# Patient Record
Sex: MALE | Race: BLACK OR AFRICAN AMERICAN | NOT HISPANIC OR LATINO
[De-identification: names, ages, dates, MRNs, and addresses within clinical notes are randomized per-mention and may not be internally consistent; named-entity substitution may affect disease eponyms.]

---

## 2017-01-12 ENCOUNTER — RX RENEWAL (OUTPATIENT)
Age: 82
End: 2017-01-12

## 2017-01-19 ENCOUNTER — APPOINTMENT (OUTPATIENT)
Dept: CARDIOLOGY | Facility: CLINIC | Age: 82
End: 2017-01-19

## 2017-01-19 ENCOUNTER — NON-APPOINTMENT (OUTPATIENT)
Age: 82
End: 2017-01-19

## 2017-01-19 VITALS
HEART RATE: 105 BPM | SYSTOLIC BLOOD PRESSURE: 114 MMHG | RESPIRATION RATE: 18 BRPM | BODY MASS INDEX: 24.11 KG/M2 | OXYGEN SATURATION: 96 % | WEIGHT: 150 LBS | TEMPERATURE: 98.3 F | DIASTOLIC BLOOD PRESSURE: 67 MMHG | HEIGHT: 66 IN

## 2017-01-26 ENCOUNTER — NON-APPOINTMENT (OUTPATIENT)
Age: 82
End: 2017-01-26

## 2017-01-26 ENCOUNTER — APPOINTMENT (OUTPATIENT)
Dept: CARDIOLOGY | Facility: CLINIC | Age: 82
End: 2017-01-26
Payer: MEDICARE

## 2017-01-26 VITALS
HEART RATE: 78 BPM | HEIGHT: 66 IN | RESPIRATION RATE: 18 BRPM | SYSTOLIC BLOOD PRESSURE: 125 MMHG | BODY MASS INDEX: 24.11 KG/M2 | WEIGHT: 150 LBS | DIASTOLIC BLOOD PRESSURE: 72 MMHG | TEMPERATURE: 98.1 F | OXYGEN SATURATION: 96 %

## 2017-01-26 PROCEDURE — 99215 OFFICE O/P EST HI 40 MIN: CPT

## 2017-01-26 PROCEDURE — 93000 ELECTROCARDIOGRAM COMPLETE: CPT

## 2017-01-27 LAB — DIGOXIN SERPL-MCNC: 0.4 NG/ML

## 2017-03-02 ENCOUNTER — NON-APPOINTMENT (OUTPATIENT)
Age: 82
End: 2017-03-02

## 2017-03-02 ENCOUNTER — APPOINTMENT (OUTPATIENT)
Dept: CARDIOLOGY | Facility: CLINIC | Age: 82
End: 2017-03-02

## 2017-03-02 VITALS
SYSTOLIC BLOOD PRESSURE: 130 MMHG | RESPIRATION RATE: 18 BRPM | DIASTOLIC BLOOD PRESSURE: 69 MMHG | BODY MASS INDEX: 24.11 KG/M2 | OXYGEN SATURATION: 95 % | WEIGHT: 150 LBS | TEMPERATURE: 97.9 F | HEIGHT: 66 IN | HEART RATE: 59 BPM

## 2017-03-08 ENCOUNTER — FORM ENCOUNTER (OUTPATIENT)
Age: 82
End: 2017-03-08

## 2017-03-09 ENCOUNTER — OUTPATIENT (OUTPATIENT)
Dept: OUTPATIENT SERVICES | Facility: HOSPITAL | Age: 82
LOS: 1 days | End: 2017-03-09
Payer: COMMERCIAL

## 2017-03-09 ENCOUNTER — APPOINTMENT (OUTPATIENT)
Dept: CT IMAGING | Facility: IMAGING CENTER | Age: 82
End: 2017-03-09

## 2017-03-09 ENCOUNTER — APPOINTMENT (OUTPATIENT)
Dept: ULTRASOUND IMAGING | Facility: IMAGING CENTER | Age: 82
End: 2017-03-09

## 2017-03-09 ENCOUNTER — APPOINTMENT (OUTPATIENT)
Dept: CARDIOLOGY | Facility: CLINIC | Age: 82
End: 2017-03-09

## 2017-03-09 VITALS
WEIGHT: 146 LBS | OXYGEN SATURATION: 96 % | TEMPERATURE: 98.1 F | HEART RATE: 60 BPM | RESPIRATION RATE: 18 BRPM | DIASTOLIC BLOOD PRESSURE: 70 MMHG | BODY MASS INDEX: 23.46 KG/M2 | HEIGHT: 66 IN | SYSTOLIC BLOOD PRESSURE: 111 MMHG

## 2017-03-09 DIAGNOSIS — R60.0 LOCALIZED EDEMA: ICD-10-CM

## 2017-03-09 DIAGNOSIS — R93.8 ABNORMAL FINDINGS ON DIAGNOSTIC IMAGING OF OTHER SPECIFIED BODY STRUCTURES: ICD-10-CM

## 2017-03-09 DIAGNOSIS — Z98.89 OTHER SPECIFIED POSTPROCEDURAL STATES: Chronic | ICD-10-CM

## 2017-03-09 DIAGNOSIS — Z95.1 PRESENCE OF AORTOCORONARY BYPASS GRAFT: Chronic | ICD-10-CM

## 2017-03-09 PROCEDURE — 93970 EXTREMITY STUDY: CPT

## 2017-03-09 PROCEDURE — 71250 CT THORAX DX C-: CPT

## 2017-03-10 LAB
ANION GAP SERPL CALC-SCNC: 15 MMOL/L
BUN SERPL-MCNC: 16 MG/DL
CALCIUM SERPL-MCNC: 9.9 MG/DL
CHLORIDE SERPL-SCNC: 99 MMOL/L
CO2 SERPL-SCNC: 27 MMOL/L
CREAT SERPL-MCNC: 1.13 MG/DL
GLUCOSE SERPL-MCNC: 112 MG/DL
POTASSIUM SERPL-SCNC: 5.3 MMOL/L
SODIUM SERPL-SCNC: 141 MMOL/L

## 2017-03-15 DIAGNOSIS — J98.11 ATELECTASIS: ICD-10-CM

## 2017-03-15 DIAGNOSIS — M79.89 OTHER SPECIFIED SOFT TISSUE DISORDERS: ICD-10-CM

## 2017-03-16 ENCOUNTER — APPOINTMENT (OUTPATIENT)
Dept: ELECTROPHYSIOLOGY | Facility: CLINIC | Age: 82
End: 2017-03-16

## 2017-03-16 ENCOUNTER — APPOINTMENT (OUTPATIENT)
Dept: CARDIOLOGY | Facility: CLINIC | Age: 82
End: 2017-03-16

## 2017-03-16 VITALS
BODY MASS INDEX: 23.78 KG/M2 | TEMPERATURE: 98.3 F | SYSTOLIC BLOOD PRESSURE: 145 MMHG | WEIGHT: 148 LBS | HEIGHT: 66 IN | HEART RATE: 63 BPM | OXYGEN SATURATION: 96 % | RESPIRATION RATE: 18 BRPM | DIASTOLIC BLOOD PRESSURE: 77 MMHG

## 2017-03-16 VITALS — DIASTOLIC BLOOD PRESSURE: 70 MMHG | SYSTOLIC BLOOD PRESSURE: 138 MMHG

## 2017-03-16 LAB
BASOPHILS # BLD AUTO: 0.02 K/UL
BASOPHILS NFR BLD AUTO: 0.3 %
EOSINOPHIL # BLD AUTO: 0.29 K/UL
EOSINOPHIL NFR BLD AUTO: 4.6 %
HCT VFR BLD CALC: 42.5 %
HGB BLD-MCNC: 13.8 G/DL
IMM GRANULOCYTES NFR BLD AUTO: 0 %
LYMPHOCYTES # BLD AUTO: 1.08 K/UL
LYMPHOCYTES NFR BLD AUTO: 17.3 %
MAN DIFF?: NORMAL
MCHC RBC-ENTMCNC: 31.3 PG
MCHC RBC-ENTMCNC: 32.5 GM/DL
MCV RBC AUTO: 96.4 FL
MONOCYTES # BLD AUTO: 0.83 K/UL
MONOCYTES NFR BLD AUTO: 13.3 %
NEUTROPHILS # BLD AUTO: 4.02 K/UL
NEUTROPHILS NFR BLD AUTO: 64.5 %
PLATELET # BLD AUTO: 144 K/UL
RBC # BLD: 4.41 M/UL
RBC # FLD: 13.2 %
WBC # FLD AUTO: 6.24 K/UL

## 2017-03-20 ENCOUNTER — RX RENEWAL (OUTPATIENT)
Age: 82
End: 2017-03-20

## 2017-03-22 ENCOUNTER — APPOINTMENT (OUTPATIENT)
Dept: CV DIAGNOSITCS | Facility: HOSPITAL | Age: 82
End: 2017-03-22

## 2017-03-22 ENCOUNTER — OUTPATIENT (OUTPATIENT)
Dept: OUTPATIENT SERVICES | Facility: HOSPITAL | Age: 82
LOS: 1 days | End: 2017-03-22

## 2017-03-22 DIAGNOSIS — I71.4 ABDOMINAL AORTIC ANEURYSM, WITHOUT RUPTURE: ICD-10-CM

## 2017-03-22 DIAGNOSIS — Z95.1 PRESENCE OF AORTOCORONARY BYPASS GRAFT: Chronic | ICD-10-CM

## 2017-03-22 DIAGNOSIS — Z98.89 OTHER SPECIFIED POSTPROCEDURAL STATES: Chronic | ICD-10-CM

## 2017-04-27 ENCOUNTER — NON-APPOINTMENT (OUTPATIENT)
Age: 82
End: 2017-04-27

## 2017-04-27 ENCOUNTER — APPOINTMENT (OUTPATIENT)
Dept: CARDIOLOGY | Facility: CLINIC | Age: 82
End: 2017-04-27

## 2017-04-27 VITALS
HEIGHT: 66 IN | WEIGHT: 150 LBS | DIASTOLIC BLOOD PRESSURE: 57 MMHG | TEMPERATURE: 97.9 F | HEART RATE: 75 BPM | SYSTOLIC BLOOD PRESSURE: 109 MMHG | BODY MASS INDEX: 24.11 KG/M2 | OXYGEN SATURATION: 95 % | RESPIRATION RATE: 18 BRPM

## 2017-04-27 RX ORDER — WARFARIN 1 MG/1
1 TABLET ORAL
Qty: 30 | Refills: 0 | Status: DISCONTINUED | COMMUNITY
Start: 2016-12-01

## 2017-04-27 RX ORDER — DIGOXIN 125 UG/1
125 TABLET ORAL
Qty: 15 | Refills: 2 | Status: DISCONTINUED | COMMUNITY
Start: 2017-01-19 | End: 2017-04-27

## 2017-04-27 RX ORDER — WARFARIN 3 MG/1
3 TABLET ORAL
Qty: 30 | Refills: 0 | Status: DISCONTINUED | COMMUNITY
Start: 2016-12-01

## 2017-04-27 RX ORDER — ACETAMINOPHEN AND CODEINE 300; 30 MG/1; MG/1
300-30 TABLET ORAL
Qty: 20 | Refills: 0 | Status: DISCONTINUED | COMMUNITY
Start: 2016-10-19

## 2017-04-27 RX ORDER — AMOXICILLIN 875 MG/1
875 TABLET, FILM COATED ORAL
Qty: 14 | Refills: 0 | Status: DISCONTINUED | COMMUNITY
Start: 2016-10-01

## 2017-06-12 ENCOUNTER — RX RENEWAL (OUTPATIENT)
Age: 82
End: 2017-06-12

## 2017-06-21 ENCOUNTER — APPOINTMENT (OUTPATIENT)
Dept: ELECTROPHYSIOLOGY | Facility: CLINIC | Age: 82
End: 2017-06-21

## 2017-06-21 VITALS — DIASTOLIC BLOOD PRESSURE: 64 MMHG | SYSTOLIC BLOOD PRESSURE: 128 MMHG

## 2017-07-21 ENCOUNTER — APPOINTMENT (OUTPATIENT)
Dept: ELECTROPHYSIOLOGY | Facility: CLINIC | Age: 82
End: 2017-07-21

## 2017-07-21 VITALS
BODY MASS INDEX: 22.98 KG/M2 | SYSTOLIC BLOOD PRESSURE: 176 MMHG | HEART RATE: 59 BPM | HEIGHT: 66 IN | DIASTOLIC BLOOD PRESSURE: 95 MMHG | OXYGEN SATURATION: 99 % | WEIGHT: 143 LBS

## 2017-07-24 ENCOUNTER — RX RENEWAL (OUTPATIENT)
Age: 82
End: 2017-07-24

## 2017-07-27 ENCOUNTER — NON-APPOINTMENT (OUTPATIENT)
Age: 82
End: 2017-07-27

## 2017-07-27 ENCOUNTER — APPOINTMENT (OUTPATIENT)
Dept: CARDIOLOGY | Facility: CLINIC | Age: 82
End: 2017-07-27
Payer: MEDICARE

## 2017-07-27 VITALS
BODY MASS INDEX: 23.95 KG/M2 | RESPIRATION RATE: 18 BRPM | HEART RATE: 62 BPM | OXYGEN SATURATION: 97 % | WEIGHT: 149 LBS | DIASTOLIC BLOOD PRESSURE: 76 MMHG | HEIGHT: 66 IN | SYSTOLIC BLOOD PRESSURE: 126 MMHG | TEMPERATURE: 98.3 F

## 2017-07-27 PROCEDURE — 99215 OFFICE O/P EST HI 40 MIN: CPT

## 2017-07-27 PROCEDURE — 93000 ELECTROCARDIOGRAM COMPLETE: CPT

## 2017-08-31 ENCOUNTER — OUTPATIENT (OUTPATIENT)
Dept: OUTPATIENT SERVICES | Facility: HOSPITAL | Age: 82
LOS: 1 days | End: 2017-08-31
Payer: MEDICARE

## 2017-08-31 ENCOUNTER — APPOINTMENT (OUTPATIENT)
Dept: CV DIAGNOSITCS | Facility: HOSPITAL | Age: 82
End: 2017-08-31

## 2017-08-31 DIAGNOSIS — R60.0 LOCALIZED EDEMA: ICD-10-CM

## 2017-08-31 DIAGNOSIS — Z95.1 PRESENCE OF AORTOCORONARY BYPASS GRAFT: Chronic | ICD-10-CM

## 2017-08-31 DIAGNOSIS — Z98.89 OTHER SPECIFIED POSTPROCEDURAL STATES: Chronic | ICD-10-CM

## 2017-08-31 PROCEDURE — 93970 EXTREMITY STUDY: CPT | Mod: 26

## 2017-09-01 ENCOUNTER — APPOINTMENT (OUTPATIENT)
Dept: CARDIOLOGY | Facility: CLINIC | Age: 82
End: 2017-09-01

## 2017-09-28 ENCOUNTER — APPOINTMENT (OUTPATIENT)
Dept: ELECTROPHYSIOLOGY | Facility: CLINIC | Age: 82
End: 2017-09-28
Payer: MEDICARE

## 2017-09-28 PROCEDURE — 93296 REM INTERROG EVL PM/IDS: CPT

## 2017-09-28 PROCEDURE — 93294 REM INTERROG EVL PM/LDLS PM: CPT

## 2017-10-26 ENCOUNTER — APPOINTMENT (OUTPATIENT)
Dept: CARDIOLOGY | Facility: CLINIC | Age: 82
End: 2017-10-26
Payer: MEDICARE

## 2017-10-26 ENCOUNTER — NON-APPOINTMENT (OUTPATIENT)
Age: 82
End: 2017-10-26

## 2017-10-26 VITALS
DIASTOLIC BLOOD PRESSURE: 75 MMHG | HEART RATE: 97 BPM | OXYGEN SATURATION: 97 % | SYSTOLIC BLOOD PRESSURE: 125 MMHG | WEIGHT: 147 LBS | TEMPERATURE: 97.9 F | HEIGHT: 66 IN | RESPIRATION RATE: 18 BRPM | BODY MASS INDEX: 23.63 KG/M2

## 2017-10-26 PROCEDURE — 99215 OFFICE O/P EST HI 40 MIN: CPT

## 2017-10-26 PROCEDURE — 93000 ELECTROCARDIOGRAM COMPLETE: CPT

## 2018-01-03 ENCOUNTER — APPOINTMENT (OUTPATIENT)
Dept: ELECTROPHYSIOLOGY | Facility: CLINIC | Age: 83
End: 2018-01-03
Payer: MEDICARE

## 2018-01-03 VITALS — HEART RATE: 90 BPM | SYSTOLIC BLOOD PRESSURE: 149 MMHG | DIASTOLIC BLOOD PRESSURE: 90 MMHG

## 2018-01-03 PROCEDURE — 93280 PM DEVICE PROGR EVAL DUAL: CPT

## 2018-01-23 ENCOUNTER — MEDICATION RENEWAL (OUTPATIENT)
Age: 83
End: 2018-01-23

## 2018-01-25 ENCOUNTER — APPOINTMENT (OUTPATIENT)
Dept: UROLOGY | Facility: CLINIC | Age: 83
End: 2018-01-25
Payer: MEDICARE

## 2018-01-25 ENCOUNTER — APPOINTMENT (OUTPATIENT)
Dept: CARDIOLOGY | Facility: CLINIC | Age: 83
End: 2018-01-25
Payer: MEDICARE

## 2018-01-25 ENCOUNTER — NON-APPOINTMENT (OUTPATIENT)
Age: 83
End: 2018-01-25

## 2018-01-25 VITALS
OXYGEN SATURATION: 96 % | HEIGHT: 66 IN | WEIGHT: 143 LBS | SYSTOLIC BLOOD PRESSURE: 107 MMHG | RESPIRATION RATE: 17 BRPM | BODY MASS INDEX: 22.98 KG/M2 | HEART RATE: 77 BPM | TEMPERATURE: 98.1 F | DIASTOLIC BLOOD PRESSURE: 66 MMHG

## 2018-01-25 DIAGNOSIS — N40.0 BENIGN PROSTATIC HYPERPLASIA WITHOUT LOWER URINARY TRACT SYMPMS: ICD-10-CM

## 2018-01-25 PROCEDURE — 99215 OFFICE O/P EST HI 40 MIN: CPT

## 2018-01-25 PROCEDURE — 93000 ELECTROCARDIOGRAM COMPLETE: CPT

## 2018-01-25 PROCEDURE — 99203 OFFICE O/P NEW LOW 30 MIN: CPT

## 2018-02-08 ENCOUNTER — APPOINTMENT (OUTPATIENT)
Dept: CV DIAGNOSITCS | Facility: HOSPITAL | Age: 83
End: 2018-02-08
Payer: MEDICARE

## 2018-02-08 ENCOUNTER — OUTPATIENT (OUTPATIENT)
Dept: OUTPATIENT SERVICES | Facility: HOSPITAL | Age: 83
LOS: 1 days | End: 2018-02-08

## 2018-02-08 DIAGNOSIS — Z95.1 PRESENCE OF AORTOCORONARY BYPASS GRAFT: Chronic | ICD-10-CM

## 2018-02-08 DIAGNOSIS — I25.10 ATHEROSCLEROTIC HEART DISEASE OF NATIVE CORONARY ARTERY WITHOUT ANGINA PECTORIS: ICD-10-CM

## 2018-02-08 DIAGNOSIS — I10 ESSENTIAL (PRIMARY) HYPERTENSION: ICD-10-CM

## 2018-02-08 DIAGNOSIS — Z98.89 OTHER SPECIFIED POSTPROCEDURAL STATES: Chronic | ICD-10-CM

## 2018-02-08 PROCEDURE — 93306 TTE W/DOPPLER COMPLETE: CPT | Mod: 26

## 2018-02-23 ENCOUNTER — OUTPATIENT (OUTPATIENT)
Dept: OUTPATIENT SERVICES | Facility: HOSPITAL | Age: 83
LOS: 1 days | End: 2018-02-23
Payer: MEDICARE

## 2018-02-23 VITALS
HEIGHT: 65 IN | WEIGHT: 149.91 LBS | OXYGEN SATURATION: 95 % | DIASTOLIC BLOOD PRESSURE: 70 MMHG | TEMPERATURE: 98 F | SYSTOLIC BLOOD PRESSURE: 138 MMHG | RESPIRATION RATE: 16 BRPM | HEART RATE: 79 BPM

## 2018-02-23 DIAGNOSIS — Z95.1 PRESENCE OF AORTOCORONARY BYPASS GRAFT: Chronic | ICD-10-CM

## 2018-02-23 DIAGNOSIS — N47.1 PHIMOSIS: ICD-10-CM

## 2018-02-23 DIAGNOSIS — Z98.89 OTHER SPECIFIED POSTPROCEDURAL STATES: Chronic | ICD-10-CM

## 2018-02-23 DIAGNOSIS — Z95.0 PRESENCE OF CARDIAC PACEMAKER: Chronic | ICD-10-CM

## 2018-02-23 DIAGNOSIS — I10 ESSENTIAL (PRIMARY) HYPERTENSION: ICD-10-CM

## 2018-02-23 DIAGNOSIS — I48.91 UNSPECIFIED ATRIAL FIBRILLATION: ICD-10-CM

## 2018-02-23 LAB
APTT BLD: 50.4 SEC — HIGH (ref 27.5–37.4)
BUN SERPL-MCNC: 22 MG/DL — SIGNIFICANT CHANGE UP (ref 7–23)
CALCIUM SERPL-MCNC: 9 MG/DL — SIGNIFICANT CHANGE UP (ref 8.4–10.5)
CHLORIDE SERPL-SCNC: 102 MMOL/L — SIGNIFICANT CHANGE UP (ref 98–107)
CO2 SERPL-SCNC: 29 MMOL/L — SIGNIFICANT CHANGE UP (ref 22–31)
CREAT SERPL-MCNC: 1.1 MG/DL — SIGNIFICANT CHANGE UP (ref 0.5–1.3)
GLUCOSE SERPL-MCNC: 109 MG/DL — HIGH (ref 70–99)
HBA1C BLD-MCNC: 6.3 % — HIGH (ref 4–5.6)
HCT VFR BLD CALC: 38.2 % — LOW (ref 39–50)
HGB BLD-MCNC: 12.3 G/DL — LOW (ref 13–17)
INR BLD: 2.41 — HIGH (ref 0.88–1.17)
MCHC RBC-ENTMCNC: 30.8 PG — SIGNIFICANT CHANGE UP (ref 27–34)
MCHC RBC-ENTMCNC: 32.2 % — SIGNIFICANT CHANGE UP (ref 32–36)
MCV RBC AUTO: 95.7 FL — SIGNIFICANT CHANGE UP (ref 80–100)
NRBC # FLD: 0 — SIGNIFICANT CHANGE UP
PLATELET # BLD AUTO: 117 K/UL — LOW (ref 150–400)
PMV BLD: 12.1 FL — SIGNIFICANT CHANGE UP (ref 7–13)
POTASSIUM SERPL-MCNC: 4.4 MMOL/L — SIGNIFICANT CHANGE UP (ref 3.5–5.3)
POTASSIUM SERPL-SCNC: 4.4 MMOL/L — SIGNIFICANT CHANGE UP (ref 3.5–5.3)
PROTHROM AB SERPL-ACNC: 27.2 SEC — HIGH (ref 9.8–13.1)
RBC # BLD: 3.99 M/UL — LOW (ref 4.2–5.8)
RBC # FLD: 13.5 % — SIGNIFICANT CHANGE UP (ref 10.3–14.5)
SODIUM SERPL-SCNC: 140 MMOL/L — SIGNIFICANT CHANGE UP (ref 135–145)
WBC # BLD: 6.57 K/UL — SIGNIFICANT CHANGE UP (ref 3.8–10.5)
WBC # FLD AUTO: 6.57 K/UL — SIGNIFICANT CHANGE UP (ref 3.8–10.5)

## 2018-02-23 PROCEDURE — 93010 ELECTROCARDIOGRAM REPORT: CPT

## 2018-02-23 NOTE — H&P PST ADULT - PROBLEM SELECTOR PLAN 1
Pt is scheduled for circumcision on 3/6/18.     Pre op instructions given and pt able to verbalize understanding.

## 2018-02-23 NOTE — H&P PST ADULT - PROBLEM SELECTOR PLAN 3
Pt instructed to take all morning antihypertensive/antiarrhythmic medications as prescribed(pt does not know which medications he takes in the morning, usually is given by daughter).  Pt met STOP BANG score for SELWYN precaution. OR booking notified via fax.

## 2018-02-23 NOTE — H&P PST ADULT - NEGATIVE MUSCULOSKELETAL SYMPTOMS
no muscle cramps/no muscle weakness/no neck pain/no leg pain R/no back pain/no leg pain L/no arthralgia/no stiffness/no joint swelling/no myalgia/no arm pain L/no arm pain R

## 2018-02-23 NOTE — H&P PST ADULT - NEGATIVE NEUROLOGICAL SYMPTOMS
no loss of sensation/no hemiparesis/no syncope/no vertigo/no loss of consciousness/no headache/no confusion/no transient paralysis/no weakness/no generalized seizures/no difficulty walking

## 2018-02-23 NOTE — H&P PST ADULT - PROBLEM SELECTOR PLAN 2
Pt instructed to continue coumadin unless told otherwise.  Urology note in chart, staying pt may stay on coumadin.

## 2018-02-23 NOTE — H&P PST ADULT - PSH
Artificial pacemaker  [employer] Model #[id number] Serial #[id number]  Breast cyst  bilateral around 50 years ago  S/P CABG (coronary artery bypass graft)  2013  S/P heart valve repair  mitral valve repair with annuloplasty ring 2013  S/P small bowel resection  due to perforated intestine  during colonoscopy around 2000  Stented coronary artery  1996 x1 Artificial pacemaker  [employer] Model #[id number] Serial #[id number]  Breast cyst  bilateral around 50 years ago  S/P CABG (coronary artery bypass graft)  x2 2013  S/P heart valve repair  mitral valve repair with annuloplasty ring 2013  S/P small bowel resection  due to perforated intestine  during colonoscopy around 2000  Stented coronary artery  1996 x1

## 2018-02-23 NOTE — H&P PST ADULT - LYMPHATIC
posterior cervical L/posterior cervical R/supraclavicular R/anterior cervical R/supraclavicular L/anterior cervical L

## 2018-02-23 NOTE — H&P PST ADULT - RS GEN PE MLT RESP DETAILS PC
no rales/breath sounds equal/no wheezes/clear to auscultation bilaterally/airway patent/no intercostal retractions/no chest wall tenderness/good air movement/respirations non-labored/no rhonchi

## 2018-02-23 NOTE — H&P PST ADULT - NSANTHOSAYNRD_GEN_A_CORE
No. SELWYN screening performed.  STOP BANG Legend: 0-2 = LOW Risk; 3-4 = INTERMEDIATE Risk; 5-8 = HIGH Risk

## 2018-02-23 NOTE — H&P PST ADULT - HISTORY OF PRESENT ILLNESS
89 yo male with PM atrial fibrillation on coumadin, CAD x1 stent 1996, hypertension, hyperlipidemia, and diet controlled DM2 presents to pre surgical testing.  Pt report he started to experiencing phimosis for a few years but is progressively worsening.  Pt is scheduled for circumcision on 3/6/18.

## 2018-02-23 NOTE — H&P PST ADULT - PMH
Atrial fibrillation    BPH (benign prostatic hyperplasia)    BPH (Benign Prostatic Hyperplasia)    CAD (coronary artery disease)  x1 stent 1996  S/P CABGx2 2005  CAD (coronary artery disease)    Cataract    GERD (gastroesophageal reflux disease)    Glaucoma    HLD (hyperlipidemia)    HTN (hypertension)    HTN (hypertension)    Seizure  age 9, had 1 seizure, s/p fall  Valvular heart disease  S/P valve repair Atrial fibrillation    BPH (benign prostatic hyperplasia)    BPH (Benign Prostatic Hyperplasia)    CAD (coronary artery disease)  x1 stent 1996  S/P CABGx2 2005  CAD (coronary artery disease)    Cataract    GERD (gastroesophageal reflux disease)    Glaucoma    HLD (hyperlipidemia)    HTN (hypertension)    HTN (hypertension)    Phimosis    Seizure  age 9, had 1 seizure, s/p fall  Valvular heart disease  S/P valve repair

## 2018-02-23 NOTE — H&P PST ADULT - NEGATIVE OPHTHALMOLOGIC SYMPTOMS
no blurred vision L/no pain R/no diplopia/no photophobia/no lacrimation L/no lacrimation R/no pain L/no blurred vision R/no loss of vision L/no loss of vision R

## 2018-02-23 NOTE — H&P PST ADULT - NEGATIVE GASTROINTESTINAL SYMPTOMS
no change in bowel habits/no hematochezia/no constipation/no melena/no abdominal pain/no vomiting/no diarrhea/no nausea

## 2018-02-23 NOTE — H&P PST ADULT - NEGATIVE ENMT SYMPTOMS
no nasal congestion/no dry mouth/no dysphagia/no tinnitus/no sinus symptoms/no nasal discharge/no throat pain/no ear pain/no vertigo

## 2018-02-23 NOTE — H&P PST ADULT - NEGATIVE CARDIOVASCULAR SYMPTOMS
no peripheral edema/no orthopnea/no chest pain/no paroxysmal nocturnal dyspnea/no palpitations/no claudication/no dyspnea on exertion

## 2018-02-23 NOTE — H&P PST ADULT - CARDIOVASCULAR DETAILS
irregular rate and rhythm/positive S2/positive S1 murmur/irregular rate and rhythm/positive S2/positive S1

## 2018-02-23 NOTE — H&P PST ADULT - MUSCULOSKELETAL
detailed exam no calf tenderness/no joint swelling/no joint warmth/ROM intact/no joint erythema/normal strength details…

## 2018-03-06 ENCOUNTER — APPOINTMENT (OUTPATIENT)
Dept: UROLOGY | Facility: AMBULATORY SURGERY CENTER | Age: 83
End: 2018-03-06

## 2018-03-06 ENCOUNTER — RESULT REVIEW (OUTPATIENT)
Age: 83
End: 2018-03-06

## 2018-03-06 ENCOUNTER — TRANSCRIPTION ENCOUNTER (OUTPATIENT)
Age: 83
End: 2018-03-06

## 2018-03-06 ENCOUNTER — OUTPATIENT (OUTPATIENT)
Dept: OUTPATIENT SERVICES | Facility: HOSPITAL | Age: 83
LOS: 1 days | Discharge: HOME | End: 2018-03-06
Payer: MEDICARE

## 2018-03-06 VITALS
SYSTOLIC BLOOD PRESSURE: 166 MMHG | OXYGEN SATURATION: 100 % | HEIGHT: 65 IN | DIASTOLIC BLOOD PRESSURE: 77 MMHG | TEMPERATURE: 98 F | WEIGHT: 149.91 LBS | HEART RATE: 60 BPM | RESPIRATION RATE: 18 BRPM

## 2018-03-06 VITALS — HEART RATE: 59 BPM | OXYGEN SATURATION: 98 % | SYSTOLIC BLOOD PRESSURE: 151 MMHG | DIASTOLIC BLOOD PRESSURE: 90 MMHG

## 2018-03-06 DIAGNOSIS — Z95.1 PRESENCE OF AORTOCORONARY BYPASS GRAFT: Chronic | ICD-10-CM

## 2018-03-06 DIAGNOSIS — Z95.0 PRESENCE OF CARDIAC PACEMAKER: Chronic | ICD-10-CM

## 2018-03-06 DIAGNOSIS — N47.1 PHIMOSIS: ICD-10-CM

## 2018-03-06 DIAGNOSIS — Z98.89 OTHER SPECIFIED POSTPROCEDURAL STATES: Chronic | ICD-10-CM

## 2018-03-06 LAB
APTT BLD: 37.6 SEC — HIGH (ref 27.5–37.4)
GLUCOSE BLDC GLUCOMTR-MCNC: 106 MG/DL — HIGH (ref 70–99)
INR BLD: 1.15 — SIGNIFICANT CHANGE UP (ref 0.88–1.17)
PROTHROM AB SERPL-ACNC: 12.8 SEC — SIGNIFICANT CHANGE UP (ref 9.8–13.1)

## 2018-03-06 PROCEDURE — 88304 TISSUE EXAM BY PATHOLOGIST: CPT | Mod: 26

## 2018-03-06 PROCEDURE — 54161 CIRCUM 28 DAYS OR OLDER: CPT

## 2018-03-06 RX ORDER — OXYCODONE AND ACETAMINOPHEN 5; 325 MG/1; MG/1
5-325 TABLET ORAL
Qty: 8 | Refills: 0 | Status: COMPLETED | COMMUNITY
Start: 2018-03-06 | End: 2018-03-06

## 2018-03-06 NOTE — ASU DISCHARGE PLAN (ADULT/PEDIATRIC). - NOTIFY
Pain not relieved by Medications/Numbness, color, or temperature change to extremity/Unable to Urinate/Bleeding that does not stop/Swelling that continues/Inability to Tolerate Liquids or Foods/Persistent Nausea and Vomiting/Fever greater than 101

## 2018-03-06 NOTE — ASU DISCHARGE PLAN (ADULT/PEDIATRIC). - SPECIAL INSTRUCTIONS
Leave dressing in place     Will remove in office     Don't take Tylenol with percocet as there is Tylenol in percocet     Take stool softener as needed for possible constipation from pain meds

## 2018-03-08 ENCOUNTER — APPOINTMENT (OUTPATIENT)
Dept: UROLOGY | Facility: CLINIC | Age: 83
End: 2018-03-08
Payer: MEDICARE

## 2018-03-08 VITALS
DIASTOLIC BLOOD PRESSURE: 60 MMHG | BODY MASS INDEX: 23.82 KG/M2 | TEMPERATURE: 97.3 F | SYSTOLIC BLOOD PRESSURE: 140 MMHG | HEIGHT: 65 IN | WEIGHT: 143 LBS | RESPIRATION RATE: 18 BRPM

## 2018-03-08 PROCEDURE — 99024 POSTOP FOLLOW-UP VISIT: CPT

## 2018-03-08 RX ORDER — AMOXICILLIN 875 MG/1
875 TABLET, FILM COATED ORAL
Qty: 6 | Refills: 0 | Status: DISCONTINUED | COMMUNITY
Start: 2018-03-06 | End: 2018-03-08

## 2018-03-14 LAB — SURGICAL PATHOLOGY STUDY: SIGNIFICANT CHANGE UP

## 2018-03-15 ENCOUNTER — APPOINTMENT (OUTPATIENT)
Dept: UROLOGY | Facility: CLINIC | Age: 83
End: 2018-03-15
Payer: MEDICARE

## 2018-03-15 PROCEDURE — 99024 POSTOP FOLLOW-UP VISIT: CPT

## 2018-03-21 ENCOUNTER — APPOINTMENT (OUTPATIENT)
Dept: UROLOGY | Facility: CLINIC | Age: 83
End: 2018-03-21

## 2018-03-22 ENCOUNTER — APPOINTMENT (OUTPATIENT)
Dept: UROLOGY | Facility: CLINIC | Age: 83
End: 2018-03-22
Payer: MEDICARE

## 2018-03-22 VITALS
DIASTOLIC BLOOD PRESSURE: 70 MMHG | HEIGHT: 65 IN | HEART RATE: 88 BPM | TEMPERATURE: 97.3 F | SYSTOLIC BLOOD PRESSURE: 130 MMHG | BODY MASS INDEX: 23.82 KG/M2 | RESPIRATION RATE: 16 BRPM | WEIGHT: 143 LBS | OXYGEN SATURATION: 97 %

## 2018-03-22 PROCEDURE — 99212 OFFICE O/P EST SF 10 MIN: CPT

## 2018-03-22 RX ORDER — CEPHALEXIN 500 MG/1
500 TABLET ORAL
Qty: 10 | Refills: 0 | Status: DISCONTINUED | COMMUNITY
Start: 2018-03-15 | End: 2018-03-22

## 2018-04-12 ENCOUNTER — APPOINTMENT (OUTPATIENT)
Dept: ELECTROPHYSIOLOGY | Facility: CLINIC | Age: 83
End: 2018-04-12
Payer: MEDICARE

## 2018-04-12 PROCEDURE — 93294 REM INTERROG EVL PM/LDLS PM: CPT

## 2018-04-12 PROCEDURE — 93296 REM INTERROG EVL PM/IDS: CPT

## 2018-04-26 ENCOUNTER — APPOINTMENT (OUTPATIENT)
Dept: CARDIOLOGY | Facility: CLINIC | Age: 83
End: 2018-04-26
Payer: MEDICARE

## 2018-04-26 ENCOUNTER — NON-APPOINTMENT (OUTPATIENT)
Age: 83
End: 2018-04-26

## 2018-04-26 VITALS
HEART RATE: 67 BPM | DIASTOLIC BLOOD PRESSURE: 73 MMHG | SYSTOLIC BLOOD PRESSURE: 118 MMHG | TEMPERATURE: 98.1 F | HEIGHT: 65 IN | RESPIRATION RATE: 17 BRPM | OXYGEN SATURATION: 96 % | WEIGHT: 150 LBS | BODY MASS INDEX: 24.99 KG/M2

## 2018-04-26 PROCEDURE — 93000 ELECTROCARDIOGRAM COMPLETE: CPT

## 2018-04-26 PROCEDURE — 99215 OFFICE O/P EST HI 40 MIN: CPT

## 2018-05-10 ENCOUNTER — RX RENEWAL (OUTPATIENT)
Age: 83
End: 2018-05-10

## 2018-06-04 ENCOUNTER — MEDICATION RENEWAL (OUTPATIENT)
Age: 83
End: 2018-06-04

## 2018-07-11 ENCOUNTER — MEDICATION RENEWAL (OUTPATIENT)
Age: 83
End: 2018-07-11

## 2018-07-18 ENCOUNTER — APPOINTMENT (OUTPATIENT)
Dept: ELECTROPHYSIOLOGY | Facility: CLINIC | Age: 83
End: 2018-07-18
Payer: MEDICARE

## 2018-07-18 PROBLEM — R56.9 UNSPECIFIED CONVULSIONS: Chronic | Status: ACTIVE | Noted: 2018-02-23

## 2018-07-18 PROBLEM — N47.1 PHIMOSIS: Chronic | Status: ACTIVE | Noted: 2018-02-23

## 2018-07-18 PROCEDURE — 93280 PM DEVICE PROGR EVAL DUAL: CPT

## 2018-07-26 ENCOUNTER — NON-APPOINTMENT (OUTPATIENT)
Age: 83
End: 2018-07-26

## 2018-07-26 ENCOUNTER — APPOINTMENT (OUTPATIENT)
Dept: CARDIOLOGY | Facility: CLINIC | Age: 83
End: 2018-07-26
Payer: MEDICARE

## 2018-07-26 VITALS
WEIGHT: 147 LBS | RESPIRATION RATE: 17 BRPM | OXYGEN SATURATION: 96 % | BODY MASS INDEX: 24.49 KG/M2 | TEMPERATURE: 98.3 F | SYSTOLIC BLOOD PRESSURE: 128 MMHG | HEART RATE: 65 BPM | HEIGHT: 65 IN | DIASTOLIC BLOOD PRESSURE: 74 MMHG

## 2018-07-26 PROCEDURE — 99215 OFFICE O/P EST HI 40 MIN: CPT

## 2018-07-26 PROCEDURE — 93000 ELECTROCARDIOGRAM COMPLETE: CPT

## 2018-07-30 ENCOUNTER — RX RENEWAL (OUTPATIENT)
Age: 83
End: 2018-07-30

## 2018-08-13 ENCOUNTER — RX RENEWAL (OUTPATIENT)
Age: 83
End: 2018-08-13

## 2018-09-07 ENCOUNTER — EMERGENCY (EMERGENCY)
Facility: HOSPITAL | Age: 83
LOS: 1 days | Discharge: HOME | End: 2018-09-07
Attending: EMERGENCY MEDICINE | Admitting: EMERGENCY MEDICINE
Payer: MEDICARE

## 2018-09-07 VITALS
RESPIRATION RATE: 16 BRPM | HEART RATE: 64 BPM | OXYGEN SATURATION: 100 % | SYSTOLIC BLOOD PRESSURE: 154 MMHG | DIASTOLIC BLOOD PRESSURE: 60 MMHG | TEMPERATURE: 97 F

## 2018-09-07 VITALS
OXYGEN SATURATION: 99 % | RESPIRATION RATE: 18 BRPM | HEART RATE: 85 BPM | TEMPERATURE: 98 F | SYSTOLIC BLOOD PRESSURE: 147 MMHG | DIASTOLIC BLOOD PRESSURE: 80 MMHG

## 2018-09-07 DIAGNOSIS — Z95.0 PRESENCE OF CARDIAC PACEMAKER: Chronic | ICD-10-CM

## 2018-09-07 DIAGNOSIS — Z98.89 OTHER SPECIFIED POSTPROCEDURAL STATES: Chronic | ICD-10-CM

## 2018-09-07 DIAGNOSIS — Z95.1 PRESENCE OF AORTOCORONARY BYPASS GRAFT: Chronic | ICD-10-CM

## 2018-09-07 LAB
APTT BLD: 50.3 SEC — HIGH (ref 27.5–37.4)
BASE EXCESS BLDV CALC-SCNC: 3.7 MMOL/L — SIGNIFICANT CHANGE UP
BUN SERPL-MCNC: 20 MG/DL — SIGNIFICANT CHANGE UP (ref 7–23)
CALCIUM SERPL-MCNC: 9.5 MG/DL — SIGNIFICANT CHANGE UP (ref 8.4–10.5)
CHLORIDE SERPL-SCNC: 101 MMOL/L — SIGNIFICANT CHANGE UP (ref 98–107)
CO2 SERPL-SCNC: 27 MMOL/L — SIGNIFICANT CHANGE UP (ref 22–31)
CREAT SERPL-MCNC: 1.1 MG/DL — SIGNIFICANT CHANGE UP (ref 0.5–1.3)
DIGOXIN SERPL-MCNC: < 0.3 NG/ML — LOW (ref 0.8–2)
GAS PNL BLDV: 134 MMOL/L — LOW (ref 136–146)
GLUCOSE BLDV-MCNC: 200 — HIGH (ref 70–99)
GLUCOSE SERPL-MCNC: 199 MG/DL — HIGH (ref 70–99)
HCO3 BLDV-SCNC: 26 MMOL/L — SIGNIFICANT CHANGE UP (ref 20–27)
HCT VFR BLD CALC: 37.1 % — LOW (ref 39–50)
HCT VFR BLDV CALC: 39 % — SIGNIFICANT CHANGE UP (ref 39–51)
HGB BLD-MCNC: 12.2 G/DL — LOW (ref 13–17)
HGB BLDV-MCNC: 12.7 G/DL — LOW (ref 13–17)
INR BLD: 3.05 — HIGH (ref 0.88–1.17)
MAGNESIUM SERPL-MCNC: 2 MG/DL — SIGNIFICANT CHANGE UP (ref 1.6–2.6)
MCHC RBC-ENTMCNC: 30.9 PG — SIGNIFICANT CHANGE UP (ref 27–34)
MCHC RBC-ENTMCNC: 32.9 % — SIGNIFICANT CHANGE UP (ref 32–36)
MCV RBC AUTO: 93.9 FL — SIGNIFICANT CHANGE UP (ref 80–100)
NRBC # FLD: 0 — SIGNIFICANT CHANGE UP
PCO2 BLDV: 56 MMHG — HIGH (ref 41–51)
PH BLDV: 7.33 PH — SIGNIFICANT CHANGE UP (ref 7.32–7.43)
PHOSPHATE SERPL-MCNC: 2.5 MG/DL — SIGNIFICANT CHANGE UP (ref 2.5–4.5)
PLATELET # BLD AUTO: 104 K/UL — LOW (ref 150–400)
PMV BLD: 12.3 FL — SIGNIFICANT CHANGE UP (ref 7–13)
PO2 BLDV: 28 MMHG — LOW (ref 35–40)
POTASSIUM BLDV-SCNC: 4 MMOL/L — SIGNIFICANT CHANGE UP (ref 3.4–4.5)
POTASSIUM SERPL-MCNC: 4.2 MMOL/L — SIGNIFICANT CHANGE UP (ref 3.5–5.3)
POTASSIUM SERPL-SCNC: 4.2 MMOL/L — SIGNIFICANT CHANGE UP (ref 3.5–5.3)
PROTHROM AB SERPL-ACNC: 34.6 SEC — HIGH (ref 9.8–13.1)
RBC # BLD: 3.95 M/UL — LOW (ref 4.2–5.8)
RBC # FLD: 13.8 % — SIGNIFICANT CHANGE UP (ref 10.3–14.5)
SAO2 % BLDV: 44.6 % — LOW (ref 60–85)
SODIUM SERPL-SCNC: 138 MMOL/L — SIGNIFICANT CHANGE UP (ref 135–145)
TROPONIN T, HIGH SENSITIVITY: 22 NG/L — SIGNIFICANT CHANGE UP (ref ?–14)
TROPONIN T, HIGH SENSITIVITY: 24 NG/L — SIGNIFICANT CHANGE UP (ref ?–14)
WBC # BLD: 7.12 K/UL — SIGNIFICANT CHANGE UP (ref 3.8–10.5)
WBC # FLD AUTO: 7.12 K/UL — SIGNIFICANT CHANGE UP (ref 3.8–10.5)

## 2018-09-07 PROCEDURE — 99284 EMERGENCY DEPT VISIT MOD MDM: CPT | Mod: 25,GC

## 2018-09-07 PROCEDURE — 93010 ELECTROCARDIOGRAM REPORT: CPT

## 2018-09-07 PROCEDURE — 70450 CT HEAD/BRAIN W/O DYE: CPT | Mod: 26

## 2018-09-07 PROCEDURE — 71046 X-RAY EXAM CHEST 2 VIEWS: CPT | Mod: 26

## 2018-09-07 NOTE — ED PROVIDER NOTE - OBJECTIVE STATEMENT
89M with PMH of Afib on Coumadin, CAD s/p stents (1996) and CABG (2005), HTN, HLD, tachy-pedro syndrome s/p PPM (2014), BPH and diet-controlled T2DM p/w L arm paresthesias and discomfort since 6AM. Patient states the tingling in his fingers initially started yesterday, lasted for several hours and resolved. This morning, he woke up and noticed his fingers were numb/tingling again and also had some discomfort in his arm which was new. He measured his BP this morning as per usual and it was within the normal range. He continued to have dull arm pain and decided to call EMS. Upon arrival to the ED, patient states the discomfort and paresthesias had resolved. He denies dyspnea on exertion, PND, orthopnea, palpitations, visual changes, slurred speech, leg swelling, fever, chills, nausea or vomiting. 89M with PMH of Afib on Coumadin, CAD s/p stents (1996) and CABG (2013), MV repair (2013), HTN, HLD, tachy-pedro syndrome s/p PPM (2014), BPH and diet-controlled T2DM p/w L arm paresthesias and discomfort since 6AM. Patient states the tingling in his fingers initially started yesterday, lasted for several hours and resolved. This morning, he woke up and noticed his fingers were numb/tingling again and also had some discomfort in his arm which was new. He measured his BP this morning as per usual and it was within the normal range. He continued to have dull arm pain and decided to call EMS. Upon arrival to the ED, patient states the discomfort and paresthesias had resolved. He denies dyspnea on exertion, PND, orthopnea, palpitations, visual changes, slurred speech, leg swelling, fever, chills, nausea or vomiting.

## 2018-09-07 NOTE — ED ADULT NURSE NOTE - NSIMPLEMENTINTERV_GEN_ALL_ED
Implemented All Fall with Harm Risk Interventions:  [place] to call system. Call bell, personal items and telephone within reach. Instruct patient to call for assistance. Room bathroom lighting operational. Non-slip footwear when patient is off stretcher. Physically safe environment: no spills, clutter or unnecessary equipment. Stretcher in lowest position, wheels locked, appropriate side rails in place. Provide visual cue, wrist band, yellow gown, etc. Monitor gait and stability. Monitor for mental status changes and reorient to person, place, and time. Review medications for side effects contributing to fall risk. Reinforce activity limits and safety measures with patient and family. Provide visual clues: red socks.

## 2018-09-07 NOTE — ED PROVIDER NOTE - PROGRESS NOTE DETAILS
No recurrence of arm pain or paresthesias. Troponins downtrending. CTH and CXR negative. Will discharge home with outpatient Neurology and Cardiology f/u.

## 2018-09-07 NOTE — ED PROVIDER NOTE - MEDICAL DECISION MAKING DETAILS
89M with significant cardiac history including prior CABG and Afib on Coumadin p/w left arm paresthesias and pain concerning for angina vs. TIA. ECG appears unchanged from prior, reveals LBBB and Afib. Patient currently asymptomatic, hemodynamically stable. No abnormal findings on physical exam. Will check labs including troponin. 89M with significant cardiac history including prior CABG and Afib on Coumadin p/w left arm paresthesias and pain concerning for angina vs. TIA. ECG appears unchanged from prior, reveals LBBB and Afib. Patient currently asymptomatic, hemodynamically stable. No abnormal findings on physical exam. Will check basic labs, troponin, coags. Ordered for CTH, CXR. 89M with significant cardiac history including prior CABG and Afib on Coumadin p/w left arm paresthesias and pain concerning for angina vs. TIA. ECG appears unchanged from prior, reveals LBBB and Afib. Patient currently asymptomatic, hemodynamically stable. No abnormal findings on physical exam. Will check basic labs, troponin, coags, dig level. Ordered for CTH, CXR. Neurology consulted.

## 2018-09-07 NOTE — CONSULT NOTE ADULT - NSHPATTENDINGPLANDISCUSS_GEN_ALL_CORE
neurology resident as above and I agree with assessment and plan as outlined and outpatient neurology follow up.

## 2018-09-07 NOTE — ED ADULT NURSE NOTE - PSH
Artificial pacemaker  [employer] Model #[id number] Serial #[id number]  Breast cyst  bilateral around 50 years ago  S/P CABG (coronary artery bypass graft)  x2 2013  S/P heart valve repair  mitral valve repair with annuloplasty ring 2013  S/P small bowel resection  due to perforated intestine  during colonoscopy around 2000  Stented coronary artery  1996 x1

## 2018-09-07 NOTE — ED PROVIDER NOTE - CARE PLAN
Principal Discharge DX:	Paresthesia of arm  Assessment and plan of treatment:	You came to the hospital because of numbness and tingling in your left hand as well as left arm pain. You had an evaluation of your heart and your brain to rule out serious causes of these symptoms. A CT scan of your brain showed no evidence of stroke. Your EKG and heart enzymes were within normal limits. You were seen by the neurologists here who would like you to see them in their office once you leave the hospital. Please call [phone] to schedule an appointment with Neurology, located at [address] [place] [address]. Please call your cardiologist to schedule an appointment within 1 week of leaving the hospital. If you develop worsening chest pain, numbness, weakness on one side of your body, or difficulty speaking, please seek emergent care.

## 2018-09-07 NOTE — ED PROVIDER NOTE - PMH
Atrial fibrillation    BPH (benign prostatic hyperplasia)    CAD (coronary artery disease)  x1 stent 1996  S/P CABGx2 2005  Cataract    GERD (gastroesophageal reflux disease)    Glaucoma    HLD (hyperlipidemia)    HTN (hypertension)    Phimosis    Seizure  age 9, had 1 seizure, s/p fall  Valvular heart disease  S/p mitral valve repair

## 2018-09-07 NOTE — ED PROVIDER NOTE - PHYSICAL EXAMINATION
General: Appears comfortable, NAD  HEENT: EOMI, PERRLA, normal sclera and conjunctiva; normal oropharynx  Neck: Supple, no JVD, no LAD  Chest/Lungs: CTAB  Heart: Irregularly irregular, +2/6 JUAN C at LUSB with radiation to carotids  Abd: Soft, minimally distended, NTTP  Extremities: 1+ peripheral pulses, trace pedal edema  Skin: Warm, well-perfused  Neurologic: A&Ox3, CNs II-XII intact, sensation grossly intact, strength 5/5 in UE and LE B/L

## 2018-09-07 NOTE — CONSULT NOTE ADULT - ASSESSMENT
89 y/o man w/ PMH A fib on coumadin, CAD s/p PPM, HTN, HLD, DM II c/o L hand sensory changes this AM, involving the 3rd, 4th, and 5th digits, as well as L forearm along the medial aspect, transient x 1 hour. Pt is now back to baseline. Neurologic exam is non-focal. CTH pending.   Impression: Given distribution of symptoms and lack of other focal deficits while these sensory symptoms were occurring, this episode is more consistent neurologically with a peripheral neuropathy, such as a transient ulnar neuropathy (involving the medial cutaneous nerve of the forearm and the dorsal cutaneous branch of the ulnar nerve) vs a radiculopathy. Less consistent with a TIA as well given a therapeutic INR.     Recommend:  - F/u CTH, if no acute pathology, patient should follow up outpatient with neurology for further evaluation, possible NCS/EMG, at [address] [place] [address], [phone]. 89 y/o man w/ PMH A fib on coumadin, CAD s/p PPM, HTN, HLD, DM II c/o L hand sensory changes this AM, involving the 3rd, 4th, and 5th digits, as well as L forearm along the medial aspect, transient x 1 hour. Pt is now back to baseline. Neurologic exam is non-focal. CTH pending.   Impression: Given distribution of symptoms and lack of other focal deficits while these sensory symptoms were occurring, this episode is more consistent neurologically with a peripheral neuropathy, such as a transient ulnar neuropathy (involving the medial cutaneous nerve of the forearm and the dorsal cutaneous branch of the ulnar nerve) vs cervical radiculopathy. Less consistent with a TIA as well given a therapeutic INR.     Recommend:  - F/u CTH, if no acute pathology, patient should follow up outpatient with neurology for further evaluation, possible NCS/EMG, at [address] [place] [address], [phone].

## 2018-09-07 NOTE — ED ADULT TRIAGE NOTE - CHIEF COMPLAINT QUOTE
Patient brought to ER from home by EMS for a feeling that he experienced in his left hand yesterday and today. A little tingling and numbness that has subsided.

## 2018-09-07 NOTE — CONSULT NOTE ADULT - SUBJECTIVE AND OBJECTIVE BOX
Neurology Consult Note    Name  SONIA JACQUES    HPI:  89 y/o man w/ PMH A fib on coumadin, CAD s/p PPM, HTN, HLD, DM II c/o L hand sensory changes this AM. Pt awoke with this feeling of his hand being "asleep," in the 3rd, 4th, and 5th digits. The feeling then progressed to a discomfort located in the medial aspect of his forearm. He did a stroke exam on himself and found nothing abnormal, including his sensation. No weakness. He did not have any symptoms in his face or lower extremities. Denies radiation up to his neck, denies neck or back pain. He denies     Review of Systems:  CONSTITUTIONAL:  No weight loss, fever, chills, weakness or fatigue.  HEENT:  Eyes:  No visual loss, blurred vision, double vision or yellow sclerae. Ears, Nose, Throat:  No hearing loss, sneezing, congestion, runny nose or sore throat.  SKIN:  No rash or itching.  CARDIOVASCULAR:  No chest pain, chest pressure or chest discomfort. No palpitations or edema.  RESPIRATORY:  No shortness of breath, cough or sputum.  GASTROINTESTINAL:  No anorexia, nausea, vomiting or diarrhea. No abdominal pain or blood.  GENITOURINARY: No Burning on urination.   NEUROLOGICAL:  see HPI  MUSCULOSKELETAL:  No muscle, back pain, joint pain or stiffness.  HEMATOLOGIC:  No anemia, bleeding or bruising.  LYMPHATICS:  No enlarged nodes. No history of splenectomy.  PSYCHIATRIC:  No history of depression or anxiety.  ENDOCRINOLOGIC:  No reports of sweating, cold or heat intolerance. No polyuria or polydipsia.  ALLERGIES:  No history of asthma, hives, eczema or rhinitis.    MEDICATIONS  (STANDING):    MEDICATIONS  (PRN):      Allergies      Intolerances      PAST MEDICAL & SURGICAL HISTORY:  Phimosis  Seizure: age 9, had 1 seizure, s/p fall  GERD (gastroesophageal reflux disease)  Valvular heart disease: S/p mitral valve repair  CAD (coronary artery disease): x1 stent 1996  S/P CABGx2 2005  BPH (benign prostatic hyperplasia)  HLD (hyperlipidemia)  HTN (hypertension)  Atrial fibrillation  CAD (coronary artery disease)  BPH (Benign Prostatic Hyperplasia)  Cataract  Glaucoma  HTN (hypertension)  Artificial pacemaker: St [employer] Model #[id number] Serial #[id number]  S/P heart valve repair: mitral valve repair with annuloplasty ring 2013  S/P CABG (coronary artery bypass graft): x2 2013  S/P small bowel resection: due to perforated intestine  during colonoscopy around 2000  Breast cyst: bilateral around 50 years ago  Stented coronary artery: 1996 x1    FH:  SH:    Objective:   Vital Signs Last 24 Hrs  T(C): 36.8 (07 Sep 2018 08:57), Max: 36.8 (07 Sep 2018 08:57)  T(F): 98.2 (07 Sep 2018 08:57), Max: 98.2 (07 Sep 2018 08:57)  HR: 85 (07 Sep 2018 08:57) (85 - 85)  BP: 147/80 (07 Sep 2018 08:57) (147/80 - 147/80)  BP(mean): --  RR: 18 (07 Sep 2018 08:57) (18 - 18)  SpO2: 99% (07 Sep 2018 08:57) (99% - 99%)    General Exam:   General appearance: No acute distress                 General Adult Exam  GENERAL APPEARANCE: Well developed, well nourished, alert and cooperative, and appears to be in no acute distress.  EYES: PERRL, EOMI. Fundi normal, vision is grossly intact.  EARS: External auditory canals and tympanic membranes clear, hearing grossly intact.  NOSE: No nasal discharge.  CARDIAC: Normal S1 and S2. No S3, S4 or murmurs. Rhythm is regular. There is no peripheral edema, cyanosis or pallor. Extremities are warm and well perfused. Capillary refill is less than 2 seconds. No carotid bruits.  LUNGS: Clear to auscultation and percussion without rales, rhonchi, wheezing or diminished breath sounds.  ABDOMEN: Positive bowel sounds. Soft, nondistended, nontender. No guarding or rebound. No masses.  MUSKULOSKELETAL: Adequately aligned spine. ROM intact spine and extremities. No joint erythema or tenderness. Normal muscular development. Normal gait.  BACK: Examination of the spine reveals normal gait and posture, no spinal deformity, symmetry of spinal muscles, without tenderness, decreased range of motion or muscular spasm.  EXTREMITIES: No significant deformity or joint abnormality. No edema. Peripheral pulses intact. No varicosities.  SKIN: Skin normal color, texture and turgor with no lesions or eruptions.    Neurological Exam:  Mental Status: Orientated to self, date and place.  Attention intact.  No dysarthria, aphasia or neglect.  Knowledge intact.  Registration intact.  Short and long term memory grossly intact.      Cranial Nerves: CN I - not tested.  PERRL, EOMI, VFF, no nystagmus or diplopia.  No APD.  Fundi not visualized bilaterally.  CN V1-3 intact to light touch and pinprick.  No facial asymmetry.  Hearing intact to finger rub bilaterally.  Tongue, uvula and palate midline.  Sternocleidomastoid and Trapezius intact bilaterally.    Motor:   Tone: normal.                  Strength:     [] Upper extremity                      Delt       Bicep    Tricep                                                  R         5/5 5/5 5/5 5/5                                               L          5/5 5/5 5/5 5/5  [] Lower extremity                       HF          KE          KF        DF         PF                                               R        5/5 5/5 5/5 5/5 5/5                                               L         5/5 5/5 5/5 5/5 5/5  Pronator drift: none                 Dysmetria: None to finger-nose-finger or heel-shin-heel  No truncal ataxia.    Tremor: No resting, postural or action tremor.  No myoclonus.    Sensation: intact to light touch, pinprick, vibration and proprioception    Deep Tendon Reflexes: 1+ bilateral biceps, triceps, brachioradialis, knee and ankle  Toes flexor bilaterally    Gait: normal and stable.        Other:                        12.2   7.12  )-----------( 104      ( 07 Sep 2018 09:45 )             37.1     09-07    138  |  101  |  20  ----------------------------<  199<H>  4.2   |  27  |  1.10    Ca    9.5      07 Sep 2018 09:45  Phos  2.5     09-07  Mg     2.0     09-07        PT/INR - ( 07 Sep 2018 10:00 )   PT: 34.6 SEC;   INR: 3.05          PTT - ( 07 Sep 2018 10:00 )  PTT:50.3 SEC    Radiology    EKG:  tele:  TTE:  EEG: Neurology Consult Note    Name  SONIA JACQUES    HPI:  89 y/o man w/ PMH A fib on coumadin, CAD s/p PPM, HTN, HLD, DM II c/o L hand sensory changes this AM. Pt awoke with this feeling of his hand being "asleep," in the 3rd, 4th, and 5th digits. The feeling then progressed to a discomfort located in the medial aspect of his forearm. He did a stroke exam on himself and found nothing abnormal, including his sensation. No weakness. He did not have any symptoms in his face or lower extremities. Denies radiation up to his neck, denies neck or back pain. He denies visual changes, headache, or speech disturbance. Sensation lasted x 1 hour and then resolved.    Review of Systems:  CONSTITUTIONAL:  No weight loss, fever, chills, weakness or fatigue.  HEENT:  Eyes:  No visual loss, blurred vision, double vision or yellow sclerae. Ears, Nose, Throat:  No hearing loss, sneezing, congestion, runny nose or sore throat.  SKIN:  No rash or itching.  CARDIOVASCULAR:  No chest pain, chest pressure or chest discomfort. No palpitations or edema.  RESPIRATORY:  No shortness of breath, cough or sputum.  GASTROINTESTINAL:  No anorexia, nausea, vomiting or diarrhea. No abdominal pain or blood.  GENITOURINARY: No Burning on urination.   NEUROLOGICAL:  see HPI  MUSCULOSKELETAL:  No muscle, back pain, joint pain or stiffness.  HEMATOLOGIC:  No anemia, bleeding or bruising.  LYMPHATICS:  No enlarged nodes. No history of splenectomy.  PSYCHIATRIC:  No history of depression or anxiety.  ENDOCRINOLOGIC:  No reports of sweating, cold or heat intolerance. No polyuria or polydipsia.  ALLERGIES:  No history of asthma, hives, eczema or rhinitis.    MEDICATIONS  (STANDING):  Coumadin    Allergies  NKA    PAST MEDICAL & SURGICAL HISTORY:  Phimosis  Seizure: age 9, had 1 seizure, s/p fall  GERD (gastroesophageal reflux disease)  Valvular heart disease: S/p mitral valve repair  CAD (coronary artery disease): x1 stent 1996  S/P CABGx2 2005  BPH (benign prostatic hyperplasia)  HLD (hyperlipidemia)  HTN (hypertension)  Atrial fibrillation  CAD (coronary artery disease)  BPH (Benign Prostatic Hyperplasia)  Cataract  Glaucoma  HTN (hypertension)  Artificial pacemaker: St [employer] Model #[id number] Serial #[id number]  S/P heart valve repair: mitral valve repair with annuloplasty ring 2013  S/P CABG (coronary artery bypass graft): x2 2013  S/P small bowel resection: due to perforated intestine  during colonoscopy around 2000  Breast cyst: bilateral around 50 years ago  Stented coronary artery: 1996 x1    FH: NC    SH: Lives with children. Denies EtOH or drug use    Objective:   Vital Signs Last 24 Hrs  T(C): 36.8 (07 Sep 2018 08:57), Max: 36.8 (07 Sep 2018 08:57)  T(F): 98.2 (07 Sep 2018 08:57), Max: 98.2 (07 Sep 2018 08:57)  HR: 85 (07 Sep 2018 08:57) (85 - 85)  BP: 147/80 (07 Sep 2018 08:57) (147/80 - 147/80)  RR: 18 (07 Sep 2018 08:57) (18 - 18)  SpO2: 99% (07 Sep 2018 08:57) (99% - 99%)    General Exam:   General appearance: No acute distress                   Neurological Exam:  Mental Status: Orientated to self, date and place.  Attention intact.  No dysarthria, aphasia or neglect.  Knowledge intact.  Registration intact.  Short and long term memory grossly intact.      Cranial Nerves: PERRL, EOMI, VFF CN V1-3 intact to light touch.  No facial asymmetry.  Tongue, uvula and palate midline.  Sternocleidomastoid and Trapezius intact bilaterally.    Motor:   Tone: normal.                  Strength:     [] Upper extremity                      Delt       Bicep    Tricep                                                  R         5/5        5/5        5/5       5/5                                               L          5/5        5/5        5/5       5/5  [] Lower extremity                       HF          KE          KF        DF         PF                                               R        5/5        5/5        5/5       5/5       5/5                                               L         5/5        5/5       5/5       5/5        5/5  Pronator drift: none                 Tremor: No resting, postural or action tremor.  No myoclonus.    Sensation: intact to light touch, pinprick, vibration and proprioception x 4 extremities    Deep Tendon Reflexes: 1+ bilateral biceps, triceps, brachioradialis, and knee, absent ankle  Toes flexor bilaterally    Coord: No ataxia or dysmetria on FTN b/l      Other:                        12.2   7.12  )-----------( 104      ( 07 Sep 2018 09:45 )             37.1     09-07    138  |  101  |  20  ----------------------------<  199<H>  4.2   |  27  |  1.10    Ca    9.5      07 Sep 2018 09:45  Phos  2.5     09-07  Mg     2.0     09-07      PT/INR - ( 07 Sep 2018 10:00 )   PT: 34.6 SEC;   INR: 3.05          PTT - ( 07 Sep 2018 10:00 )  PTT:50.3 SEC    Radiology    [facility] pending Neurology Consult Note    Name  SONIA JACQUES    HPI:  89 y/o man w/ PMH A fib on coumadin, CAD s/p PPM, HTN, HLD, DM II c/o L hand sensory changes this AM. Pt awoke with this feeling of his hand being "asleep," in the 3rd, 4th, and 5th digits. The feeling then progressed to a discomfort located in the medial aspect of his forearm. He did a stroke exam on himself and found nothing abnormal, including his sensation. No weakness. He did not have any symptoms in his face or lower extremities. Denies radiation up to his neck, denies neck or back pain. He denies visual changes, headache, or speech disturbance. Sensation lasted x 1 hour and then resolved.    Review of Systems:  CONSTITUTIONAL:  No weight loss, fever, chills, weakness or fatigue.  HEENT:  Eyes:  No visual loss, blurred vision, double vision or yellow sclerae. Ears, Nose, Throat:  No hearing loss, sneezing, congestion, runny nose or sore throat.  SKIN:  No rash or itching.  CARDIOVASCULAR:  No chest pain, chest pressure or chest discomfort. No palpitations or edema.  RESPIRATORY:  No shortness of breath, cough or sputum.  GASTROINTESTINAL:  No anorexia, nausea, vomiting or diarrhea. No abdominal pain or blood.  GENITOURINARY: No Burning on urination.   NEUROLOGICAL:  see HPI  MUSCULOSKELETAL:  No muscle, back pain, joint pain or stiffness.  HEMATOLOGIC:  No anemia, bleeding or bruising.  LYMPHATICS:  No enlarged nodes. No history of splenectomy.  PSYCHIATRIC:  No history of depression or anxiety.  ENDOCRINOLOGIC:  No reports of sweating, cold or heat intolerance. No polyuria or polydipsia.  ALLERGIES:  No history of asthma, hives, eczema or rhinitis.    MEDICATIONS  (STANDING):  Coumadin    Allergies  NKA    PAST MEDICAL & SURGICAL HISTORY:  Phimosis  Seizure: age 9, had 1 seizure, s/p fall  GERD (gastroesophageal reflux disease)  Valvular heart disease: S/p mitral valve repair  CAD (coronary artery disease): x1 stent 1996  S/P CABGx2 2005  BPH (benign prostatic hyperplasia)  HLD (hyperlipidemia)  HTN (hypertension)  Atrial fibrillation  CAD (coronary artery disease)  BPH (Benign Prostatic Hyperplasia)  Cataract  Glaucoma  HTN (hypertension)  Artificial pacemaker: St [employer] Model #[id number] Serial #[id number]  S/P heart valve repair: mitral valve repair with annuloplasty ring 2013  S/P CABG (coronary artery bypass graft): x2 2013  S/P small bowel resection: due to perforated intestine  during colonoscopy around 2000  Breast cyst: bilateral around 50 years ago  Stented coronary artery: 1996 x1    FH: NC    SH: Lives with children. Denies EtOH or drug use    Objective:   Vital Signs Last 24 Hrs  T(C): 36.8 (07 Sep 2018 08:57), Max: 36.8 (07 Sep 2018 08:57)  T(F): 98.2 (07 Sep 2018 08:57), Max: 98.2 (07 Sep 2018 08:57)  HR: 85 (07 Sep 2018 08:57) (85 - 85)  BP: 147/80 (07 Sep 2018 08:57) (147/80 - 147/80)  RR: 18 (07 Sep 2018 08:57) (18 - 18)  SpO2: 99% (07 Sep 2018 08:57) (99% - 99%)    General Exam:   General appearance: No acute distress                   Neurological Exam:  Mental Status: Orientated to self, date and place.  Attention intact.  No dysarthria, aphasia or neglect.  Knowledge intact.  Registration intact.  Short and long term memory grossly intact.      Cranial Nerves: PERRL, EOMI, VFF CN V1-3 intact to light touch.  No facial asymmetry.  Tongue, uvula and palate midline.  Sternocleidomastoid and Trapezius intact bilaterally. Mild dysarthria (baseline per pt and family due to dentures)    Motor:   Tone: normal.                  Strength:     [] Upper extremity                      Delt       Bicep    Tricep                                                  R         5/5        5/5        5/5       5/5                                               L          5/5        5/5        5/5       5/5  [] Lower extremity                       HF          KE          KF        DF         PF                                               R        5/5        5/5        5/5       5/5       5/5                                               L         5/5        5/5       5/5       5/5        5/5  Pronator drift: none                 Tremor: No resting, postural or action tremor.  No myoclonus.    Sensation: intact to light touch, pinprick, vibration and proprioception x 4 extremities    Deep Tendon Reflexes: 1+ bilateral biceps, triceps, brachioradialis, and knee, absent ankle  Toes flexor bilaterally    Coord: No ataxia or dysmetria on FTN b/l      Other:                        12.2   7.12  )-----------( 104      ( 07 Sep 2018 09:45 )             37.1     09-07    138  |  101  |  20  ----------------------------<  199<H>  4.2   |  27  |  1.10    Ca    9.5      07 Sep 2018 09:45  Phos  2.5     09-07  Mg     2.0     09-07      PT/INR - ( 07 Sep 2018 10:00 )   PT: 34.6 SEC;   INR: 3.05          PTT - ( 07 Sep 2018 10:00 )  PTT:50.3 SEC    Radiology    [facility] pending

## 2018-09-07 NOTE — ED PROVIDER NOTE - PLAN OF CARE
You came to the hospital because of numbness and tingling in your left hand as well as left arm pain. You had an evaluation of your heart and your brain to rule out serious causes of these symptoms. A CT scan of your brain showed no evidence of stroke. Your EKG and heart enzymes were within normal limits. You were seen by the neurologists here who would like you to see them in their office once you leave the hospital. Please call [phone] to schedule an appointment with Neurology, located at [address] [place] [address]. Please call your cardiologist to schedule an appointment within 1 week of leaving the hospital. If you develop worsening chest pain, numbness, weakness on one side of your body, or difficulty speaking, please seek emergent care.

## 2018-09-07 NOTE — ED PROVIDER NOTE - PSH
Artificial pacemaker  [employer] Model #[id number] Serial #[id number]  Breast cyst  bilateral around 50 years ago  S/P CABG (coronary artery bypass graft)  x2 2013  S/P heart valve repair  mitral valve repair with annuloplasty ring 2013  S/P small bowel resection  due to perforated intestine  during colonoscopy around 2000  Stented coronary artery  1996 x1

## 2018-09-07 NOTE — ED ADULT NURSE NOTE - OBJECTIVE STATEMENT
Patient presented to ED A&O x4, with c/o left arm numbness, tingling, and discomfort since 6AM.  States symptoms occurred last night and resolved and resumed this A.M., denies any SOB or CP. EKG done and cardiac monitor in place.  Blood work drawn and sent to lab. ER physician evaluated patient.  Family present at the bedside.  Will continue to monitor patient closely. Sugar MENA

## 2018-09-07 NOTE — ED PROVIDER NOTE - ATTENDING CONTRIBUTION TO CARE
89M h/o afib w pacemaker on coumadin, CAD s/p CABG, MVR, GERD, HTN, HLD presents with L hand numbness over 4th and 5th digits and L arm cramping. Symptoms occurred yesterday for several hours and resolved, then recurred again this morning lasting for several hours, now improved. No weakness. No prior h/o similar symptoms. No CP/SOB, no HA. No dizziness. Lives at home,  high functioning. On exam well appearing, NAD, mmm, lungs CTAB, RRR, abdomen soft NT/ND, no edema,  2+ pulses b/l, neuro A&Ox3, no focal deficits, skin warm and dry, no rash. CT head negative. Seen by neuro, thought to be peripheral neuropathy. EKG no acute ischemia, serial trop negative. Plan for o/p neuro f/u and discharge home.

## 2018-09-13 ENCOUNTER — APPOINTMENT (OUTPATIENT)
Dept: NEUROLOGY | Facility: CLINIC | Age: 83
End: 2018-09-13
Payer: MEDICARE

## 2018-09-13 VITALS — DIASTOLIC BLOOD PRESSURE: 76 MMHG | HEIGHT: 65 IN | SYSTOLIC BLOOD PRESSURE: 136 MMHG | HEART RATE: 54 BPM

## 2018-09-13 DIAGNOSIS — R20.9 UNSPECIFIED DISTURBANCES OF SKIN SENSATION: ICD-10-CM

## 2018-09-13 DIAGNOSIS — E11.9 TYPE 2 DIABETES MELLITUS W/OUT COMPLICATIONS: ICD-10-CM

## 2018-09-13 PROCEDURE — 99205 OFFICE O/P NEW HI 60 MIN: CPT

## 2018-10-18 ENCOUNTER — APPOINTMENT (OUTPATIENT)
Dept: ELECTROPHYSIOLOGY | Facility: CLINIC | Age: 83
End: 2018-10-18
Payer: MEDICARE

## 2018-10-18 PROCEDURE — 93294 REM INTERROG EVL PM/LDLS PM: CPT

## 2018-10-18 PROCEDURE — 93296 REM INTERROG EVL PM/IDS: CPT

## 2018-10-25 ENCOUNTER — NON-APPOINTMENT (OUTPATIENT)
Age: 83
End: 2018-10-25

## 2018-10-25 ENCOUNTER — APPOINTMENT (OUTPATIENT)
Dept: CARDIOLOGY | Facility: CLINIC | Age: 83
End: 2018-10-25
Payer: MEDICARE

## 2018-10-25 VITALS
WEIGHT: 150 LBS | BODY MASS INDEX: 24.99 KG/M2 | DIASTOLIC BLOOD PRESSURE: 70 MMHG | HEIGHT: 65 IN | HEART RATE: 66 BPM | SYSTOLIC BLOOD PRESSURE: 129 MMHG | TEMPERATURE: 97.9 F | RESPIRATION RATE: 17 BRPM | OXYGEN SATURATION: 96 %

## 2018-10-25 PROCEDURE — 99215 OFFICE O/P EST HI 40 MIN: CPT

## 2018-10-25 PROCEDURE — 93000 ELECTROCARDIOGRAM COMPLETE: CPT

## 2019-01-01 ENCOUNTER — NON-APPOINTMENT (OUTPATIENT)
Age: 84
End: 2019-01-01

## 2019-01-01 ENCOUNTER — EMERGENCY (EMERGENCY)
Facility: HOSPITAL | Age: 84
LOS: 1 days | Discharge: HOME | End: 2019-01-01
Attending: EMERGENCY MEDICINE
Payer: COMMERCIAL

## 2019-01-01 ENCOUNTER — APPOINTMENT (OUTPATIENT)
Dept: UROLOGY | Facility: CLINIC | Age: 84
End: 2019-01-01
Payer: MEDICARE

## 2019-01-01 ENCOUNTER — RX RENEWAL (OUTPATIENT)
Age: 84
End: 2019-01-01

## 2019-01-01 ENCOUNTER — TRANSCRIPTION ENCOUNTER (OUTPATIENT)
Age: 84
End: 2019-01-01

## 2019-01-01 ENCOUNTER — APPOINTMENT (OUTPATIENT)
Dept: CARDIOLOGY | Facility: CLINIC | Age: 84
End: 2019-01-01
Payer: MEDICARE

## 2019-01-01 ENCOUNTER — MEDICATION RENEWAL (OUTPATIENT)
Age: 84
End: 2019-01-01

## 2019-01-01 ENCOUNTER — APPOINTMENT (OUTPATIENT)
Dept: ELECTROPHYSIOLOGY | Facility: CLINIC | Age: 84
End: 2019-01-01
Payer: MEDICARE

## 2019-01-01 ENCOUNTER — OUTPATIENT (OUTPATIENT)
Dept: OUTPATIENT SERVICES | Facility: HOSPITAL | Age: 84
LOS: 1 days | End: 2019-01-01
Payer: COMMERCIAL

## 2019-01-01 ENCOUNTER — MOBILE ON CALL (OUTPATIENT)
Age: 84
End: 2019-01-01

## 2019-01-01 ENCOUNTER — APPOINTMENT (OUTPATIENT)
Dept: UROLOGY | Facility: HOSPITAL | Age: 84
End: 2019-01-01

## 2019-01-01 ENCOUNTER — RESULT REVIEW (OUTPATIENT)
Age: 84
End: 2019-01-01

## 2019-01-01 ENCOUNTER — INPATIENT (INPATIENT)
Facility: HOSPITAL | Age: 84
LOS: 0 days | Discharge: HOME | DRG: 696 | End: 2019-11-29
Attending: UROLOGY | Admitting: UROLOGY
Payer: COMMERCIAL

## 2019-01-01 ENCOUNTER — INPATIENT (INPATIENT)
Facility: HOSPITAL | Age: 84
LOS: 12 days | Discharge: HOME | DRG: 291 | End: 2020-01-12
Attending: INTERNAL MEDICINE | Admitting: HOSPITALIST
Payer: COMMERCIAL

## 2019-01-01 VITALS
OXYGEN SATURATION: 98 % | BODY MASS INDEX: 24.66 KG/M2 | HEART RATE: 68 BPM | HEIGHT: 65 IN | SYSTOLIC BLOOD PRESSURE: 104 MMHG | DIASTOLIC BLOOD PRESSURE: 64 MMHG | WEIGHT: 148 LBS

## 2019-01-01 VITALS
RESPIRATION RATE: 16 BRPM | WEIGHT: 153 LBS | TEMPERATURE: 98 F | HEIGHT: 64 IN | HEART RATE: 89 BPM | OXYGEN SATURATION: 95 % | DIASTOLIC BLOOD PRESSURE: 73 MMHG | SYSTOLIC BLOOD PRESSURE: 152 MMHG

## 2019-01-01 VITALS
BODY MASS INDEX: 24.99 KG/M2 | HEART RATE: 69 BPM | SYSTOLIC BLOOD PRESSURE: 150 MMHG | WEIGHT: 150 LBS | OXYGEN SATURATION: 98 % | DIASTOLIC BLOOD PRESSURE: 81 MMHG | HEIGHT: 65 IN

## 2019-01-01 VITALS
RESPIRATION RATE: 17 BRPM | HEART RATE: 62 BPM | SYSTOLIC BLOOD PRESSURE: 123 MMHG | TEMPERATURE: 97 F | DIASTOLIC BLOOD PRESSURE: 61 MMHG | OXYGEN SATURATION: 97 %

## 2019-01-01 VITALS
SYSTOLIC BLOOD PRESSURE: 150 MMHG | HEART RATE: 89 BPM | HEIGHT: 65 IN | WEIGHT: 149.03 LBS | RESPIRATION RATE: 18 BRPM | OXYGEN SATURATION: 99 % | TEMPERATURE: 98 F | DIASTOLIC BLOOD PRESSURE: 81 MMHG

## 2019-01-01 VITALS
SYSTOLIC BLOOD PRESSURE: 149 MMHG | RESPIRATION RATE: 18 BRPM | DIASTOLIC BLOOD PRESSURE: 75 MMHG | HEART RATE: 69 BPM | WEIGHT: 147.05 LBS | HEIGHT: 66 IN | OXYGEN SATURATION: 99 % | TEMPERATURE: 98 F

## 2019-01-01 VITALS
TEMPERATURE: 98 F | DIASTOLIC BLOOD PRESSURE: 94 MMHG | HEART RATE: 61 BPM | OXYGEN SATURATION: 100 % | SYSTOLIC BLOOD PRESSURE: 187 MMHG | RESPIRATION RATE: 22 BRPM

## 2019-01-01 VITALS
OXYGEN SATURATION: 97 % | HEART RATE: 72 BPM | WEIGHT: 147.93 LBS | RESPIRATION RATE: 18 BRPM | SYSTOLIC BLOOD PRESSURE: 152 MMHG | DIASTOLIC BLOOD PRESSURE: 68 MMHG | TEMPERATURE: 98 F | HEIGHT: 65 IN

## 2019-01-01 VITALS
DIASTOLIC BLOOD PRESSURE: 76 MMHG | HEART RATE: 82 BPM | SYSTOLIC BLOOD PRESSURE: 150 MMHG | RESPIRATION RATE: 16 BRPM | OXYGEN SATURATION: 97 % | TEMPERATURE: 98 F

## 2019-01-01 VITALS
OXYGEN SATURATION: 100 % | RESPIRATION RATE: 18 BRPM | SYSTOLIC BLOOD PRESSURE: 161 MMHG | WEIGHT: 147.93 LBS | HEIGHT: 65 IN | HEART RATE: 115 BPM | DIASTOLIC BLOOD PRESSURE: 99 MMHG | TEMPERATURE: 98 F

## 2019-01-01 VITALS
HEART RATE: 80 BPM | OXYGEN SATURATION: 98 % | WEIGHT: 147.93 LBS | DIASTOLIC BLOOD PRESSURE: 95 MMHG | SYSTOLIC BLOOD PRESSURE: 180 MMHG | TEMPERATURE: 98 F | RESPIRATION RATE: 18 BRPM | HEIGHT: 65 IN

## 2019-01-01 VITALS
HEIGHT: 64 IN | SYSTOLIC BLOOD PRESSURE: 120 MMHG | DIASTOLIC BLOOD PRESSURE: 75 MMHG | RESPIRATION RATE: 22 BRPM | TEMPERATURE: 98 F | HEART RATE: 96 BPM | OXYGEN SATURATION: 92 % | WEIGHT: 149.91 LBS

## 2019-01-01 VITALS
WEIGHT: 153 LBS | TEMPERATURE: 98 F | DIASTOLIC BLOOD PRESSURE: 82 MMHG | OXYGEN SATURATION: 99 % | HEART RATE: 80 BPM | SYSTOLIC BLOOD PRESSURE: 148 MMHG | HEIGHT: 64 IN | RESPIRATION RATE: 18 BRPM

## 2019-01-01 VITALS — SYSTOLIC BLOOD PRESSURE: 149 MMHG | DIASTOLIC BLOOD PRESSURE: 81 MMHG

## 2019-01-01 VITALS — HEART RATE: 70 BPM | RESPIRATION RATE: 14 BRPM | SYSTOLIC BLOOD PRESSURE: 128 MMHG | DIASTOLIC BLOOD PRESSURE: 70 MMHG

## 2019-01-01 VITALS
HEART RATE: 61 BPM | BODY MASS INDEX: 24.99 KG/M2 | SYSTOLIC BLOOD PRESSURE: 126 MMHG | HEIGHT: 65 IN | WEIGHT: 150 LBS | DIASTOLIC BLOOD PRESSURE: 73 MMHG | OXYGEN SATURATION: 97 %

## 2019-01-01 VITALS
RESPIRATION RATE: 18 BRPM | TEMPERATURE: 98 F | OXYGEN SATURATION: 96 % | OXYGEN SATURATION: 98 % | HEART RATE: 61 BPM | DIASTOLIC BLOOD PRESSURE: 80 MMHG | RESPIRATION RATE: 13 BRPM | HEART RATE: 68 BPM | WEIGHT: 148 LBS | DIASTOLIC BLOOD PRESSURE: 82 MMHG | BODY MASS INDEX: 24.66 KG/M2 | HEIGHT: 65 IN | SYSTOLIC BLOOD PRESSURE: 167 MMHG | SYSTOLIC BLOOD PRESSURE: 130 MMHG

## 2019-01-01 VITALS
HEIGHT: 65 IN | SYSTOLIC BLOOD PRESSURE: 132 MMHG | WEIGHT: 145.95 LBS | DIASTOLIC BLOOD PRESSURE: 67 MMHG | RESPIRATION RATE: 18 BRPM | HEART RATE: 60 BPM | OXYGEN SATURATION: 95 % | TEMPERATURE: 98 F

## 2019-01-01 VITALS
RESPIRATION RATE: 14 BRPM | DIASTOLIC BLOOD PRESSURE: 80 MMHG | OXYGEN SATURATION: 95 % | SYSTOLIC BLOOD PRESSURE: 130 MMHG | HEART RATE: 76 BPM | WEIGHT: 150 LBS | HEIGHT: 65 IN | BODY MASS INDEX: 24.99 KG/M2

## 2019-01-01 VITALS
DIASTOLIC BLOOD PRESSURE: 80 MMHG | HEART RATE: 75 BPM | BODY MASS INDEX: 24.83 KG/M2 | RESPIRATION RATE: 17 BRPM | SYSTOLIC BLOOD PRESSURE: 146 MMHG | OXYGEN SATURATION: 97 % | WEIGHT: 149 LBS | TEMPERATURE: 97.9 F | HEIGHT: 65 IN

## 2019-01-01 VITALS
DIASTOLIC BLOOD PRESSURE: 80 MMHG | TEMPERATURE: 98 F | RESPIRATION RATE: 18 BRPM | OXYGEN SATURATION: 98 % | SYSTOLIC BLOOD PRESSURE: 158 MMHG | HEART RATE: 82 BPM

## 2019-01-01 VITALS
RESPIRATION RATE: 17 BRPM | DIASTOLIC BLOOD PRESSURE: 75 MMHG | HEART RATE: 118 BPM | OXYGEN SATURATION: 97 % | SYSTOLIC BLOOD PRESSURE: 148 MMHG | TEMPERATURE: 98 F

## 2019-01-01 VITALS — DIASTOLIC BLOOD PRESSURE: 70 MMHG | SYSTOLIC BLOOD PRESSURE: 130 MMHG

## 2019-01-01 DIAGNOSIS — I10 ESSENTIAL (PRIMARY) HYPERTENSION: ICD-10-CM

## 2019-01-01 DIAGNOSIS — R31.0 GROSS HEMATURIA: ICD-10-CM

## 2019-01-01 DIAGNOSIS — Z87.438 PERSONAL HISTORY OF OTHER DISEASES OF MALE GENITAL ORGANS: ICD-10-CM

## 2019-01-01 DIAGNOSIS — Z98.89 OTHER SPECIFIED POSTPROCEDURAL STATES: Chronic | ICD-10-CM

## 2019-01-01 DIAGNOSIS — Z95.0 PRESENCE OF CARDIAC PACEMAKER: Chronic | ICD-10-CM

## 2019-01-01 DIAGNOSIS — I48.91 UNSPECIFIED ATRIAL FIBRILLATION: ICD-10-CM

## 2019-01-01 DIAGNOSIS — Z95.1 PRESENCE OF AORTOCORONARY BYPASS GRAFT: Chronic | ICD-10-CM

## 2019-01-01 DIAGNOSIS — I49.5 SICK SINUS SYNDROME: ICD-10-CM

## 2019-01-01 DIAGNOSIS — C67.9 MALIGNANT NEOPLASM OF BLADDER, UNSPECIFIED: ICD-10-CM

## 2019-01-01 DIAGNOSIS — Z98.890 OTHER SPECIFIED POSTPROCEDURAL STATES: Chronic | ICD-10-CM

## 2019-01-01 DIAGNOSIS — J40 BRONCHITIS, NOT SPECIFIED AS ACUTE OR CHRONIC: ICD-10-CM

## 2019-01-01 DIAGNOSIS — R31.9 HEMATURIA, UNSPECIFIED: ICD-10-CM

## 2019-01-01 DIAGNOSIS — Z01.818 ENCOUNTER FOR OTHER PREPROCEDURAL EXAMINATION: ICD-10-CM

## 2019-01-01 DIAGNOSIS — R60.9 EDEMA, UNSPECIFIED: ICD-10-CM

## 2019-01-01 DIAGNOSIS — R06.02 SHORTNESS OF BREATH: ICD-10-CM

## 2019-01-01 DIAGNOSIS — R05 COUGH: ICD-10-CM

## 2019-01-01 DIAGNOSIS — Z29.9 ENCOUNTER FOR PROPHYLACTIC MEASURES, UNSPECIFIED: ICD-10-CM

## 2019-01-01 DIAGNOSIS — E78.5 HYPERLIPIDEMIA, UNSPECIFIED: ICD-10-CM

## 2019-01-01 DIAGNOSIS — N40.0 BENIGN PROSTATIC HYPERPLASIA WITHOUT LOWER URINARY TRACT SYMPTOMS: ICD-10-CM

## 2019-01-01 DIAGNOSIS — I48.92 UNSPECIFIED ATRIAL FLUTTER: ICD-10-CM

## 2019-01-01 DIAGNOSIS — Z79.01 LONG TERM (CURRENT) USE OF ANTICOAGULANTS: ICD-10-CM

## 2019-01-01 DIAGNOSIS — Z95.0 PRESENCE OF CARDIAC PACEMAKER: ICD-10-CM

## 2019-01-01 DIAGNOSIS — I48.2 CHRONIC ATRIAL FIBRILLATION: ICD-10-CM

## 2019-01-01 LAB
ALBUMIN SERPL ELPH-MCNC: 3.8 G/DL — SIGNIFICANT CHANGE UP (ref 3.3–5)
ALBUMIN SERPL ELPH-MCNC: 3.9 G/DL — SIGNIFICANT CHANGE UP (ref 3.3–5)
ALBUMIN SERPL ELPH-MCNC: 4.1 G/DL — SIGNIFICANT CHANGE UP (ref 3.3–5)
ALBUMIN SERPL ELPH-MCNC: 4.1 G/DL — SIGNIFICANT CHANGE UP (ref 3.3–5)
ALBUMIN SERPL ELPH-MCNC: 4.2 G/DL — SIGNIFICANT CHANGE UP (ref 3.3–5)
ALBUMIN SERPL ELPH-MCNC: 4.3 G/DL — SIGNIFICANT CHANGE UP (ref 3.3–5)
ALBUMIN SERPL ELPH-MCNC: 4.4 G/DL — SIGNIFICANT CHANGE UP (ref 3.3–5)
ALP SERPL-CCNC: 170 U/L — HIGH (ref 40–120)
ALP SERPL-CCNC: 179 U/L — HIGH (ref 40–120)
ALP SERPL-CCNC: 190 U/L — HIGH (ref 40–120)
ALP SERPL-CCNC: 232 U/L — HIGH (ref 40–120)
ALP SERPL-CCNC: 237 U/L — HIGH (ref 40–120)
ALP SERPL-CCNC: 237 U/L — HIGH (ref 40–120)
ALP SERPL-CCNC: 257 U/L — HIGH (ref 40–120)
ALT FLD-CCNC: 14 U/L — SIGNIFICANT CHANGE UP (ref 10–45)
ALT FLD-CCNC: 16 U/L — SIGNIFICANT CHANGE UP (ref 10–45)
ALT FLD-CCNC: 22 U/L — SIGNIFICANT CHANGE UP (ref 10–45)
ALT FLD-CCNC: 22 U/L — SIGNIFICANT CHANGE UP (ref 10–45)
ALT FLD-CCNC: 24 U/L — SIGNIFICANT CHANGE UP (ref 10–45)
ALT FLD-CCNC: 28 U/L — SIGNIFICANT CHANGE UP (ref 10–45)
ALT FLD-CCNC: 28 U/L — SIGNIFICANT CHANGE UP (ref 10–45)
ANION GAP SERPL CALC-SCNC: 10 MMOL/L — SIGNIFICANT CHANGE UP (ref 5–17)
ANION GAP SERPL CALC-SCNC: 10 MMOL/L — SIGNIFICANT CHANGE UP (ref 5–17)
ANION GAP SERPL CALC-SCNC: 11 MMOL/L — SIGNIFICANT CHANGE UP (ref 5–17)
ANION GAP SERPL CALC-SCNC: 11 MMOL/L — SIGNIFICANT CHANGE UP (ref 5–17)
ANION GAP SERPL CALC-SCNC: 13 MMOL/L — SIGNIFICANT CHANGE UP (ref 5–17)
ANION GAP SERPL CALC-SCNC: 13 MMOL/L — SIGNIFICANT CHANGE UP (ref 5–17)
ANION GAP SERPL CALC-SCNC: 14 MMOL/L — SIGNIFICANT CHANGE UP (ref 5–17)
ANION GAP SERPL CALC-SCNC: 15 MMOL/L — SIGNIFICANT CHANGE UP (ref 5–17)
ANION GAP SERPL CALC-SCNC: 17 MMOL/L — SIGNIFICANT CHANGE UP (ref 5–17)
ANION GAP SERPL CALC-SCNC: 9 MMOL/L — SIGNIFICANT CHANGE UP (ref 5–17)
ANISOCYTOSIS BLD QL: SLIGHT — SIGNIFICANT CHANGE UP
APPEARANCE UR: ABNORMAL
APPEARANCE UR: ABNORMAL
APPEARANCE UR: CLEAR — SIGNIFICANT CHANGE UP
APPEARANCE UR: CLEAR — SIGNIFICANT CHANGE UP
APTT BLD: 35 SEC — SIGNIFICANT CHANGE UP (ref 27.5–36.3)
APTT BLD: 36.6 SEC — HIGH (ref 27.5–36.3)
APTT BLD: 37.4 SEC — HIGH (ref 27.5–36.3)
APTT BLD: 38.4 SEC — HIGH (ref 27.5–36.3)
APTT BLD: 43.2 SEC — HIGH (ref 27.5–36.3)
APTT BLD: 45.4 SEC — HIGH (ref 27.5–36.3)
APTT BLD: 47.5 SEC — HIGH (ref 27.5–36.3)
AST SERPL-CCNC: 20 U/L — SIGNIFICANT CHANGE UP (ref 10–40)
AST SERPL-CCNC: 22 U/L — SIGNIFICANT CHANGE UP (ref 10–40)
AST SERPL-CCNC: 26 U/L — SIGNIFICANT CHANGE UP (ref 10–40)
AST SERPL-CCNC: 27 U/L — SIGNIFICANT CHANGE UP (ref 10–40)
AST SERPL-CCNC: 29 U/L — SIGNIFICANT CHANGE UP (ref 10–40)
AST SERPL-CCNC: 34 U/L — SIGNIFICANT CHANGE UP (ref 10–40)
AST SERPL-CCNC: 36 U/L — SIGNIFICANT CHANGE UP (ref 10–40)
BACTERIA # UR AUTO: 0 — SIGNIFICANT CHANGE UP
BACTERIA # UR AUTO: ABNORMAL
BACTERIA # UR AUTO: NEGATIVE — SIGNIFICANT CHANGE UP
BACTERIA # UR AUTO: NEGATIVE — SIGNIFICANT CHANGE UP
BASOPHILS # BLD AUTO: 0 K/UL — SIGNIFICANT CHANGE UP (ref 0–0.2)
BASOPHILS # BLD AUTO: 0.02 K/UL — SIGNIFICANT CHANGE UP (ref 0–0.2)
BASOPHILS # BLD AUTO: 0.1 K/UL — SIGNIFICANT CHANGE UP (ref 0–0.2)
BASOPHILS NFR BLD AUTO: 0 % — SIGNIFICANT CHANGE UP (ref 0–2)
BASOPHILS NFR BLD AUTO: 0.2 % — SIGNIFICANT CHANGE UP (ref 0–2)
BASOPHILS NFR BLD AUTO: 0.3 % — SIGNIFICANT CHANGE UP (ref 0–2)
BASOPHILS NFR BLD AUTO: 0.8 % — SIGNIFICANT CHANGE UP (ref 0–2)
BILIRUB SERPL-MCNC: 0.5 MG/DL — SIGNIFICANT CHANGE UP (ref 0.2–1.2)
BILIRUB SERPL-MCNC: 0.6 MG/DL — SIGNIFICANT CHANGE UP (ref 0.2–1.2)
BILIRUB SERPL-MCNC: 0.8 MG/DL — SIGNIFICANT CHANGE UP (ref 0.2–1.2)
BILIRUB SERPL-MCNC: 1.5 MG/DL — HIGH (ref 0.2–1.2)
BILIRUB UR-MCNC: NEGATIVE — SIGNIFICANT CHANGE UP
BILIRUB UR-MCNC: SIGNIFICANT CHANGE UP
BLD GP AB SCN SERPL QL: NEGATIVE — SIGNIFICANT CHANGE UP
BUN SERPL-MCNC: 15 MG/DL — SIGNIFICANT CHANGE UP (ref 7–23)
BUN SERPL-MCNC: 16 MG/DL — SIGNIFICANT CHANGE UP (ref 7–23)
BUN SERPL-MCNC: 16 MG/DL — SIGNIFICANT CHANGE UP (ref 7–23)
BUN SERPL-MCNC: 17 MG/DL — SIGNIFICANT CHANGE UP (ref 7–23)
BUN SERPL-MCNC: 17 MG/DL — SIGNIFICANT CHANGE UP (ref 7–23)
BUN SERPL-MCNC: 18 MG/DL — SIGNIFICANT CHANGE UP (ref 7–23)
BUN SERPL-MCNC: 18 MG/DL — SIGNIFICANT CHANGE UP (ref 7–23)
BUN SERPL-MCNC: 20 MG/DL — SIGNIFICANT CHANGE UP (ref 7–23)
CALCIUM SERPL-MCNC: 10.1 MG/DL — SIGNIFICANT CHANGE UP (ref 8.4–10.5)
CALCIUM SERPL-MCNC: 8.9 MG/DL — SIGNIFICANT CHANGE UP (ref 8.4–10.5)
CALCIUM SERPL-MCNC: 9.2 MG/DL — SIGNIFICANT CHANGE UP (ref 8.4–10.5)
CALCIUM SERPL-MCNC: 9.4 MG/DL — SIGNIFICANT CHANGE UP (ref 8.4–10.5)
CALCIUM SERPL-MCNC: 9.4 MG/DL — SIGNIFICANT CHANGE UP (ref 8.4–10.5)
CALCIUM SERPL-MCNC: 9.5 MG/DL — SIGNIFICANT CHANGE UP (ref 8.4–10.5)
CALCIUM SERPL-MCNC: 9.6 MG/DL — SIGNIFICANT CHANGE UP (ref 8.4–10.5)
CALCIUM SERPL-MCNC: 9.7 MG/DL — SIGNIFICANT CHANGE UP (ref 8.4–10.5)
CALCIUM SERPL-MCNC: 9.8 MG/DL — SIGNIFICANT CHANGE UP (ref 8.4–10.5)
CALCIUM SERPL-MCNC: 9.8 MG/DL — SIGNIFICANT CHANGE UP (ref 8.4–10.5)
CHLORIDE SERPL-SCNC: 100 MMOL/L — SIGNIFICANT CHANGE UP (ref 96–108)
CHLORIDE SERPL-SCNC: 101 MMOL/L — SIGNIFICANT CHANGE UP (ref 96–108)
CHLORIDE SERPL-SCNC: 102 MMOL/L — SIGNIFICANT CHANGE UP (ref 96–108)
CHLORIDE SERPL-SCNC: 102 MMOL/L — SIGNIFICANT CHANGE UP (ref 96–108)
CHLORIDE SERPL-SCNC: 103 MMOL/L — SIGNIFICANT CHANGE UP (ref 96–108)
CHLORIDE SERPL-SCNC: 93 MMOL/L — LOW (ref 96–108)
CHLORIDE SERPL-SCNC: 98 MMOL/L — SIGNIFICANT CHANGE UP (ref 96–108)
CHLORIDE SERPL-SCNC: 98 MMOL/L — SIGNIFICANT CHANGE UP (ref 96–108)
CO2 SERPL-SCNC: 22 MMOL/L — SIGNIFICANT CHANGE UP (ref 22–31)
CO2 SERPL-SCNC: 23 MMOL/L — SIGNIFICANT CHANGE UP (ref 22–31)
CO2 SERPL-SCNC: 23 MMOL/L — SIGNIFICANT CHANGE UP (ref 22–31)
CO2 SERPL-SCNC: 24 MMOL/L — SIGNIFICANT CHANGE UP (ref 22–31)
CO2 SERPL-SCNC: 24 MMOL/L — SIGNIFICANT CHANGE UP (ref 22–31)
CO2 SERPL-SCNC: 25 MMOL/L — SIGNIFICANT CHANGE UP (ref 22–31)
CO2 SERPL-SCNC: 25 MMOL/L — SIGNIFICANT CHANGE UP (ref 22–31)
CO2 SERPL-SCNC: 26 MMOL/L — SIGNIFICANT CHANGE UP (ref 22–31)
CO2 SERPL-SCNC: 27 MMOL/L — SIGNIFICANT CHANGE UP (ref 22–31)
CO2 SERPL-SCNC: 28 MMOL/L — SIGNIFICANT CHANGE UP (ref 22–31)
COLOR SPEC: ABNORMAL
COLOR SPEC: ABNORMAL
COLOR SPEC: YELLOW — SIGNIFICANT CHANGE UP
COLOR SPEC: YELLOW — SIGNIFICANT CHANGE UP
CREAT SERPL-MCNC: 0.92 MG/DL — SIGNIFICANT CHANGE UP (ref 0.5–1.3)
CREAT SERPL-MCNC: 0.93 MG/DL — SIGNIFICANT CHANGE UP (ref 0.5–1.3)
CREAT SERPL-MCNC: 0.93 MG/DL — SIGNIFICANT CHANGE UP (ref 0.5–1.3)
CREAT SERPL-MCNC: 0.94 MG/DL — SIGNIFICANT CHANGE UP (ref 0.5–1.3)
CREAT SERPL-MCNC: 0.99 MG/DL — SIGNIFICANT CHANGE UP (ref 0.5–1.3)
CREAT SERPL-MCNC: 1.01 MG/DL — SIGNIFICANT CHANGE UP (ref 0.5–1.3)
CREAT SERPL-MCNC: 1.04 MG/DL — SIGNIFICANT CHANGE UP (ref 0.5–1.3)
CREAT SERPL-MCNC: 1.04 MG/DL — SIGNIFICANT CHANGE UP (ref 0.5–1.3)
CREAT SERPL-MCNC: 1.07 MG/DL — SIGNIFICANT CHANGE UP (ref 0.5–1.3)
CREAT SERPL-MCNC: 1.07 MG/DL — SIGNIFICANT CHANGE UP (ref 0.5–1.3)
CULTURE RESULTS: NO GROWTH — SIGNIFICANT CHANGE UP
CULTURE RESULTS: NO GROWTH — SIGNIFICANT CHANGE UP
CULTURE RESULTS: SIGNIFICANT CHANGE UP
DIFF PNL FLD: ABNORMAL
DIFF PNL FLD: SIGNIFICANT CHANGE UP
ELLIPTOCYTES BLD QL SMEAR: SLIGHT — SIGNIFICANT CHANGE UP
EOSINOPHIL # BLD AUTO: 0 K/UL — SIGNIFICANT CHANGE UP (ref 0–0.5)
EOSINOPHIL # BLD AUTO: 0.01 K/UL — SIGNIFICANT CHANGE UP (ref 0–0.5)
EOSINOPHIL # BLD AUTO: 0.02 K/UL — SIGNIFICANT CHANGE UP (ref 0–0.5)
EOSINOPHIL # BLD AUTO: 0.08 K/UL — SIGNIFICANT CHANGE UP (ref 0–0.5)
EOSINOPHIL # BLD AUTO: 0.08 K/UL — SIGNIFICANT CHANGE UP (ref 0–0.5)
EOSINOPHIL # BLD AUTO: 0.3 K/UL — SIGNIFICANT CHANGE UP (ref 0–0.5)
EOSINOPHIL NFR BLD AUTO: 0 % — SIGNIFICANT CHANGE UP (ref 0–6)
EOSINOPHIL NFR BLD AUTO: 0.1 % — SIGNIFICANT CHANGE UP (ref 0–6)
EOSINOPHIL NFR BLD AUTO: 0.3 % — SIGNIFICANT CHANGE UP (ref 0–6)
EOSINOPHIL NFR BLD AUTO: 1 % — SIGNIFICANT CHANGE UP (ref 0–6)
EOSINOPHIL NFR BLD AUTO: 1 % — SIGNIFICANT CHANGE UP (ref 0–6)
EOSINOPHIL NFR BLD AUTO: 4.2 % — SIGNIFICANT CHANGE UP (ref 0–6)
EOSINOPHIL NFR BLD AUTO: 4.4 % — SIGNIFICANT CHANGE UP (ref 0–6)
EOSINOPHIL NFR BLD AUTO: 4.7 % — SIGNIFICANT CHANGE UP (ref 0–6)
EPI CELLS # UR: 0 /HPF — SIGNIFICANT CHANGE UP
EPI CELLS # UR: 1 /HPF — SIGNIFICANT CHANGE UP
EPI CELLS # UR: 1 /HPF — SIGNIFICANT CHANGE UP
EPI CELLS # UR: 1 — SIGNIFICANT CHANGE UP
FLU A RESULT: SIGNIFICANT CHANGE UP
FLU A RESULT: SIGNIFICANT CHANGE UP
FLUAV AG NPH QL: SIGNIFICANT CHANGE UP
FLUBV AG NPH QL: SIGNIFICANT CHANGE UP
GLUCOSE BLDC GLUCOMTR-MCNC: 136 MG/DL — HIGH (ref 70–99)
GLUCOSE BLDC GLUCOMTR-MCNC: 148 MG/DL — HIGH (ref 70–99)
GLUCOSE BLDC GLUCOMTR-MCNC: 158 MG/DL — HIGH (ref 70–99)
GLUCOSE BLDC GLUCOMTR-MCNC: 210 MG/DL — HIGH (ref 70–99)
GLUCOSE BLDC GLUCOMTR-MCNC: 96 MG/DL — SIGNIFICANT CHANGE UP (ref 70–99)
GLUCOSE SERPL-MCNC: 124 MG/DL — HIGH (ref 70–99)
GLUCOSE SERPL-MCNC: 127 MG/DL — HIGH (ref 70–99)
GLUCOSE SERPL-MCNC: 128 MG/DL — HIGH (ref 70–99)
GLUCOSE SERPL-MCNC: 137 MG/DL — HIGH (ref 70–99)
GLUCOSE SERPL-MCNC: 140 MG/DL — HIGH (ref 70–99)
GLUCOSE SERPL-MCNC: 147 MG/DL — HIGH (ref 70–99)
GLUCOSE SERPL-MCNC: 147 MG/DL — HIGH (ref 70–99)
GLUCOSE SERPL-MCNC: 152 MG/DL — HIGH (ref 70–99)
GLUCOSE SERPL-MCNC: 158 MG/DL — HIGH (ref 70–99)
GLUCOSE SERPL-MCNC: 170 MG/DL — HIGH (ref 70–99)
GLUCOSE UR QL: NEGATIVE — SIGNIFICANT CHANGE UP
GLUCOSE UR QL: SIGNIFICANT CHANGE UP
HBA1C BLD-MCNC: 6.3 % — HIGH (ref 4–5.6)
HCT VFR BLD CALC: 29.5 % — LOW (ref 39–50)
HCT VFR BLD CALC: 29.6 % — LOW (ref 39–50)
HCT VFR BLD CALC: 30 % — LOW (ref 39–50)
HCT VFR BLD CALC: 31.1 % — LOW (ref 39–50)
HCT VFR BLD CALC: 31.3 % — LOW (ref 39–50)
HCT VFR BLD CALC: 31.7 % — LOW (ref 39–50)
HCT VFR BLD CALC: 36.1 % — LOW (ref 39–50)
HCT VFR BLD CALC: 38.1 % — LOW (ref 39–50)
HCT VFR BLD CALC: 38.1 % — LOW (ref 39–50)
HCT VFR BLD CALC: 38.7 % — LOW (ref 39–50)
HCT VFR BLD CALC: 40.5 % — SIGNIFICANT CHANGE UP (ref 39–50)
HGB BLD-MCNC: 10 G/DL — LOW (ref 13–17)
HGB BLD-MCNC: 10 G/DL — LOW (ref 13–17)
HGB BLD-MCNC: 10.6 G/DL — LOW (ref 13–17)
HGB BLD-MCNC: 11.2 G/DL — LOW (ref 13–17)
HGB BLD-MCNC: 12.6 G/DL — LOW (ref 13–17)
HGB BLD-MCNC: 12.6 G/DL — LOW (ref 13–17)
HGB BLD-MCNC: 12.9 G/DL — LOW (ref 13–17)
HGB BLD-MCNC: 13.6 G/DL — SIGNIFICANT CHANGE UP (ref 13–17)
HGB BLD-MCNC: 9.5 G/DL — LOW (ref 13–17)
HGB BLD-MCNC: 9.6 G/DL — LOW (ref 13–17)
HGB BLD-MCNC: 9.9 G/DL — LOW (ref 13–17)
HYALINE CASTS # UR AUTO: 0 /LPF — SIGNIFICANT CHANGE UP (ref 0–2)
HYALINE CASTS # UR AUTO: 0 /LPF — SIGNIFICANT CHANGE UP (ref 0–7)
IMM GRANULOCYTES NFR BLD AUTO: 0.2 % — SIGNIFICANT CHANGE UP (ref 0–1.5)
IMM GRANULOCYTES NFR BLD AUTO: 0.3 % — SIGNIFICANT CHANGE UP (ref 0–1.5)
IMM GRANULOCYTES NFR BLD AUTO: 0.4 % — SIGNIFICANT CHANGE UP (ref 0–1.5)
IMM GRANULOCYTES NFR BLD AUTO: 0.5 % — SIGNIFICANT CHANGE UP (ref 0–1.5)
INR BLD: 1.19 RATIO — HIGH (ref 0.88–1.16)
INR BLD: 1.31 RATIO — HIGH (ref 0.88–1.16)
INR BLD: 1.66 RATIO — HIGH (ref 0.88–1.16)
INR BLD: 1.7 RATIO — HIGH (ref 0.88–1.16)
INR BLD: 2.16 RATIO — HIGH (ref 0.88–1.16)
INR BLD: 2.28 RATIO — HIGH (ref 0.88–1.16)
INR BLD: 2.56 RATIO — HIGH (ref 0.88–1.16)
INR BLD: 2.86 RATIO — HIGH (ref 0.88–1.16)
INR BLD: 3.29 RATIO — HIGH (ref 0.88–1.16)
KETONES UR-MCNC: NEGATIVE — SIGNIFICANT CHANGE UP
KETONES UR-MCNC: SIGNIFICANT CHANGE UP
LEUKOCYTE ESTERASE UR-ACNC: ABNORMAL
LEUKOCYTE ESTERASE UR-ACNC: NEGATIVE — SIGNIFICANT CHANGE UP
LEUKOCYTE ESTERASE UR-ACNC: NEGATIVE — SIGNIFICANT CHANGE UP
LEUKOCYTE ESTERASE UR-ACNC: SIGNIFICANT CHANGE UP
LYMPHOCYTES # BLD AUTO: 0.49 K/UL — LOW (ref 1–3.3)
LYMPHOCYTES # BLD AUTO: 0.56 K/UL — LOW (ref 1–3.3)
LYMPHOCYTES # BLD AUTO: 0.58 K/UL — LOW (ref 1–3.3)
LYMPHOCYTES # BLD AUTO: 0.67 K/UL — LOW (ref 1–3.3)
LYMPHOCYTES # BLD AUTO: 0.7 K/UL — LOW (ref 1–3.3)
LYMPHOCYTES # BLD AUTO: 0.93 K/UL — LOW (ref 1–3.3)
LYMPHOCYTES # BLD AUTO: 1 K/UL — SIGNIFICANT CHANGE UP (ref 1–3.3)
LYMPHOCYTES # BLD AUTO: 1.1 K/UL — SIGNIFICANT CHANGE UP (ref 1–3.3)
LYMPHOCYTES # BLD AUTO: 12 % — LOW (ref 13–44)
LYMPHOCYTES # BLD AUTO: 13.8 % — SIGNIFICANT CHANGE UP (ref 13–44)
LYMPHOCYTES # BLD AUTO: 15 % — SIGNIFICANT CHANGE UP (ref 13–44)
LYMPHOCYTES # BLD AUTO: 4.9 % — LOW (ref 13–44)
LYMPHOCYTES # BLD AUTO: 5.3 % — LOW (ref 13–44)
LYMPHOCYTES # BLD AUTO: 8 % — LOW (ref 13–44)
LYMPHOCYTES # BLD AUTO: 8.4 % — LOW (ref 13–44)
LYMPHOCYTES # BLD AUTO: 9.3 % — LOW (ref 13–44)
MACROCYTES BLD QL: SLIGHT — SIGNIFICANT CHANGE UP
MAGNESIUM SERPL-MCNC: 1.9 MG/DL — SIGNIFICANT CHANGE UP (ref 1.6–2.6)
MANUAL SMEAR VERIFICATION: SIGNIFICANT CHANGE UP
MANUAL SMEAR VERIFICATION: SIGNIFICANT CHANGE UP
MCHC RBC-ENTMCNC: 30.7 PG — SIGNIFICANT CHANGE UP (ref 27–34)
MCHC RBC-ENTMCNC: 30.7 PG — SIGNIFICANT CHANGE UP (ref 27–34)
MCHC RBC-ENTMCNC: 31 GM/DL — LOW (ref 32–36)
MCHC RBC-ENTMCNC: 31 PG — SIGNIFICANT CHANGE UP (ref 27–34)
MCHC RBC-ENTMCNC: 31.1 PG — SIGNIFICANT CHANGE UP (ref 27–34)
MCHC RBC-ENTMCNC: 31.3 PG — SIGNIFICANT CHANGE UP (ref 27–34)
MCHC RBC-ENTMCNC: 31.6 GM/DL — LOW (ref 32–36)
MCHC RBC-ENTMCNC: 31.6 PG — SIGNIFICANT CHANGE UP (ref 27–34)
MCHC RBC-ENTMCNC: 31.8 PG — SIGNIFICANT CHANGE UP (ref 27–34)
MCHC RBC-ENTMCNC: 31.9 PG — SIGNIFICANT CHANGE UP (ref 27–34)
MCHC RBC-ENTMCNC: 32 GM/DL — SIGNIFICANT CHANGE UP (ref 32–36)
MCHC RBC-ENTMCNC: 32.2 GM/DL — SIGNIFICANT CHANGE UP (ref 32–36)
MCHC RBC-ENTMCNC: 32.2 GM/DL — SIGNIFICANT CHANGE UP (ref 32–36)
MCHC RBC-ENTMCNC: 32.5 PG — SIGNIFICANT CHANGE UP (ref 27–34)
MCHC RBC-ENTMCNC: 32.6 GM/DL — SIGNIFICANT CHANGE UP (ref 32–36)
MCHC RBC-ENTMCNC: 33 GM/DL — SIGNIFICANT CHANGE UP (ref 32–36)
MCHC RBC-ENTMCNC: 33.4 GM/DL — SIGNIFICANT CHANGE UP (ref 32–36)
MCHC RBC-ENTMCNC: 33.6 GM/DL — SIGNIFICANT CHANGE UP (ref 32–36)
MCHC RBC-ENTMCNC: 33.8 GM/DL — SIGNIFICANT CHANGE UP (ref 32–36)
MCHC RBC-ENTMCNC: 34 GM/DL — SIGNIFICANT CHANGE UP (ref 32–36)
MCV RBC AUTO: 100 FL — SIGNIFICANT CHANGE UP (ref 80–100)
MCV RBC AUTO: 94.2 FL — SIGNIFICANT CHANGE UP (ref 80–100)
MCV RBC AUTO: 94.4 FL — SIGNIFICANT CHANGE UP (ref 80–100)
MCV RBC AUTO: 94.6 FL — SIGNIFICANT CHANGE UP (ref 80–100)
MCV RBC AUTO: 94.6 FL — SIGNIFICANT CHANGE UP (ref 80–100)
MCV RBC AUTO: 95.6 FL — SIGNIFICANT CHANGE UP (ref 80–100)
MCV RBC AUTO: 95.8 FL — SIGNIFICANT CHANGE UP (ref 80–100)
MCV RBC AUTO: 96.4 FL — SIGNIFICANT CHANGE UP (ref 80–100)
MCV RBC AUTO: 97.5 FL — SIGNIFICANT CHANGE UP (ref 80–100)
MCV RBC AUTO: 98 FL — SIGNIFICANT CHANGE UP (ref 80–100)
MCV RBC AUTO: 98.4 FL — SIGNIFICANT CHANGE UP (ref 80–100)
MICROCYTES BLD QL: SLIGHT — SIGNIFICANT CHANGE UP
MONOCYTES # BLD AUTO: 0.8 K/UL — SIGNIFICANT CHANGE UP (ref 0–0.9)
MONOCYTES # BLD AUTO: 0.9 K/UL — SIGNIFICANT CHANGE UP (ref 0–0.9)
MONOCYTES # BLD AUTO: 0.9 K/UL — SIGNIFICANT CHANGE UP (ref 0–0.9)
MONOCYTES # BLD AUTO: 0.92 K/UL — HIGH (ref 0–0.9)
MONOCYTES # BLD AUTO: 1.16 K/UL — HIGH (ref 0–0.9)
MONOCYTES # BLD AUTO: 1.16 K/UL — HIGH (ref 0–0.9)
MONOCYTES # BLD AUTO: 1.32 K/UL — HIGH (ref 0–0.9)
MONOCYTES # BLD AUTO: 1.4 K/UL — HIGH (ref 0–0.9)
MONOCYTES NFR BLD AUTO: 11.6 % — SIGNIFICANT CHANGE UP (ref 2–14)
MONOCYTES NFR BLD AUTO: 11.8 % — SIGNIFICANT CHANGE UP (ref 2–14)
MONOCYTES NFR BLD AUTO: 11.8 % — SIGNIFICANT CHANGE UP (ref 2–14)
MONOCYTES NFR BLD AUTO: 12.3 % — SIGNIFICANT CHANGE UP (ref 2–14)
MONOCYTES NFR BLD AUTO: 12.4 % — SIGNIFICANT CHANGE UP (ref 2–14)
MONOCYTES NFR BLD AUTO: 12.7 % — SIGNIFICANT CHANGE UP (ref 2–14)
MONOCYTES NFR BLD AUTO: 15 % — HIGH (ref 2–14)
MONOCYTES NFR BLD AUTO: 18.9 % — HIGH (ref 2–14)
MYELOCYTES NFR BLD: 1 % — HIGH (ref 0–0)
NEUTROPHILS # BLD AUTO: 4.45 K/UL — SIGNIFICANT CHANGE UP (ref 1.8–7.4)
NEUTROPHILS # BLD AUTO: 5 K/UL — SIGNIFICANT CHANGE UP (ref 1.8–7.4)
NEUTROPHILS # BLD AUTO: 5 K/UL — SIGNIFICANT CHANGE UP (ref 1.8–7.4)
NEUTROPHILS # BLD AUTO: 5.2 K/UL — SIGNIFICANT CHANGE UP (ref 1.8–7.4)
NEUTROPHILS # BLD AUTO: 5.5 K/UL — SIGNIFICANT CHANGE UP (ref 1.8–7.4)
NEUTROPHILS # BLD AUTO: 6.22 K/UL — SIGNIFICANT CHANGE UP (ref 1.8–7.4)
NEUTROPHILS # BLD AUTO: 8.69 K/UL — HIGH (ref 1.8–7.4)
NEUTROPHILS # BLD AUTO: 9.84 K/UL — HIGH (ref 1.8–7.4)
NEUTROPHILS NFR BLD AUTO: 68.1 % — SIGNIFICANT CHANGE UP (ref 43–77)
NEUTROPHILS NFR BLD AUTO: 69.3 % — SIGNIFICANT CHANGE UP (ref 43–77)
NEUTROPHILS NFR BLD AUTO: 71 % — SIGNIFICANT CHANGE UP (ref 43–77)
NEUTROPHILS NFR BLD AUTO: 72.3 % — SIGNIFICANT CHANGE UP (ref 43–77)
NEUTROPHILS NFR BLD AUTO: 73 % — SIGNIFICANT CHANGE UP (ref 43–77)
NEUTROPHILS NFR BLD AUTO: 78.3 % — HIGH (ref 43–77)
NEUTROPHILS NFR BLD AUTO: 81.7 % — HIGH (ref 43–77)
NEUTROPHILS NFR BLD AUTO: 82.6 % — HIGH (ref 43–77)
NITRITE UR-MCNC: NEGATIVE — SIGNIFICANT CHANGE UP
NITRITE UR-MCNC: SIGNIFICANT CHANGE UP
NRBC # BLD: 0 /100 WBCS — SIGNIFICANT CHANGE UP (ref 0–0)
NRBC # BLD: 0 /100 — SIGNIFICANT CHANGE UP (ref 0–0)
PH UR: 6.5 — SIGNIFICANT CHANGE UP (ref 5–8)
PH UR: 6.5 — SIGNIFICANT CHANGE UP (ref 5–8)
PH UR: 7 — SIGNIFICANT CHANGE UP (ref 5–8)
PH UR: SIGNIFICANT CHANGE UP (ref 5–8)
PLAT MORPH BLD: NORMAL — SIGNIFICANT CHANGE UP
PLAT MORPH BLD: NORMAL — SIGNIFICANT CHANGE UP
PLATELET # BLD AUTO: 104 K/UL — LOW (ref 150–400)
PLATELET # BLD AUTO: 105 K/UL — LOW (ref 150–400)
PLATELET # BLD AUTO: 115 K/UL — LOW (ref 150–400)
PLATELET # BLD AUTO: 120 K/UL — LOW (ref 150–400)
PLATELET # BLD AUTO: 127 K/UL — LOW (ref 150–400)
PLATELET # BLD AUTO: 131 K/UL — LOW (ref 150–400)
PLATELET # BLD AUTO: 140 K/UL — LOW (ref 150–400)
PLATELET # BLD AUTO: 145 K/UL — LOW (ref 150–400)
PLATELET # BLD AUTO: 193 K/UL — SIGNIFICANT CHANGE UP (ref 150–400)
PLATELET # BLD AUTO: 195 K/UL — SIGNIFICANT CHANGE UP (ref 150–400)
PLATELET # BLD AUTO: 98 K/UL — LOW (ref 150–400)
POIKILOCYTOSIS BLD QL AUTO: SLIGHT — SIGNIFICANT CHANGE UP
POTASSIUM SERPL-MCNC: 3.8 MMOL/L — SIGNIFICANT CHANGE UP (ref 3.5–5.3)
POTASSIUM SERPL-MCNC: 4 MMOL/L — SIGNIFICANT CHANGE UP (ref 3.5–5.3)
POTASSIUM SERPL-MCNC: 4.2 MMOL/L — SIGNIFICANT CHANGE UP (ref 3.5–5.3)
POTASSIUM SERPL-MCNC: 4.2 MMOL/L — SIGNIFICANT CHANGE UP (ref 3.5–5.3)
POTASSIUM SERPL-MCNC: 4.3 MMOL/L — SIGNIFICANT CHANGE UP (ref 3.5–5.3)
POTASSIUM SERPL-MCNC: 4.5 MMOL/L — SIGNIFICANT CHANGE UP (ref 3.5–5.3)
POTASSIUM SERPL-MCNC: 4.6 MMOL/L — SIGNIFICANT CHANGE UP (ref 3.5–5.3)
POTASSIUM SERPL-MCNC: 4.7 MMOL/L — SIGNIFICANT CHANGE UP (ref 3.5–5.3)
POTASSIUM SERPL-MCNC: 4.8 MMOL/L — SIGNIFICANT CHANGE UP (ref 3.5–5.3)
POTASSIUM SERPL-MCNC: 5 MMOL/L — SIGNIFICANT CHANGE UP (ref 3.5–5.3)
POTASSIUM SERPL-SCNC: 3.8 MMOL/L — SIGNIFICANT CHANGE UP (ref 3.5–5.3)
POTASSIUM SERPL-SCNC: 4 MMOL/L — SIGNIFICANT CHANGE UP (ref 3.5–5.3)
POTASSIUM SERPL-SCNC: 4.2 MMOL/L — SIGNIFICANT CHANGE UP (ref 3.5–5.3)
POTASSIUM SERPL-SCNC: 4.2 MMOL/L — SIGNIFICANT CHANGE UP (ref 3.5–5.3)
POTASSIUM SERPL-SCNC: 4.3 MMOL/L — SIGNIFICANT CHANGE UP (ref 3.5–5.3)
POTASSIUM SERPL-SCNC: 4.5 MMOL/L — SIGNIFICANT CHANGE UP (ref 3.5–5.3)
POTASSIUM SERPL-SCNC: 4.6 MMOL/L — SIGNIFICANT CHANGE UP (ref 3.5–5.3)
POTASSIUM SERPL-SCNC: 4.7 MMOL/L — SIGNIFICANT CHANGE UP (ref 3.5–5.3)
POTASSIUM SERPL-SCNC: 4.8 MMOL/L — SIGNIFICANT CHANGE UP (ref 3.5–5.3)
POTASSIUM SERPL-SCNC: 5 MMOL/L — SIGNIFICANT CHANGE UP (ref 3.5–5.3)
PROT SERPL-MCNC: 6.2 G/DL — SIGNIFICANT CHANGE UP (ref 6–8.3)
PROT SERPL-MCNC: 6.7 G/DL — SIGNIFICANT CHANGE UP (ref 6–8.3)
PROT SERPL-MCNC: 6.8 G/DL — SIGNIFICANT CHANGE UP (ref 6–8.3)
PROT SERPL-MCNC: 7.1 G/DL — SIGNIFICANT CHANGE UP (ref 6–8.3)
PROT SERPL-MCNC: 7.2 G/DL — SIGNIFICANT CHANGE UP (ref 6–8.3)
PROT SERPL-MCNC: 7.4 G/DL — SIGNIFICANT CHANGE UP (ref 6–8.3)
PROT SERPL-MCNC: 7.5 G/DL — SIGNIFICANT CHANGE UP (ref 6–8.3)
PROT UR-MCNC: ABNORMAL
PROT UR-MCNC: SIGNIFICANT CHANGE UP
PROTHROM AB SERPL-ACNC: 13.7 SEC — HIGH (ref 10–12.9)
PROTHROM AB SERPL-ACNC: 15.1 SEC — HIGH (ref 10–12.9)
PROTHROM AB SERPL-ACNC: 19.4 SEC — HIGH (ref 10–13.1)
PROTHROM AB SERPL-ACNC: 19.9 SEC — HIGH (ref 10–12.9)
PROTHROM AB SERPL-ACNC: 25.4 SEC — HIGH (ref 10–12.9)
PROTHROM AB SERPL-ACNC: 26.7 SEC — HIGH (ref 10–12.9)
PROTHROM AB SERPL-ACNC: 30.1 SEC — HIGH (ref 10–12.9)
PROTHROM AB SERPL-ACNC: 33.7 SEC — HIGH (ref 10–12.9)
PROTHROM AB SERPL-ACNC: 39.2 SEC — HIGH (ref 10–12.9)
RBC # BLD: 3.01 M/UL — LOW (ref 4.2–5.8)
RBC # BLD: 3.13 M/UL — LOW (ref 4.2–5.8)
RBC # BLD: 3.13 M/UL — LOW (ref 4.2–5.8)
RBC # BLD: 3.18 M/UL — LOW (ref 4.2–5.8)
RBC # BLD: 3.19 M/UL — LOW (ref 4.2–5.8)
RBC # BLD: 3.35 M/UL — LOW (ref 4.2–5.8)
RBC # BLD: 3.61 M/UL — LOW (ref 4.2–5.8)
RBC # BLD: 3.95 M/UL — LOW (ref 4.2–5.8)
RBC # BLD: 3.98 M/UL — LOW (ref 4.2–5.8)
RBC # BLD: 4.1 M/UL — LOW (ref 4.2–5.8)
RBC # BLD: 4.3 M/UL — SIGNIFICANT CHANGE UP (ref 4.2–5.8)
RBC # FLD: 12 % — SIGNIFICANT CHANGE UP (ref 10.3–14.5)
RBC # FLD: 12.4 % — SIGNIFICANT CHANGE UP (ref 10.3–14.5)
RBC # FLD: 12.7 % — SIGNIFICANT CHANGE UP (ref 10.3–14.5)
RBC # FLD: 14.2 % — SIGNIFICANT CHANGE UP (ref 10.3–14.5)
RBC # FLD: 14.6 % — HIGH (ref 10.3–14.5)
RBC # FLD: 14.6 % — HIGH (ref 10.3–14.5)
RBC # FLD: 14.7 % — HIGH (ref 10.3–14.5)
RBC # FLD: 14.8 % — HIGH (ref 10.3–14.5)
RBC # FLD: 15.1 % — HIGH (ref 10.3–14.5)
RBC # FLD: 15.2 % — HIGH (ref 10.3–14.5)
RBC # FLD: 15.2 % — HIGH (ref 10.3–14.5)
RBC BLD AUTO: ABNORMAL
RBC BLD AUTO: NORMAL — SIGNIFICANT CHANGE UP
RBC CASTS # UR COMP ASSIST: 213 /HPF — HIGH (ref 0–4)
RBC CASTS # UR COMP ASSIST: 88 /HPF — HIGH (ref 0–4)
RBC CASTS # UR COMP ASSIST: >50 /HPF — HIGH (ref 0–4)
RBC CASTS # UR COMP ASSIST: >50 /HPF — SIGNIFICANT CHANGE UP (ref 0–4)
RH IG SCN BLD-IMP: POSITIVE — SIGNIFICANT CHANGE UP
RSV RESULT: SIGNIFICANT CHANGE UP
RSV RNA RESP QL NAA+PROBE: SIGNIFICANT CHANGE UP
SODIUM SERPL-SCNC: 133 MMOL/L — LOW (ref 135–145)
SODIUM SERPL-SCNC: 135 MMOL/L — SIGNIFICANT CHANGE UP (ref 135–145)
SODIUM SERPL-SCNC: 135 MMOL/L — SIGNIFICANT CHANGE UP (ref 135–145)
SODIUM SERPL-SCNC: 136 MMOL/L — SIGNIFICANT CHANGE UP (ref 135–145)
SODIUM SERPL-SCNC: 137 MMOL/L — SIGNIFICANT CHANGE UP (ref 135–145)
SODIUM SERPL-SCNC: 139 MMOL/L — SIGNIFICANT CHANGE UP (ref 135–145)
SODIUM SERPL-SCNC: 140 MMOL/L — SIGNIFICANT CHANGE UP (ref 135–145)
SODIUM SERPL-SCNC: 140 MMOL/L — SIGNIFICANT CHANGE UP (ref 135–145)
SP GR SPEC: 1.01 — SIGNIFICANT CHANGE UP (ref 1.01–1.02)
SP GR SPEC: 1.01 — SIGNIFICANT CHANGE UP (ref 1.01–1.02)
SP GR SPEC: 1.02 — SIGNIFICANT CHANGE UP (ref 1.01–1.02)
SP GR SPEC: 1.02 — SIGNIFICANT CHANGE UP (ref 1.01–1.02)
SPECIMEN SOURCE: SIGNIFICANT CHANGE UP
SURGICAL PATHOLOGY STUDY: SIGNIFICANT CHANGE UP
TROPONIN T, HIGH SENSITIVITY RESULT: 24 NG/L — SIGNIFICANT CHANGE UP (ref 0–51)
TROPONIN T, HIGH SENSITIVITY RESULT: 25 NG/L — SIGNIFICANT CHANGE UP (ref 0–51)
TROPONIN T, HIGH SENSITIVITY RESULT: 35 NG/L — SIGNIFICANT CHANGE UP (ref 0–51)
UROBILINOGEN FLD QL: NEGATIVE — SIGNIFICANT CHANGE UP
UROBILINOGEN FLD QL: SIGNIFICANT CHANGE UP
WBC # BLD: 10.63 K/UL — HIGH (ref 3.8–10.5)
WBC # BLD: 11.9 K/UL — HIGH (ref 3.8–10.5)
WBC # BLD: 6.15 K/UL — SIGNIFICANT CHANGE UP (ref 3.8–10.5)
WBC # BLD: 6.6 K/UL — SIGNIFICANT CHANGE UP (ref 3.8–10.5)
WBC # BLD: 7.2 K/UL — SIGNIFICANT CHANGE UP (ref 3.8–10.5)
WBC # BLD: 7.2 K/UL — SIGNIFICANT CHANGE UP (ref 3.8–10.5)
WBC # BLD: 7.4 K/UL — SIGNIFICANT CHANGE UP (ref 3.8–10.5)
WBC # BLD: 7.66 K/UL — SIGNIFICANT CHANGE UP (ref 3.8–10.5)
WBC # BLD: 7.75 K/UL — SIGNIFICANT CHANGE UP (ref 3.8–10.5)
WBC # BLD: 7.94 K/UL — SIGNIFICANT CHANGE UP (ref 3.8–10.5)
WBC # BLD: 8.24 K/UL — SIGNIFICANT CHANGE UP (ref 3.8–10.5)
WBC # FLD AUTO: 10.63 K/UL — HIGH (ref 3.8–10.5)
WBC # FLD AUTO: 11.9 K/UL — HIGH (ref 3.8–10.5)
WBC # FLD AUTO: 6.15 K/UL — SIGNIFICANT CHANGE UP (ref 3.8–10.5)
WBC # FLD AUTO: 6.6 K/UL — SIGNIFICANT CHANGE UP (ref 3.8–10.5)
WBC # FLD AUTO: 7.2 K/UL — SIGNIFICANT CHANGE UP (ref 3.8–10.5)
WBC # FLD AUTO: 7.2 K/UL — SIGNIFICANT CHANGE UP (ref 3.8–10.5)
WBC # FLD AUTO: 7.4 K/UL — SIGNIFICANT CHANGE UP (ref 3.8–10.5)
WBC # FLD AUTO: 7.66 K/UL — SIGNIFICANT CHANGE UP (ref 3.8–10.5)
WBC # FLD AUTO: 7.75 K/UL — SIGNIFICANT CHANGE UP (ref 3.8–10.5)
WBC # FLD AUTO: 7.94 K/UL — SIGNIFICANT CHANGE UP (ref 3.8–10.5)
WBC # FLD AUTO: 8.24 K/UL — SIGNIFICANT CHANGE UP (ref 3.8–10.5)
WBC UR QL: 10 /HPF — HIGH (ref 0–5)
WBC UR QL: 12 /HPF — HIGH (ref 0–5)
WBC UR QL: 8 /HPF — HIGH (ref 0–5)
WBC UR QL: ABNORMAL

## 2019-01-01 PROCEDURE — 99215 OFFICE O/P EST HI 40 MIN: CPT

## 2019-01-01 PROCEDURE — 70496 CT ANGIOGRAPHY HEAD: CPT | Mod: 26

## 2019-01-01 PROCEDURE — 93010 ELECTROCARDIOGRAM REPORT: CPT

## 2019-01-01 PROCEDURE — 93296 REM INTERROG EVL PM/IDS: CPT

## 2019-01-01 PROCEDURE — 81001 URINALYSIS AUTO W/SCOPE: CPT

## 2019-01-01 PROCEDURE — 85027 COMPLETE CBC AUTOMATED: CPT

## 2019-01-01 PROCEDURE — 96374 THER/PROPH/DIAG INJ IV PUSH: CPT | Mod: XU

## 2019-01-01 PROCEDURE — 93000 ELECTROCARDIOGRAM COMPLETE: CPT | Mod: 59

## 2019-01-01 PROCEDURE — 12345: CPT | Mod: NC

## 2019-01-01 PROCEDURE — 70498 CT ANGIOGRAPHY NECK: CPT | Mod: 26

## 2019-01-01 PROCEDURE — 93000 ELECTROCARDIOGRAM COMPLETE: CPT

## 2019-01-01 PROCEDURE — 99285 EMERGENCY DEPT VISIT HI MDM: CPT | Mod: 25

## 2019-01-01 PROCEDURE — 83036 HEMOGLOBIN GLYCOSYLATED A1C: CPT

## 2019-01-01 PROCEDURE — 85610 PROTHROMBIN TIME: CPT

## 2019-01-01 PROCEDURE — 99214 OFFICE O/P EST MOD 30 MIN: CPT

## 2019-01-01 PROCEDURE — 93005 ELECTROCARDIOGRAM TRACING: CPT

## 2019-01-01 PROCEDURE — G0463: CPT

## 2019-01-01 PROCEDURE — 85730 THROMBOPLASTIN TIME PARTIAL: CPT

## 2019-01-01 PROCEDURE — 93294 REM INTERROG EVL PM/LDLS PM: CPT

## 2019-01-01 PROCEDURE — 99285 EMERGENCY DEPT VISIT HI MDM: CPT

## 2019-01-01 PROCEDURE — 80053 COMPREHEN METABOLIC PANEL: CPT

## 2019-01-01 PROCEDURE — 70498 CT ANGIOGRAPHY NECK: CPT

## 2019-01-01 PROCEDURE — 51702 INSERT TEMP BLADDER CATH: CPT

## 2019-01-01 PROCEDURE — 99213 OFFICE O/P EST LOW 20 MIN: CPT

## 2019-01-01 PROCEDURE — 82962 GLUCOSE BLOOD TEST: CPT

## 2019-01-01 PROCEDURE — 99284 EMERGENCY DEPT VISIT MOD MDM: CPT

## 2019-01-01 PROCEDURE — 52234 CYSTOSCOPY AND TREATMENT: CPT

## 2019-01-01 PROCEDURE — 83735 ASSAY OF MAGNESIUM: CPT

## 2019-01-01 PROCEDURE — 74176 CT ABD & PELVIS W/O CONTRAST: CPT | Mod: 26

## 2019-01-01 PROCEDURE — 99284 EMERGENCY DEPT VISIT MOD MDM: CPT | Mod: 25

## 2019-01-01 PROCEDURE — 99283 EMERGENCY DEPT VISIT LOW MDM: CPT | Mod: 25

## 2019-01-01 PROCEDURE — 70450 CT HEAD/BRAIN W/O DYE: CPT | Mod: 26,59

## 2019-01-01 PROCEDURE — 71045 X-RAY EXAM CHEST 1 VIEW: CPT | Mod: 26

## 2019-01-01 PROCEDURE — 88305 TISSUE EXAM BY PATHOLOGIST: CPT | Mod: 26

## 2019-01-01 PROCEDURE — 87086 URINE CULTURE/COLONY COUNT: CPT

## 2019-01-01 PROCEDURE — 86850 RBC ANTIBODY SCREEN: CPT

## 2019-01-01 PROCEDURE — 52000 CYSTOURETHROSCOPY: CPT

## 2019-01-01 PROCEDURE — 80048 BASIC METABOLIC PNL TOTAL CA: CPT

## 2019-01-01 PROCEDURE — 93280 PM DEVICE PROGR EVAL DUAL: CPT

## 2019-01-01 PROCEDURE — 70450 CT HEAD/BRAIN W/O DYE: CPT

## 2019-01-01 PROCEDURE — 71046 X-RAY EXAM CHEST 2 VIEWS: CPT | Mod: 26

## 2019-01-01 PROCEDURE — 71045 X-RAY EXAM CHEST 1 VIEW: CPT

## 2019-01-01 PROCEDURE — 86900 BLOOD TYPING SEROLOGIC ABO: CPT

## 2019-01-01 PROCEDURE — 84484 ASSAY OF TROPONIN QUANT: CPT

## 2019-01-01 PROCEDURE — 88305 TISSUE EXAM BY PATHOLOGIST: CPT

## 2019-01-01 PROCEDURE — 99223 1ST HOSP IP/OBS HIGH 75: CPT

## 2019-01-01 PROCEDURE — 86901 BLOOD TYPING SEROLOGIC RH(D): CPT

## 2019-01-01 PROCEDURE — 96374 THER/PROPH/DIAG INJ IV PUSH: CPT

## 2019-01-01 PROCEDURE — 70496 CT ANGIOGRAPHY HEAD: CPT

## 2019-01-01 PROCEDURE — 99222 1ST HOSP IP/OBS MODERATE 55: CPT

## 2019-01-01 PROCEDURE — 74176 CT ABD & PELVIS W/O CONTRAST: CPT

## 2019-01-01 RX ORDER — LEVALBUTEROL 1.25 MG/.5ML
0.63 SOLUTION, CONCENTRATE RESPIRATORY (INHALATION) EVERY 6 HOURS
Refills: 0 | Status: DISCONTINUED | OUTPATIENT
Start: 2019-01-01 | End: 2020-01-01

## 2019-01-01 RX ORDER — METOPROLOL SUCCINATE 50 MG/1
50 TABLET, EXTENDED RELEASE ORAL
Refills: 0 | Status: DISCONTINUED | COMMUNITY
End: 2019-01-01

## 2019-01-01 RX ORDER — CEPHALEXIN 500 MG
1 CAPSULE ORAL
Qty: 6 | Refills: 0
Start: 2019-01-01 | End: 2019-01-01

## 2019-01-01 RX ORDER — ATORVASTATIN CALCIUM 80 MG/1
10 TABLET, FILM COATED ORAL AT BEDTIME
Refills: 0 | Status: DISCONTINUED | OUTPATIENT
Start: 2019-01-01 | End: 2020-01-01

## 2019-01-01 RX ORDER — IPRATROPIUM/ALBUTEROL SULFATE 18-103MCG
3 AEROSOL WITH ADAPTER (GRAM) INHALATION ONCE
Refills: 0 | Status: COMPLETED | OUTPATIENT
Start: 2019-01-01 | End: 2019-01-01

## 2019-01-01 RX ORDER — SODIUM CHLORIDE 9 MG/ML
1000 INJECTION, SOLUTION INTRAVENOUS
Refills: 0 | Status: DISCONTINUED | OUTPATIENT
Start: 2019-01-01 | End: 2019-01-01

## 2019-01-01 RX ORDER — ONDANSETRON 8 MG/1
4 TABLET, FILM COATED ORAL ONCE
Refills: 0 | Status: DISCONTINUED | OUTPATIENT
Start: 2019-01-01 | End: 2019-01-01

## 2019-01-01 RX ORDER — TAMSULOSIN HYDROCHLORIDE 0.4 MG/1
2 CAPSULE ORAL
Qty: 0 | Refills: 0 | DISCHARGE

## 2019-01-01 RX ORDER — DIGOXIN 125 UG/1
125 TABLET ORAL
Qty: 45 | Refills: 3 | Status: ACTIVE | COMMUNITY
Start: 2018-08-13 | End: 1900-01-01

## 2019-01-01 RX ORDER — FERROUS SULFATE 325(65) MG
325 TABLET ORAL DAILY
Refills: 0 | Status: DISCONTINUED | OUTPATIENT
Start: 2019-01-01 | End: 2019-01-01

## 2019-01-01 RX ORDER — ACETAMINOPHEN 500 MG
650 TABLET ORAL EVERY 4 HOURS
Refills: 0 | Status: DISCONTINUED | OUTPATIENT
Start: 2019-01-01 | End: 2020-01-01

## 2019-01-01 RX ORDER — FUROSEMIDE 40 MG
40 TABLET ORAL DAILY
Refills: 0 | Status: DISCONTINUED | OUTPATIENT
Start: 2019-01-01 | End: 2019-01-01

## 2019-01-01 RX ORDER — HYDROMORPHONE HYDROCHLORIDE 2 MG/ML
0.25 INJECTION INTRAMUSCULAR; INTRAVENOUS; SUBCUTANEOUS
Refills: 0 | Status: DISCONTINUED | OUTPATIENT
Start: 2019-01-01 | End: 2019-01-01

## 2019-01-01 RX ORDER — HEPARIN SODIUM 5000 [USP'U]/ML
5000 INJECTION INTRAVENOUS; SUBCUTANEOUS EVERY 12 HOURS
Refills: 0 | Status: DISCONTINUED | OUTPATIENT
Start: 2019-01-01 | End: 2019-01-01

## 2019-01-01 RX ORDER — WARFARIN SODIUM 2.5 MG/1
5 TABLET ORAL ONCE
Refills: 0 | Status: COMPLETED | OUTPATIENT
Start: 2019-01-01 | End: 2019-01-01

## 2019-01-01 RX ORDER — CEFAZOLIN SODIUM 1 G
VIAL (EA) INJECTION
Refills: 0 | Status: DISCONTINUED | OUTPATIENT
Start: 2019-01-01 | End: 2019-01-01

## 2019-01-01 RX ORDER — FUROSEMIDE 40 MG
20 TABLET ORAL EVERY 12 HOURS
Refills: 0 | Status: DISCONTINUED | OUTPATIENT
Start: 2019-01-01 | End: 2020-01-01

## 2019-01-01 RX ORDER — CEFAZOLIN SODIUM 1 G
1000 VIAL (EA) INJECTION EVERY 8 HOURS
Refills: 0 | Status: DISCONTINUED | OUTPATIENT
Start: 2019-01-01 | End: 2019-01-01

## 2019-01-01 RX ORDER — CEFTRIAXONE 500 MG/1
1000 INJECTION, POWDER, FOR SOLUTION INTRAMUSCULAR; INTRAVENOUS ONCE
Refills: 0 | Status: COMPLETED | OUTPATIENT
Start: 2019-01-01 | End: 2019-01-01

## 2019-01-01 RX ORDER — ALBUTEROL 90 UG/1
2.5 AEROSOL, METERED ORAL ONCE
Refills: 0 | Status: COMPLETED | OUTPATIENT
Start: 2019-01-01 | End: 2019-01-01

## 2019-01-01 RX ORDER — CEFAZOLIN SODIUM 1 G
1000 VIAL (EA) INJECTION ONCE
Refills: 0 | Status: COMPLETED | OUTPATIENT
Start: 2019-01-01 | End: 2019-01-01

## 2019-01-01 RX ORDER — ATORVASTATIN CALCIUM 80 MG/1
1 TABLET, FILM COATED ORAL
Qty: 0 | Refills: 0 | DISCHARGE

## 2019-01-01 RX ORDER — ALBUTEROL 90 UG/1
2.5 AEROSOL, METERED ORAL EVERY 4 HOURS
Refills: 0 | Status: DISCONTINUED | OUTPATIENT
Start: 2019-01-01 | End: 2019-01-01

## 2019-01-01 RX ORDER — DIGOXIN 250 MCG
1 TABLET ORAL
Qty: 0 | Refills: 0 | DISCHARGE

## 2019-01-01 RX ORDER — INSULIN LISPRO 100/ML
VIAL (ML) SUBCUTANEOUS EVERY 6 HOURS
Refills: 0 | Status: DISCONTINUED | OUTPATIENT
Start: 2019-01-01 | End: 2019-01-01

## 2019-01-01 RX ORDER — CEFTRIAXONE 500 MG/1
1000 INJECTION, POWDER, FOR SOLUTION INTRAMUSCULAR; INTRAVENOUS ONCE
Refills: 0 | Status: DISCONTINUED | OUTPATIENT
Start: 2019-01-01 | End: 2019-01-01

## 2019-01-01 RX ORDER — DIGOXIN 250 MCG
0.12 TABLET ORAL EVERY OTHER DAY
Refills: 0 | Status: DISCONTINUED | OUTPATIENT
Start: 2019-01-01 | End: 2020-01-01

## 2019-01-01 RX ORDER — METOPROLOL TARTRATE 50 MG
50 TABLET ORAL
Refills: 0 | Status: DISCONTINUED | OUTPATIENT
Start: 2019-01-01 | End: 2020-01-01

## 2019-01-01 RX ORDER — INSULIN LISPRO 100/ML
VIAL (ML) SUBCUTANEOUS AT BEDTIME
Refills: 0 | Status: DISCONTINUED | OUTPATIENT
Start: 2019-01-01 | End: 2019-01-01

## 2019-01-01 RX ORDER — INSULIN LISPRO 100/ML
VIAL (ML) SUBCUTANEOUS
Refills: 0 | Status: DISCONTINUED | OUTPATIENT
Start: 2019-01-01 | End: 2019-01-01

## 2019-01-01 RX ORDER — CHOLECALCIFEROL (VITAMIN D3) 125 MCG
0 CAPSULE ORAL
Qty: 0 | Refills: 0 | DISCHARGE

## 2019-01-01 RX ORDER — WARFARIN SODIUM 2.5 MG/1
1 TABLET ORAL
Qty: 0 | Refills: 0 | DISCHARGE

## 2019-01-01 RX ORDER — DIGOXIN 0.12 MG/1
125 TABLET ORAL
Qty: 45 | Refills: 3 | Status: DISCONTINUED | COMMUNITY
Start: 2017-03-20 | End: 2019-01-01

## 2019-01-01 RX ORDER — MITOMYCIN 40 MG/80ML
40 INJECTION, POWDER, LYOPHILIZED, FOR SOLUTION INTRAVENOUS
Qty: 1 | Refills: 5 | Status: ACTIVE | COMMUNITY
Start: 2019-01-01 | End: 1900-01-01

## 2019-01-01 RX ORDER — CHOLECALCIFEROL (VITAMIN D3) 125 MCG
1000 CAPSULE ORAL DAILY
Refills: 0 | Status: DISCONTINUED | OUTPATIENT
Start: 2019-01-01 | End: 2019-01-01

## 2019-01-01 RX ORDER — DIGOXIN 250 MCG
0.12 TABLET ORAL EVERY OTHER DAY
Refills: 0 | Status: DISCONTINUED | OUTPATIENT
Start: 2019-01-01 | End: 2019-01-01

## 2019-01-01 RX ORDER — LEVALBUTEROL 1.25 MG/.5ML
0.63 SOLUTION, CONCENTRATE RESPIRATORY (INHALATION) EVERY 4 HOURS
Refills: 0 | Status: DISCONTINUED | OUTPATIENT
Start: 2019-01-01 | End: 2020-01-01

## 2019-01-01 RX ORDER — ALBUTEROL 90 UG/1
2 AEROSOL, METERED ORAL
Qty: 1 | Refills: 0
Start: 2019-01-01

## 2019-01-01 RX ORDER — SODIUM CHLORIDE 9 MG/ML
500 INJECTION INTRAMUSCULAR; INTRAVENOUS; SUBCUTANEOUS ONCE
Refills: 0 | Status: COMPLETED | OUTPATIENT
Start: 2019-01-01 | End: 2019-01-01

## 2019-01-01 RX ORDER — LIDOCAINE HCL 20 MG/ML
0.2 VIAL (ML) INJECTION ONCE
Refills: 0 | Status: DISCONTINUED | OUTPATIENT
Start: 2019-01-01 | End: 2019-01-01

## 2019-01-01 RX ORDER — CEPHALEXIN 500 MG
1 CAPSULE ORAL
Qty: 14 | Refills: 0
Start: 2019-01-01 | End: 2019-01-01

## 2019-01-01 RX ORDER — HEPARIN SODIUM 5000 [USP'U]/ML
5000 INJECTION INTRAVENOUS; SUBCUTANEOUS EVERY 8 HOURS
Refills: 0 | Status: DISCONTINUED | OUTPATIENT
Start: 2019-01-01 | End: 2019-01-01

## 2019-01-01 RX ORDER — TAMSULOSIN HYDROCHLORIDE 0.4 MG/1
0.8 CAPSULE ORAL AT BEDTIME
Refills: 0 | Status: DISCONTINUED | OUTPATIENT
Start: 2019-01-01 | End: 2020-01-01

## 2019-01-01 RX ORDER — ACETAMINOPHEN 500 MG
975 TABLET ORAL ONCE
Refills: 0 | Status: COMPLETED | OUTPATIENT
Start: 2019-01-01 | End: 2019-01-01

## 2019-01-01 RX ORDER — SODIUM CHLORIDE 9 MG/ML
3 INJECTION INTRAMUSCULAR; INTRAVENOUS; SUBCUTANEOUS EVERY 8 HOURS
Refills: 0 | Status: DISCONTINUED | OUTPATIENT
Start: 2019-01-01 | End: 2019-01-01

## 2019-01-01 RX ORDER — TAMSULOSIN HYDROCHLORIDE 0.4 MG/1
1 CAPSULE ORAL
Qty: 0 | Refills: 0 | DISCHARGE

## 2019-01-01 RX ORDER — SODIUM CHLORIDE 9 MG/ML
500 INJECTION, SOLUTION INTRAVENOUS ONCE
Refills: 0 | Status: COMPLETED | OUTPATIENT
Start: 2019-01-01 | End: 2019-01-01

## 2019-01-01 RX ORDER — LOSARTAN POTASSIUM 100 MG/1
100 TABLET, FILM COATED ORAL DAILY
Refills: 0 | Status: DISCONTINUED | OUTPATIENT
Start: 2019-01-01 | End: 2019-01-01

## 2019-01-01 RX ORDER — METOPROLOL TARTRATE 50 MG
100 TABLET ORAL
Refills: 0 | Status: DISCONTINUED | OUTPATIENT
Start: 2019-01-01 | End: 2019-01-01

## 2019-01-01 RX ORDER — METOPROLOL TARTRATE 50 MG
1 TABLET ORAL
Qty: 0 | Refills: 0 | DISCHARGE

## 2019-01-01 RX ORDER — WARFARIN SODIUM 2.5 MG/1
6 TABLET ORAL ONCE
Refills: 0 | Status: COMPLETED | OUTPATIENT
Start: 2019-01-01 | End: 2019-01-01

## 2019-01-01 RX ORDER — TAMSULOSIN HYDROCHLORIDE 0.4 MG/1
0.8 CAPSULE ORAL AT BEDTIME
Refills: 0 | Status: DISCONTINUED | OUTPATIENT
Start: 2019-01-01 | End: 2019-01-01

## 2019-01-01 RX ORDER — ATORVASTATIN CALCIUM 80 MG/1
10 TABLET, FILM COATED ORAL AT BEDTIME
Refills: 0 | Status: DISCONTINUED | OUTPATIENT
Start: 2019-01-01 | End: 2019-01-01

## 2019-01-01 RX ORDER — LOSARTAN POTASSIUM 100 MG/1
100 TABLET, FILM COATED ORAL DAILY
Refills: 0 | Status: DISCONTINUED | OUTPATIENT
Start: 2019-01-01 | End: 2020-01-01

## 2019-01-01 RX ORDER — PHYTONADIONE (VIT K1) 5 MG
5 TABLET ORAL ONCE
Refills: 0 | Status: COMPLETED | OUTPATIENT
Start: 2019-01-01 | End: 2019-01-01

## 2019-01-01 RX ORDER — CIPROFLOXACIN LACTATE 400MG/40ML
400 VIAL (ML) INTRAVENOUS ONCE
Refills: 0 | Status: DISCONTINUED | OUTPATIENT
Start: 2019-01-01 | End: 2019-01-01

## 2019-01-01 RX ORDER — DEXAMETHASONE 0.5 MG/5ML
8 ELIXIR ORAL ONCE
Refills: 0 | Status: DISCONTINUED | OUTPATIENT
Start: 2019-01-01 | End: 2019-01-01

## 2019-01-01 RX ADMIN — Medication 100 MILLIGRAM(S): at 20:13

## 2019-01-01 RX ADMIN — Medication 20 MILLIGRAM(S): at 17:52

## 2019-01-01 RX ADMIN — Medication 100 MILLIGRAM(S): at 16:05

## 2019-01-01 RX ADMIN — LOSARTAN POTASSIUM 100 MILLIGRAM(S): 100 TABLET, FILM COATED ORAL at 05:47

## 2019-01-01 RX ADMIN — WARFARIN SODIUM 5 MILLIGRAM(S): 2.5 TABLET ORAL at 02:06

## 2019-01-01 RX ADMIN — Medication 975 MILLIGRAM(S): at 22:24

## 2019-01-01 RX ADMIN — Medication 50 MILLIGRAM(S): at 02:06

## 2019-01-01 RX ADMIN — Medication 5 MILLIGRAM(S): at 10:55

## 2019-01-01 RX ADMIN — Medication 100 MILLIGRAM(S): at 23:22

## 2019-01-01 RX ADMIN — HEPARIN SODIUM 5000 UNIT(S): 5000 INJECTION INTRAVENOUS; SUBCUTANEOUS at 05:42

## 2019-01-01 RX ADMIN — Medication 200 MILLIGRAM(S): at 20:14

## 2019-01-01 RX ADMIN — TAMSULOSIN HYDROCHLORIDE 0.8 MILLIGRAM(S): 0.4 CAPSULE ORAL at 23:26

## 2019-01-01 RX ADMIN — Medication 3 MILLILITER(S): at 18:02

## 2019-01-01 RX ADMIN — Medication 1: at 05:42

## 2019-01-01 RX ADMIN — SODIUM CHLORIDE 3000 MILLILITER(S): 9 INJECTION, SOLUTION INTRAVENOUS at 10:28

## 2019-01-01 RX ADMIN — Medication 50 MILLIGRAM(S): at 17:52

## 2019-01-01 RX ADMIN — Medication 100 MILLIGRAM(S): at 23:26

## 2019-01-01 RX ADMIN — Medication 0.12 MILLIGRAM(S): at 13:00

## 2019-01-01 RX ADMIN — Medication 1000 UNIT(S): at 13:00

## 2019-01-01 RX ADMIN — Medication 0.12 MILLIGRAM(S): at 11:44

## 2019-01-01 RX ADMIN — Medication 100 MILLIGRAM(S): at 12:41

## 2019-01-01 RX ADMIN — LEVALBUTEROL 0.63 MILLIGRAM(S): 1.25 SOLUTION, CONCENTRATE RESPIRATORY (INHALATION) at 14:09

## 2019-01-01 RX ADMIN — LEVALBUTEROL 0.63 MILLIGRAM(S): 1.25 SOLUTION, CONCENTRATE RESPIRATORY (INHALATION) at 17:52

## 2019-01-01 RX ADMIN — Medication 50 MILLIGRAM(S): at 05:47

## 2019-01-01 RX ADMIN — Medication 100 MILLIGRAM(S): at 05:43

## 2019-01-01 RX ADMIN — LEVALBUTEROL 0.63 MILLIGRAM(S): 1.25 SOLUTION, CONCENTRATE RESPIRATORY (INHALATION) at 23:20

## 2019-01-01 RX ADMIN — TAMSULOSIN HYDROCHLORIDE 0.8 MILLIGRAM(S): 0.4 CAPSULE ORAL at 21:16

## 2019-01-01 RX ADMIN — CEFTRIAXONE 100 MILLIGRAM(S): 500 INJECTION, POWDER, FOR SOLUTION INTRAMUSCULAR; INTRAVENOUS at 09:23

## 2019-01-01 RX ADMIN — ALBUTEROL 2.5 MILLIGRAM(S): 90 AEROSOL, METERED ORAL at 11:44

## 2019-01-01 RX ADMIN — SODIUM CHLORIDE 75 MILLILITER(S): 9 INJECTION, SOLUTION INTRAVENOUS at 16:05

## 2019-01-01 RX ADMIN — ATORVASTATIN CALCIUM 10 MILLIGRAM(S): 80 TABLET, FILM COATED ORAL at 23:23

## 2019-01-01 RX ADMIN — Medication 40 MILLIGRAM(S): at 05:47

## 2019-01-01 RX ADMIN — SODIUM CHLORIDE 500 MILLILITER(S): 9 INJECTION INTRAMUSCULAR; INTRAVENOUS; SUBCUTANEOUS at 22:24

## 2019-01-01 RX ADMIN — Medication 325 MILLIGRAM(S): at 13:00

## 2019-01-01 RX ADMIN — Medication 10 MILLIGRAM(S): at 18:47

## 2019-01-01 RX ADMIN — Medication 975 MILLIGRAM(S): at 23:20

## 2019-01-01 RX ADMIN — Medication 3 MILLILITER(S): at 20:07

## 2019-01-01 RX ADMIN — SODIUM CHLORIDE 500 MILLILITER(S): 9 INJECTION INTRAMUSCULAR; INTRAVENOUS; SUBCUTANEOUS at 22:25

## 2019-01-01 RX ADMIN — Medication 40 MILLIGRAM(S): at 05:42

## 2019-01-01 RX ADMIN — TAMSULOSIN HYDROCHLORIDE 0.8 MILLIGRAM(S): 0.4 CAPSULE ORAL at 02:07

## 2019-01-01 RX ADMIN — WARFARIN SODIUM 6 MILLIGRAM(S): 2.5 TABLET ORAL at 21:16

## 2019-01-01 RX ADMIN — ATORVASTATIN CALCIUM 10 MILLIGRAM(S): 80 TABLET, FILM COATED ORAL at 21:16

## 2019-01-01 RX ADMIN — HEPARIN SODIUM 5000 UNIT(S): 5000 INJECTION INTRAVENOUS; SUBCUTANEOUS at 23:23

## 2019-01-01 RX ADMIN — LOSARTAN POTASSIUM 100 MILLIGRAM(S): 100 TABLET, FILM COATED ORAL at 05:42

## 2019-01-01 RX ADMIN — SODIUM CHLORIDE 500 MILLILITER(S): 9 INJECTION INTRAMUSCULAR; INTRAVENOUS; SUBCUTANEOUS at 21:49

## 2019-01-23 ENCOUNTER — APPOINTMENT (OUTPATIENT)
Dept: ELECTROPHYSIOLOGY | Facility: CLINIC | Age: 84
End: 2019-01-23
Payer: MEDICARE

## 2019-01-23 DIAGNOSIS — R00.1 BRADYCARDIA, UNSPECIFIED: ICD-10-CM

## 2019-01-23 PROCEDURE — 93280 PM DEVICE PROGR EVAL DUAL: CPT

## 2019-01-24 ENCOUNTER — APPOINTMENT (OUTPATIENT)
Dept: CARDIOLOGY | Facility: CLINIC | Age: 84
End: 2019-01-24
Payer: MEDICARE

## 2019-01-24 ENCOUNTER — NON-APPOINTMENT (OUTPATIENT)
Age: 84
End: 2019-01-24

## 2019-01-24 VITALS
HEART RATE: 87 BPM | WEIGHT: 150 LBS | BODY MASS INDEX: 24.99 KG/M2 | DIASTOLIC BLOOD PRESSURE: 68 MMHG | OXYGEN SATURATION: 94 % | SYSTOLIC BLOOD PRESSURE: 126 MMHG | TEMPERATURE: 98.6 F | RESPIRATION RATE: 17 BRPM | HEIGHT: 65 IN

## 2019-01-24 PROCEDURE — 99215 OFFICE O/P EST HI 40 MIN: CPT

## 2019-01-24 PROCEDURE — 93000 ELECTROCARDIOGRAM COMPLETE: CPT

## 2019-01-25 ENCOUNTER — CHART COPY (OUTPATIENT)
Age: 84
End: 2019-01-25

## 2019-02-23 ENCOUNTER — APPOINTMENT (OUTPATIENT)
Dept: CARDIOLOGY | Facility: CLINIC | Age: 84
End: 2019-02-23
Payer: MEDICARE

## 2019-02-23 PROCEDURE — 93306 TTE W/DOPPLER COMPLETE: CPT

## 2019-02-25 ENCOUNTER — RX RENEWAL (OUTPATIENT)
Age: 84
End: 2019-02-25

## 2019-04-18 NOTE — HISTORY OF PRESENT ILLNESS
[FreeTextEntry1] : This is a 89 year old man with a history of CAD s/p PCI to the mid LAD with a BMS in 1994, double vessel CABG and mitral valve repair February 8th, 2013, paroxysmal atrial fibrillation/flutter with TBS s/p [employer] dual chamber PPM, on Coumadin for stroke risk reduction, hypertension and hyperlipidemia, mild LV dysfunction who presents to the office for follow up.  His dyspnea with exertion is at baseline.  His LE edema is at baseline.  He is now on Lasix BID dosing.   He is not reporting any shortness of breath at night.  He is on Coumadin, and his INR has been within range.    He is known to have paroxysms of rapid atrial arrhythmia, and at times he gets short of breath.  He does not have chest pain, palpitations, dizziness, lightheadedness, or syncope.   His INR is being managed in your office, and he reports that it was recently within range.  His aspirin as been stopped to decrease his bleeding risk.  He is denying fevers or chills.  He does not have a cough.  Overall he has been stable since his last visit here.

## 2019-04-18 NOTE — DISCUSSION/SUMMARY
[FreeTextEntry1] : This is a 89 year old man with a history of CAD s/p PCI to LAD with BMS in 1994, double vessel CABG and mitral valve repair on 2/8/2013, mild LV dysfunction, paroxysmal atrial fibrillation, TBS s/p [employer] dual chamber PPM, anticoagulation with Coumadin, hypertension, and hyperlipidemia presents to the office for follow up.  He appears euvolemic on his exam with no pulmonary edema. His LE edema is at baseline on BID Lasix.  He has moderate to severe MR and TR with moderate pulmonary hypertension. This is likely the etiology of his edema.  He has had no admissions for heart failure, and his ET is at baseline.  He is not having any evidence of occult bleeding.  His heart rate and blood pressure are controlled. He will continue Toprol and Digoxin.  He will continue to monitor his blood pressure and heart rate, and call me with any issues.  I will continue him off of aspirin as he has stable CAD and is on AC with Coumadin.  He will continue Coumadin for a goal INR of 2-3.  He will continue the remainder of his medications as written.  He is up to date with his device check.  I will see him back in three months.  He knows to call the office with any issues.

## 2019-04-18 NOTE — REASON FOR VISIT
[Follow-Up - Clinic] : a clinic follow-up of [Anticoagulation] : anticoagulation [Atrial Fibrillation] : atrial fibrillation [CABG Follow-up] : bypass graft [Coronary Artery Disease] : coronary artery disease [Dyspnea] : dyspnea [Hyperlipidemia] : hyperlipidemia [Mitral Regurgitation] : mitral regurgitation [Pacemaker Evaluation] : pacemaker ~T evaluation ~C was performed

## 2019-04-18 NOTE — REVIEW OF SYSTEMS
[see HPI] : see HPI [Negative] : Heme/Lymph [Fever] : no fever [Headache] : no headache [Recent Weight Gain (___ Lbs)] : no recent weight gain [Feeling Fatigued] : not feeling fatigued [Chills] : no chills [Blurry Vision] : no blurred vision [Recent Weight Loss (___ Lbs)] : no recent weight loss [Seeing Double (Diplopia)] : no diplopia [Dyspnea on exertion] : not dyspnea during exertion [Shortness Of Breath] : no shortness of breath [Chest  Pressure] : no chest pressure [Chest Pain] : no chest pain [Lower Ext Edema] : no extremity edema [Palpitations] : no palpitations [Leg Claudication] : no intermittent leg claudication [Wheezing] : no wheezing [Cough] : no cough

## 2019-04-18 NOTE — CARDIOLOGY SUMMARY
[LVEF ___%] : LVEF [unfilled]% [Mild] : mild LV dysfunction [Moderate] : moderate pulmonary hypertension [Enlarged] : enlarged LA size [___] : [unfilled]

## 2019-04-18 NOTE — PHYSICAL EXAM
[General Appearance - In No Acute Distress] : no acute distress [Normal Appearance] : normal appearance [Normal Conjunctiva] : the conjunctiva exhibited no abnormalities [Normal Oral Mucosa] : normal oral mucosa [Normal Jugular Venous V Waves Present] : normal jugular venous V waves present [Exaggerated Use Of Accessory Muscles For Inspiration] : no accessory muscle use [Respiration, Rhythm And Depth] : normal respiratory rhythm and effort [Auscultation Breath Sounds / Voice Sounds] : lungs were clear to auscultation bilaterally [Abdomen Soft] : soft [Bowel Sounds] : normal bowel sounds [Abdomen Tenderness] : non-tender [Abnormal Walk] : normal gait [Cyanosis, Localized] : no localized cyanosis [Nail Clubbing] : no clubbing of the fingernails [Petechial Hemorrhages (___cm)] : no petechial hemorrhages [Skin Color & Pigmentation] : normal skin color and pigmentation [] : no rash [No Venous Stasis] : no venous stasis [Skin Lesions] : no skin lesions [No Skin Ulcers] : no skin ulcer [Oriented To Time, Place, And Person] : oriented to person, place, and time [Mood] : the mood was normal [Affect] : the affect was normal [No Anxiety] : not feeling anxious [Not Palpable] : not palpable [No Precordial Heave] : no precordial heave was noted [Normal S1] : normal S1 [Irregularly Irregular] : irregularly irregular [Normal Rate] : normal [Normal S2] : normal S2 [No Gallop] : no gallop heard [II] : a grade 2 [I] : a grade 1 [1+] : right 1+ [No Abnormalities] : the abdominal aorta was not enlarged and no bruit was heard [___ +] : bilateral [unfilled]U+ pretibial pitting edema [FreeTextEntry1] : No JVD. [Right Carotid Bruit] : no bruit heard over the right carotid [Bruit] : no bruit heard [Left Carotid Bruit] : no bruit heard over the left carotid

## 2019-06-04 NOTE — ED ADULT NURSE NOTE - PSH
Artificial pacemaker  [employer] Model #[id number] Serial #[id number]  Breast cyst  bilateral around 50 years ago  S/P CABG (coronary artery bypass graft)  x2 2013  S/P heart valve repair  mitral valve repair with annuloplasty ring 2013  S/P small bowel resection  due to perforated intestine  during colonoscopy around 2000  Stented coronary artery  1996 x1

## 2019-06-04 NOTE — CONSULT NOTE ADULT - ASSESSMENT
Pt is a 89 yo M with a PMH of A-Fib (on Coumadin), HTN, HLD, CAD (s/p CABG), Cataracts and Glaucoma who is presenting for intermittent episodes of b/l hand numbness, most recently on awakening this AM in the right hand lasting several seconds to minutes in the median distribution. Exam is unremarkable. 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    Recommendations:  - f/u CT/CTA results  - f/u outpatient neurology for EMG/NCS, can call [phone] for [facility]

## 2019-06-04 NOTE — ED PROVIDER NOTE - CARE PROVIDER_API CALL
Gamal Nelson (DO)  Neurology; Vascular Neurology  [address] [facility] [address]  [place] [address]  Phone: [phone]  Fax: [phone]  Follow Up Time:

## 2019-06-04 NOTE — ED PROVIDER NOTE - OBJECTIVE STATEMENT
89 YOM pmh cAD, HLD, hTN p/w right 4th/5th digit numbness that woke pt up this morning. Pt denies any weakness, denies any chest pain, denies back pain. Denies any current headache. No difficulty speaking, no difficulty ambulating. Symptoms resolved after a few minutes of shaking his arm. Pt states he sleeps on his right arm/hand. Pt had a similar symptoms 1 week ago but had the numbness in the left hand. No fever, no chills or other symptoms.

## 2019-06-04 NOTE — CONSULT NOTE ADULT - SUBJECTIVE AND OBJECTIVE BOX
HPI:  Pt is a 89 yo M with a PMH of A-Fib (on Coumadin)         MEDICATIONS  (STANDING):    MEDICATIONS  (PRN):    PAST MEDICAL & SURGICAL HISTORY:  Phimosis  Seizure: age 9, had 1 seizure, s/p fall  GERD (gastroesophageal reflux disease)  Valvular heart disease: S/p mitral valve repair  CAD (coronary artery disease): x1 stent 1996  S/P CABGx2 2005  BPH (benign prostatic hyperplasia)  HLD (hyperlipidemia)  HTN (hypertension)  Atrial fibrillation  Cataract  Glaucoma  Artificial pacemaker: St [employer] Model #[id number] Serial #[id number]  S/P heart valve repair: mitral valve repair with annuloplasty ring 2013  S/P CABG (coronary artery bypass graft): x2 2013  S/P small bowel resection: due to perforated intestine  during colonoscopy around 2000  Breast cyst: bilateral around 50 years ago  Stented coronary artery: 1996 x1    FAMILY HISTORY:  Family history of ischemic heart disease (Mother)    Allergies    No Known Allergies    Intolerances        SHx - No smoking, No ETOH, No drug abuse      Review of Systems:  CONSTITUTIONAL:   HEENT:  No visual loss, blurred vision, double vision.  No hearing loss, sneezing, congestion, runny nose or sore throat.  SKIN:  No rash or itching.  CARDIOVASCULAR:  No chest pain, chest pressure or chest discomfort. No palpitations or edema.  RESPIRATORY:  No shortness of breath, cough or sputum.  GASTROINTESTINAL:  No anorexia, nausea, vomiting or diarrhea. No abdominal pain.  GENITOURINARY:  NO dysuria. No increased frequency. No retention. No incontinence.  NEUROLOGICAL:  See HPI  MUSCULOSKELETAL:  No muscle, back pain, joint pain or stiffness.  HEMATOLOGIC:  No anemia, bleeding or bruising.  PSYCHIATRIC:    ENDOCRINOLOGIC:  No reports of sweating, cold or heat intolerance. No polyuria or polydipsia.        Vital Signs Last 24 Hrs  T(C): 36.5 (04 Jun 2019 07:35), Max: 36.5 (04 Jun 2019 07:35)  T(F): 97.7 (04 Jun 2019 07:35), Max: 97.7 (04 Jun 2019 07:35)  HR: 69 (04 Jun 2019 07:35) (69 - 69)  BP: 149/75 (04 Jun 2019 07:35) (149/75 - 149/75)  BP(mean): --  RR: 18 (04 Jun 2019 07:35) (18 - 18)  SpO2: 99% (04 Jun 2019 07:35) (99% - 99%)    General Exam:   General appearance: No acute distress    Cardiac:  Pulm:                 Neurological Exam:  Mental Status: Orientated to self, date and place.  Attention intact.  No dysarthria. Speech fluent.  Cranial Nerves:   PERRL, EOMI, VFF, no nystagmus.    CN V1-3 intact to light touch .  No facial asymmetry.  Hearing intact to finger rub bilaterally.  Tongue, uvula and palate midline.  Sternocleidomastoid and Trapezius intact bilaterally.    Motor:   Tone: normal.                  Strength:     [] Upper extremity                      Delt       Bicep    Tricep                                                  R         5/5 5/5 5/5 5/5                                               L          5/5 5/5 5/5 5/5  [] Lower extremity                       HF          KE          KF        DF         PF                                               R        5/5 5/5 5/5 5/5 5/5                                               L         5/5 5/5 5/5 5/5 5/5  Pronator drift: none                 Dysmetria: None to finger-nose-finger or heel-shin-heel  No truncal ataxia.    Tremor: No resting, postural or action tremor.  No myoclonus.    Sensation: intact to light touch, pinprick, vibration and proprioception    Deep Tendon Reflexes:     Biceps          Triceps      BR        Patellar        Ankle         Babinski                                         R       2+                   2+           2+            2+               2+           downgoing                                         L        2+                  2+           2+            2+               2+           downgoing    Gait: normal.      Other:                Radiology    CT:   MRI  EKG:  tele:  TTE:  EEG: HPI:  Pt is a 89 yo M with a PMH of A-Fib (on Coumadin), HTN, HLD, CAD (s/p CABG), Cataracts and Glaucoma who is presenting for intermittent episodes of b/l hand numbness, most recently on awakening this AM in the right hand lasting several seconds to minutes. Pt notes that the numbness was in the first three fingers. LKN 2300 night PTA. Otherwise pt denies any fevers, chills, HA, dizziness, weakness, vision changes, changes in speech.      MEDICATIONS  (STANDING):    MEDICATIONS  (PRN):    PAST MEDICAL & SURGICAL HISTORY:  Phimosis  Seizure: age 9, had 1 seizure, s/p fall  GERD (gastroesophageal reflux disease)  Valvular heart disease: S/p mitral valve repair  CAD (coronary artery disease): x1 stent 1996  S/P CABGx2 2005  BPH (benign prostatic hyperplasia)  HLD (hyperlipidemia)  HTN (hypertension)  Atrial fibrillation  Cataract  Glaucoma  Artificial pacemaker: [email or web address] Model #[id number] Serial #[id number]  S/P heart valve repair: mitral valve repair with annuloplasty ring 2013  S/P CABG (coronary artery bypass graft): x2 2013  S/P small bowel resection: due to perforated intestine  during colonoscopy around 2000  Breast cyst: bilateral around 50 years ago  Stented coronary artery: 1996 x1    FAMILY HISTORY:  Family history of ischemic heart disease (Mother)    Allergies    No Known Allergies    Intolerances    SHx - No smoking, No ETOH, No drug abuse    Review of Systems:  See HPI.    Vital Signs Last 24 Hrs  T(C): 36.5 (04 Jun 2019 07:35), Max: 36.5 (04 Jun 2019 07:35)  T(F): 97.7 (04 Jun 2019 07:35), Max: 97.7 (04 Jun 2019 07:35)  HR: 69 (04 Jun 2019 07:35) (69 - 69)  BP: 149/75 (04 Jun 2019 07:35) (149/75 - 149/75)  BP(mean): --  RR: 18 (04 Jun 2019 07:35) (18 - 18)  SpO2: 99% (04 Jun 2019 07:35) (99% - 99%)      General Exam:   General appearance: No acute distress            Neurological Exam:  Mental Status: Orientated to self, date and place.  Attention intact.  No dysarthria. Speech fluent.  Cranial Nerves:   PERRL, EOMI, VFF, no nystagmus.    CN V1-3 intact to light touch .  No facial asymmetry.  Hearing intact to finger rub bilaterally.  Tongue, uvula and palate midline.  Sternocleidomastoid and Trapezius intact bilaterally.    Motor:   Tone: normal.                  Strength:     [] Upper extremity                      Delt       Bicep    Tricep                                                  R         5/5 5/5 5/5 5/5                                               L          5/5 5/5 5/5 5/5  [] Lower extremity                       HF          KE          KF        DF         PF                                               R        5/5 5/5 5/5 5/5 5/5                                               L         5/5 5/5 5/5 5/5 5/5  Pronator drift: none                 Dysmetria: None to finger-nose-finger b/l  No truncal ataxia.    Tremor: No resting, postural or action tremor.  No myoclonus.    Sensation: intact to light touch throughout, no extinction    Toes downgoing b/l

## 2019-06-04 NOTE — ED PROVIDER NOTE - NEUROLOGICAL, MLM
Alert and oriented, no focal deficits, no motor or sensory deficits. NIHSS = 0. Pt ambulatory without difficulty, no drift.

## 2019-06-04 NOTE — ED ADULT NURSE NOTE - NSIMPLEMENTINTERV_GEN_ALL_ED
Implemented All Fall with Harm Risk Interventions:  [place] to call system. Call bell, personal items and telephone within reach. Instruct patient to call for assistance. Room bathroom lighting operational. Non-slip footwear when patient is off stretcher. Physically safe environment: no spills, clutter or unnecessary equipment. Stretcher in lowest position, wheels locked, appropriate side rails in place. Provide visual cue, wrist band, yellow gown, etc. Monitor gait and stability. Monitor for mental status changes and reorient to person, place, and time. Review medications for side effects contributing to fall risk. Reinforce activity limits and safety measures with patient and family. Provide visual clues: red socks.

## 2019-06-04 NOTE — ED PROVIDER NOTE - ATTENDING CONTRIBUTION TO CARE
Patient presenting complaining of numbness in hand.  Has been happening intermittently on both sides over last few weeks.  This morning noticed numbness in hand starting at 5:30AM (was awake and going to bathroom).  Lasted a few minutes and then resolved.  Also reporting intermittent frontal headaches over same time frame.  On coumadin at home.  No associated weakness, slurred speech or facial droop.    A 14 point review of systems is negative except as in HPI or otherwise documented.    Exam:  General: Patient well appearing, vital signs within normal limits  HEENT: airway patent with moist mucous membranes  Cardiac: RRR S1/S2 with strong peripheral pulses  Respiratory: lungs clear without respiratory distress  GI: abdomen soft, non tender, non distended  Neuro: no gross neurologic deficits  Skin: warm, well perfused  Psych: normal mood and affect    Patient made code stroke activation given unilateral sensation changes and age potentially within tPA window, however given normal exam and NIHSS of 0 on presentation tPA not administered, will obtain labs, CT/CTA, if normal per neuro can follow up as outpatient for further workup.

## 2019-06-04 NOTE — ED PROVIDER NOTE - NSFOLLOWUPINSTRUCTIONS_ED_ALL_ED_FT
1. Return to ED for worsening, progressive or any other concerning symptoms   2. Follow up with Dr. Nelson in 2 days for outpatient workup.   3. Return if your numbness returns or you experience any weakness.

## 2019-06-04 NOTE — ED ADULT NURSE NOTE - OBJECTIVE STATEMENT
89y Male A&OX3 came to ED c/o numbness and tingling on R hand.  PMH of Pacemaker, CAD with stent and CABG on Coumadin, Seizures (10 y/o).  Pt states he woke up from numbness and tingling this morning at 5AM, last known normal at 2AM when he woke up to go to bathroom.  Pt states symptoms are intermittent but wanted to check into ED.  Pt presents today with full facial symmetry, PERRL, able to follow commands, equal sensation and strength on all extremities.  Pt denies numbness and tingling on R hand at this time. No arm drift, slurred speech, facial droop, CP, SOB, n/v/d, dizziness and lightheadedness.  Upon examination, no JVD or trach deviation, lung sounds clear and adequate chest rise, S1 and S2 heart sounds present, +2 pulses present in all extremities, ABD soft, non distended and non tender, frequent urination. 89y Male A&OX3 came to ED c/o numbness and tingling on R hand.  PMH of Pacemaker, CAD with stent and CABG on Coumadin, Seizures (10 y/o).  Pt states he woke up from numbness and tingling in R hand this morning at 5AM, last known normal at 2AM when he woke up to go to bathroom.  Pt states symptoms are intermittent but wanted to check into ED.  Pt presents today with full facial symmetry, PERRL, able to follow commands, equal sensation and strength on all extremities.  Pt denies numbness and tingling on R hand at this time. No arm drift, slurred speech, facial droop, CP, SOB, n/v/d, dizziness and lightheadedness.  Upon examination, no JVD or trach deviation, lung sounds clear and adequate chest rise, S1 and S2 heart sounds present, +2 pulses present in all extremities, ABD soft, non distended and non tender, frequent urination. Please see neuro flowsheet in paper chart for continued neuro checks and vital signs.

## 2019-06-04 NOTE — ED PROVIDER NOTE - NS ED ROS FT
CONSTITUTIONAL: No fevers, no chills  Eyes: no visual changes  Ears: no ear drainage, no ear pain  Nose: no nasal congestion  Mouth/Throat: no sore throat  Cardiovascular: No Chest pain  Respiratory: No SOB  Gastrointestinal: No n/v/d, no abd pain  Genitourinary: no dysuria, no hematuria  SKIN: no rashes.  NEURO: +hand numbness

## 2019-06-04 NOTE — ED ADULT NURSE REASSESSMENT NOTE - NS ED NURSE REASSESS COMMENT FT1
Pt states he hasn't had a reoccurrence of numbness and tingling.  As per Edd PATEL, Pt cleared for discharge.  Pt instructed to follow up with Neurology in next two days, instructed to come back to ED if any worsening symptoms. Pt discharge on wheelchair with daughter. Pt states he hasn't had a reoccurrence of numbness and tingling. Pt passed dysphasia screen.  CT came back negative.  As per Edd PATEL, Pt cleared for discharge.  Pt instructed to follow up with Neurology in next two days, instructed to come back to ED if any worsening symptoms. Pt discharge on wheelchair with daughter.

## 2019-06-04 NOTE — ED PROVIDER NOTE - CLINICAL SUMMARY MEDICAL DECISION MAKING FREE TEXT BOX
Code stroke activated for possible TIA. Pt not TPA candidate given elevated INR. Given pt with paresthesia in distribution of ulnar nerve unlikely a stroke. Will get labs to check for electrolyte abnormalities. Labs resulted without electrolyte abnormalities. Will have pt follow up with neurology.

## 2019-06-04 NOTE — ED PROVIDER NOTE - PSH
Artificial pacemaker  St [employer] Model #[id number] Serial #[id number]  Breast cyst  bilateral around 50 years ago  S/P CABG (coronary artery bypass graft)  x2 2013  S/P heart valve repair  mitral valve repair with annuloplasty ring 2013  S/P small bowel resection  due to perforated intestine  during colonoscopy around 2000  Stented coronary artery  1996 x1

## 2019-07-23 PROBLEM — I48.2 PERMANENT ATRIAL FIBRILLATION: Status: ACTIVE | Noted: 2019-01-01

## 2019-07-23 PROBLEM — Z95.0 CARDIAC PACEMAKER: Status: ACTIVE | Noted: 2019-01-01

## 2019-07-23 PROBLEM — I49.5 TACHY-BRADY SYNDROME: Status: ACTIVE | Noted: 2019-01-01

## 2019-07-23 NOTE — DISCUSSION/SUMMARY
[FreeTextEntry1] : In summary Devyn Johnson is a 89y/o man with Hx of HTN, HLD, BPH, CAD s/p PCI, CABG, and mitral valve repair (2013),  tachybrady syndrome with PPM and persistent atrial fibrillation/atrial flutter maintained on metoprolol and warfarin who presents today for routine f/u and device check.  Admits feeling well with no issues or complaints. Denies chest pain, palpitations, SOB, fatigue, syncope or near syncope. Patient in atrial flutter a majority of the time.  He does occasionally develop dyspnea on exertion, but for the most part feels well. Device check revealed pacemaker in good working status with adequate pacing/sensing thresholds. Discussed cardioversion with patient, but his quality of life is good and would prefer to continue as it. Will continue routine f/u in device and regular f/u with Cardiologist. \par \par Sincerely,\par \par John Finnegan MD

## 2019-07-23 NOTE — PHYSICAL EXAM
[General Appearance - Well Developed] : well developed [Normal Appearance] : normal appearance [Well Groomed] : well groomed [No Deformities] : no deformities [General Appearance - Well Nourished] : well nourished [General Appearance - In No Acute Distress] : no acute distress [Normal Conjunctiva] : the conjunctiva exhibited no abnormalities [Eyelids - No Xanthelasma] : the eyelids demonstrated no xanthelasmas [No Oral Pallor] : no oral pallor [Normal Oral Mucosa] : normal oral mucosa [Normal Jugular Venous A Waves Present] : normal jugular venous A waves present [Normal Jugular Venous V Waves Present] : normal jugular venous V waves present [No Oral Cyanosis] : no oral cyanosis [No Jugular Venous Stapleton A Waves] : no jugular venous stapleton A waves [Respiration, Rhythm And Depth] : normal respiratory rhythm and effort [Exaggerated Use Of Accessory Muscles For Inspiration] : no accessory muscle use [Heart Sounds] : normal S1 and S2 [Heart Rate And Rhythm] : heart rate and rhythm were normal [Auscultation Breath Sounds / Voice Sounds] : lungs were clear to auscultation bilaterally [Murmurs] : no murmurs present [Abdomen Soft] : soft [Abdomen Tenderness] : non-tender [Abnormal Walk] : normal gait [Abdomen Mass (___ Cm)] : no abdominal mass palpated [Nail Clubbing] : no clubbing of the fingernails [Cyanosis, Localized] : no localized cyanosis [Gait - Sufficient For Exercise Testing] : the gait was sufficient for exercise testing [Petechial Hemorrhages (___cm)] : no petechial hemorrhages [Skin Color & Pigmentation] : normal skin color and pigmentation [] : no rash [No Venous Stasis] : no venous stasis [No Skin Ulcers] : no skin ulcer [Skin Lesions] : no skin lesions [Oriented To Time, Place, And Person] : oriented to person, place, and time [Affect] : the affect was normal [No Xanthoma] : no  xanthoma was observed [No Anxiety] : not feeling anxious [Mood] : the mood was normal

## 2019-07-23 NOTE — HISTORY OF PRESENT ILLNESS
[FreeTextEntry1] : Sarthak Kamara MD\par \par Devyn Johnson is a 89y/o man with Hx of HTN, HLD, BPH, CAD s/p PCI, CABG, and mitral valve repair (2013),  tachybrady syndrome with PPM and persistent atrial fibrillation/atrial flutter maintained on metoprolol and warfarin who presents today for routine f/u and device check.  Admits feeling well with no issues or complaints. Denies chest pain, palpitations, SOB, fatigue, syncope or near syncope.

## 2019-09-08 NOTE — ED PROVIDER NOTE - CLINICAL SUMMARY MEDICAL DECISION MAKING FREE TEXT BOX
89 y old male with a multiple medical issues ,BPH ,afib on coumadin ,presented with hematuria ,looks good ,will obtain blood work to ro coagulopathy ,ct scan and on reevaluation: ZR

## 2019-09-08 NOTE — ED PROVIDER NOTE - PROGRESS NOTE DETAILS
Ana PATEL: Pt with WBC 12.6. UA positive for UTI. INR 3.26. Vit K ordered. CT pending. Ana PATEL: CT with nonspecific fat stranding around the kidneys but otherwise non-diagnostic. will send home with rx for keflex and number for outpatient uro follow up.

## 2019-09-08 NOTE — ED PROVIDER NOTE - PSH
Artificial pacemaker  St [employer] Model #[id number] Serial #[id number]  Breast cyst  bilateral around 50 years ago  S/P CABG (coronary artery bypass graft)  x2 2013  S/P heart valve repair  mitral valve repair with annuloplasty ring 2013  S/P small bowel resection  due to perforated intestine  during colonoscopy around 2000  Stented coronary artery  1996 x1

## 2019-09-08 NOTE — ED ADULT NURSE NOTE - OBJECTIVE STATEMENT
89 year old male PMH of cardiac bypass, cardiac stents, BPH, Paget's disease and on Coumadin presents to ED with of hematuria.  Patient said he had one episode of hematuria on Thursday which resolved, but today said he noted more hematuria than last episode and came to the ED.  He denies: shortness of breath, chest pain, abdominal pain, n/v, fevers, dysuria.

## 2019-09-08 NOTE — ED PROVIDER NOTE - OBJECTIVE STATEMENT
89 y old male with hx CAD ,Cabg chronic Afib on coumadin ,hypertension ,HLD  came in with painless hematuria for 2 days ,no fever or chills ,no clots ,denies kidney stones   primary care doctor Dilcia Izquierdo

## 2019-09-08 NOTE — ED ADULT TRIAGE NOTE - BSA (M2)
Patient Education     Pharyngitis: Strep (Confirmed)    You have had a positive test for strep throat. Strep throat is a contagious illness. It is spread by coughing, kissing or by touching others after touching your mouth or nose. Symptoms include throat pain that is worse with swallowing, aching all over, headache, and fever. It is treated with antibiotic medicine. This should help you start to feel better in 1 to 2 days.  Home care  · Rest at home. Drink plenty of fluids to you won't get dehydrated.  · No work or school for the first 2 days of taking the antibiotics. After this time, you will not be contagious. You can then return to school or work if you are feeling better.   · Take antibiotic medicine for the full 10 days, even if you feel better. This is very important to ensure the infection is treated. It is also important to prevent medicine-resistant germs from developing. If you were given an antibiotic shot, you don't need any more antibiotics.  · You may use acetaminophen or ibuprofen to control pain or fever, unless another medicine was prescribed for this. Talk with your healthcare provider before taking these medicines if you have chronic liver or kidney disease. Also talk with your healthcare provider if you have had a stomach ulcer or GI bleeding.  · Throat lozenges or sprays help reduce pain. Gargling with warm saltwater will also reduce throat pain. Dissolve 1/2 teaspoon of salt in 1 glass of warm water. This may be useful just before meals.   · Soft foods are OK. Don't eat salty or spicy foods.  Follow-up care  Follow up with your healthcare provider or our staff if you don't get better over the next week.  When to seek medical advice  Call your healthcare provider right away if any of these occur:  · Fever of 100.4ºF (38ºC) or higher, or as directed by your healthcare provider  · New or worsening ear pain, sinus pain, or headache  · Painful lumps in the back of neck  · Stiff neck  · Lymph  nodes getting larger or becoming soft in the middle  · You can't swallow liquids or you can't open your mouth wide because of throat pain  · Signs of dehydration. These include very dark urine or no urine, sunken eyes, and dizziness.  · Trouble breathing or noisy breathing  · Muffled voice  · Rash  Prevention  Here are steps you can take to help prevent an infection:  · Keep good hand washing habits.  · Don’t have close contact with people who have sore throats, colds, or other upper respiratory infections.  · Don’t smoke, and stay away from secondhand smoke.  Date Last Reviewed: 11/1/2017  © 1359-2256 [employer]. [address] [place] [address], [place] [address]. All rights reserved. This information is not intended as a substitute for professional medical care. Always follow your healthcare professional's instructions.            1.74

## 2019-09-08 NOTE — ED PROVIDER NOTE - PATIENT PORTAL LINK FT
You can access the FollowMyHealth Patient Portal offered by [facility] by registering at the following website: [email or web address]. By joining [employer]’s FollowMyHealth portal, you will also be able to view your health information using other applications (apps) compatible with our system.

## 2019-09-08 NOTE — ED ADULT NURSE REASSESSMENT NOTE - NS ED NURSE REASSESS COMMENT FT1
Patient aware that CT is still pending and called CT and was told patient is next to go.  Patient and daughter updated.

## 2019-09-08 NOTE — ED ADULT NURSE NOTE - PSH
Artificial pacemaker  St [employer] Model #[id number] Serial #[id number]  Breast cyst  bilateral around 50 years ago  S/P CABG (coronary artery bypass graft)  x2 2013  S/P heart valve repair  mitral valve repair with annuloplasty ring 2013  S/P small bowel resection  due to perforated intestine  during colonoscopy around 2000  Stented coronary artery  1996 x1

## 2019-09-08 NOTE — ED ADULT NURSE NOTE - INTERVENTIONS DEFINITIONS
Physically safe environment: no spills, clutter or unnecessary equipment/[place] to call system/Provide visual cue, wrist band, yellow gown, etc./Call bell, personal items and telephone within reach/Stretcher in lowest position, wheels locked, appropriate side rails in place/Provide visual clues: red socks/Instruct patient to call for assistance/Reinforce activity limits and safety measures with patient and family/Monitor gait and stability

## 2019-09-08 NOTE — ED ADULT NURSE REASSESSMENT NOTE - NS ED NURSE REASSESS COMMENT FT1
Patient and daughter updated on plan of care.  Patient denies pain at this time and call bell next to patient, bed in lowest position, red non-skid socks on and patient instructed to call for assistance prior to getting up and patient verbalized understanding.

## 2019-09-08 NOTE — ED PROVIDER NOTE - NSFOLLOWUPCLINICS_GEN_ALL_ED_FT
[facility]  Urology  [address] [place] [address] - 3rd [place] [address]  Phone: [phone]  Fax:   Follow Up Time: 1-3 Days

## 2019-09-08 NOTE — ED PROVIDER NOTE - NSFOLLOWUPINSTRUCTIONS_ED_ALL_ED_FT
1) You were seen in the ED for blood in your urine. You had evidence of a urinary tract infection and received antibiotics.   2) A prescription was sent to your pharmacy for keflex. Please finish the entire course, do not stop early. You should also follow up with a urologist this week. A referral is provided below.  3) Please follow up with your PMD in the next 24-48hrs.  4) Please return to the ED if you have any new or concerning symptoms.

## 2019-09-29 NOTE — ED ADULT NURSE NOTE - NSIMPLEMENTINTERV_GEN_ALL_ED
Implemented All Fall with Harm Risk Interventions:  [place] to call system. Call bell, personal items and telephone within reach. Instruct patient to call for assistance. Room bathroom lighting operational. Non-slip footwear when patient is off stretcher. Physically safe environment: no spills, clutter or unnecessary equipment. Stretcher in lowest position, wheels locked, appropriate side rails in place. Provide visual cue, wrist band, yellow gown, etc. Monitor gait and stability. Monitor for mental status changes and reorient to person, place, and time. Review medications for side effects contributing to fall risk. Reinforce activity limits and safety measures with patient and family. Provide visual clues: red socks.

## 2019-09-29 NOTE — ED PROVIDER NOTE - OBJECTIVE STATEMENT
89M w/ PMH A fib on coumadin, CAD s/p CABG and PPM, HTN, HLD, and DM II who presents with hematuria and foul smelling urine for ____. Of note patient was seen in the ED on 9/8/19 for similar complaints was treated with discharegd home with course of Keflex and 1 dose of vitamin K for INR of 3.29 89M w/ PMH A fib on coumadin, CAD s/p CABG and PPM, HTN, HLD, and DM II who presents with hematuria since last night. Of note patient was seen in the ED on 9/8/19 for similar complaints was treated with discharegd home with course of Keflex and 1 dose of vitamin K for INR of 3.29. Pt endorses that he never followed up w/ urology after the last visit and the hematuria at that time had resolved on it's own. He states it restarted last night. Denies N/V/D, fevers, chills, pain of any sort, recent travel, chemotherapy use and denies any blood clots from the urine. 89M w/ PMH A fib on coumadin, CAD s/p CABG and PPM, HTN, HLD, and DM II who presents with hematuria since last night. Of note patient was seen in the ED on 9/8/19 for similar complaints was treated with discharged home with course of Keflex and 1 dose of vitamin K for INR of 3.29. Pt endorses that he never followed up w/ urology after the last visit and the hematuria at that time had resolved on it's own. He states it restarted last night. Denies N/V/D, fevers, chills, pain of any sort, recent travel, chemotherapy use and denies any blood clots from the urine.

## 2019-09-29 NOTE — ED PROVIDER NOTE - NSFOLLOWUPINSTRUCTIONS_ED_ALL_ED_FT
You are to follow-up in the next 1-2 weeks with [facility] Division of Urology at [facility]. Please call [phone] for an appointment. Or you may see your own private urologist.    Please return to the ED if you develop any signs of fatigue, lightheadedness, syncope or if the blood in the urine increases or starts causing you pain.

## 2019-09-29 NOTE — ED ADULT NURSE REASSESSMENT NOTE - NS ED NURSE REASSESS COMMENT FT1
pt voided in urinal and specimen was collected.  urine was clear and yellow in color.  post void bladder scan= 166 cc fluid.  will continue to monitor.

## 2019-09-29 NOTE — ED PROCEDURE NOTE - PROCEDURE ADDITIONAL DETAILS
Emergency Department Focused Ultrasound performed at patient's bedside for educational purposes. The study will have a follow up study performed

## 2019-09-29 NOTE — ED PROVIDER NOTE - ATTENDING CONTRIBUTION TO CARE
89yo male pmh afib on coumadin CAD s/p CABG HTN HLD p/w hematuria, no clots. Similar complaint 3w ago discharged with keflex and vitamin K for elevated INR. CT with nonspecific bilateral perinephric stranding. Denies fevers, n/v/d, dysuria, abd pain. Abd soft nontender, no CVAT. 89yo male pmh afib on coumadin CAD s/p CABG HTN HLD p/w hematuria, no clots. Similar complaint 3w ago discharged with keflex and vitamin K for elevated INR. CT with nonspecific bilateral perinephric stranding. Denies fevers, n/v/d, dysuria, abd pain. Abd soft nontender, no CVAT. Also c/o mild perineum discomfort yesterday that resolved. No skin changes in color, induration, fluctuance or crepitus of the perineum on exam

## 2019-09-29 NOTE — ED ADULT NURSE NOTE - OBJECTIVE STATEMENT
89 y/o male with pmhx of htn, double bypass and L PPM currently taking coumadin c/o sudden onset of hematuria that he noticed last night.  pt denies any fever, chills, burning during urination or dysuria at this time.  pt states that he was tx with abx of UTI approx 2 weeks ago.  pt is awake, alert and responsive to all stimuli.  no sob or respiratory distress noted.  vss.  pt is afebrile.  safety precautions in place.  family at bedside.  will continue to monitor.

## 2019-09-29 NOTE — ED ADULT NURSE NOTE - PSH
Artificial pacemaker  St [employer] Model #[id number] Serial #[id number]  Breast cyst  bilateral around 50 years ago  S/P CABG (coronary artery bypass graft)  x2 2013  S/P heart valve repair  mitral valve repair with annuloplasty ring 2013  S/P small bowel resection  due to perforated intestine  during colonoscopy around 2000  Stented coronary artery  1996 x1

## 2019-09-29 NOTE — ED PROVIDER NOTE - PSH
Artificial pacemaker  [employer] Model #[id number] Serial #[id number]  Breast cyst  bilateral around 50 years ago  S/P CABG (coronary artery bypass graft)  x2 2013  S/P heart valve repair  mitral valve repair with annuloplasty ring 2013  S/P small bowel resection  due to perforated intestine  during colonoscopy around 2000  Stented coronary artery  1996 x1

## 2019-09-29 NOTE — ED PROVIDER NOTE - CLINICAL SUMMARY MEDICAL DECISION MAKING FREE TEXT BOX
89yo M w/ hematuria. Will check labs and urine; low suspicion for any significant hemorrhage. Likely will d/c w/ urology follow up.

## 2019-09-29 NOTE — ED PROVIDER NOTE - PATIENT PORTAL LINK FT
You can access the FollowMyHealth Patient Portal offered by [facility] by registering at the following website: [email or web address]. By joining [employer]’s FollowMyHealth portal, you will also be able to view your health information using other applications (apps) compatible with our system.

## 2019-10-20 NOTE — ED ADULT NURSE NOTE - OBJECTIVE STATEMENT
89 yo male came to ED complaining of "hearing/feeling his pacemaker do something its never done" Pt denies SOB chest pain, fever, dizziness, lightheadedness. Pt states was napping this afternoon and felt something around 1500. Pacemaker has been in for 10 years and hes never felt anything like this so he came to the ED to get it "checked out". PMH of Piagents disease, HTN. PMH pacemaker 2009, heart monitor and stent placement. VSS. EKG shows 3-6 PVC. Pacemaker spike is seen on EKG monitor at patients bedside. Pt. is resting comfortably, no visual distress or pain noted. Daughter at the bed side. Pt is on coumadin and get INR checked every tuesday/ weekly. Pt in yellow gown and educated to call for help if needing assistance to get out of bed. Daughter and patient verbalized understanding. 89 yo male came to ED complaining of "hearing/feeling his pacemaker do something its never done" Pt denies SOB chest pain, fever, dizziness, lightheadedness. Pt states was napping this afternoon and felt something around 1500. Pacemaker has been in for 10 years and hes never felt anything like this so he came to the ED to get it "checked out". PMH of Piagents disease, HTN. PMH pacemaker 2009, heart monitor and stent placement. VSS. EKG shows 3-6 PVC. Pacemaker spike is seen on EKG monitor at patients bedside. Pt. is resting comfortably, no visual distress or pain noted. Daughter at the bed side. Pt is on coumadin and get INR checked every tuesday/ weekly. Pt in yellow gown and educated to call for help if needing assistance to get out of bed. Daughter and patient verbalized understanding. Repeat troponin done at 1840. Patient is resting comfortably , waiting for results and discharge.

## 2019-10-20 NOTE — ED PROVIDER NOTE - NSFOLLOWUPINSTRUCTIONS_ED_ALL_ED_FT
Please follow up with your CARDIOLOGIST in the next 3-5 days. Your diagnosis: pacemaker issue    Discharge instructions:    - Please follow up with your cardiologist in 3-5 days.    - Be sure to return to the ED if you develop new or worsening symptoms. Specific signs and symptoms to be vigilant of: fever or chills, chest pain, difficulty breathing, palpitations, loss of consciousness, headache, vision changes, slurred speech, difficulty swallowing or drooling, facial droop, weakness in the arms or legs, numbness or tingling, abdominal pain, nausea or vomiting, diarrhea, constipation, blood in the stool or urine, pain on urination, difficulty urinating.

## 2019-10-20 NOTE — ED PROVIDER NOTE - PATIENT PORTAL LINK FT
You can access the FollowMyHealth Patient Portal offered by [facility] by registering at the following website: [email or web address]. By joining [email or web address]’s FollowMyHealth portal, you will also be able to view your health information using other applications (apps) compatible with our system. You can access the FollowMyHealth Patient Portal offered by [facility] by registering at the following website: [email or web address]. By joining [employer]’s FollowMyHealth portal, you will also be able to view your health information using other applications (apps) compatible with our system.

## 2019-10-20 NOTE — ED PROVIDER NOTE - PHYSICAL EXAMINATION
*GEN:   comfortable, in no acute distress, AOx3  *EYES:   pupils equally round and reactive to light, extra-occular movements intact  *HEENT:   airway patent, moist mucosal membranes, full ROM neck  *CV:   regular rate and rhythm  *RESP:   clear to auscultation bilaterally, non-labored  *ABD:   soft, non-tender  *:   no cva/flank tenderness  *EXTREM:   no MSK tenderness, full ROM throughout, no leg swelling  *SKIN:   dry, intact  *NEURO:   AOx3, no focal weakness or loss of sensation

## 2019-10-20 NOTE — ED PROVIDER NOTE - PROGRESS NOTE DETAILS
Sina Mendez PGY3: PPM interrogated by dr. howard - no cardiac events, battery life wnl, will dc w/ outpt cards f/u Damian: patient endorsed to me in sign-out. 2nd trop reassuring. Stable for discharge with outpatient cardiology follow up.

## 2019-10-20 NOTE — ED PROVIDER NOTE - CLINICAL SUMMARY MEDICAL DECISION MAKING FREE TEXT BOX
well appearing 89M w/ one episode of 'sensation of pacemaker' - will check cardiac workup and obs on monitor for possible ACS, arrhythmia, reassess

## 2019-10-20 NOTE — ED ADULT NURSE REASSESSMENT NOTE - NS ED NURSE REASSESS COMMENT FT1
Report received from HERLINDA Shrestha. Pt is breathing unlabored on RA. MD Salgado made aware pt BP and states pt ready for d/c, no interventions required. Report received from HERLINDA Galvin. Pt is breathing unlabored on RA. MD Salgado made aware pt BP and states pt ready for d/c, no interventions required.

## 2019-10-20 NOTE — ED PROVIDER NOTE - OBJECTIVE STATEMENT
89M w/ pmh A fib on coumadin, CAD s/p CABG and PPM, HTN, HLD, and DM II -- p/w 'irregular motion of pacemaker'. Patient woke up from nap 3pm today and felt that sensation, lasted one second and then resolved. Denies any other symptoms today at all. No fever, n/v/d/c, chest / abd pain, cough, dizziness, dysuria/hematuria. No recent travel, medication change, illness, or hospitalization.     Has PPM: placed 2/19/14, [employer] [id number], #[id number]    Cards: Anh

## 2019-10-24 PROBLEM — Z87.438 HISTORY OF PHIMOSIS OF PENIS: Status: RESOLVED | Noted: 2018-01-25 | Resolved: 2019-01-01

## 2019-10-24 NOTE — ASSESSMENT
[FreeTextEntry1] : We discussed the difference between microscopic and gross hematuria. Potential benign and malignant urological conditions that can cause hematuria were reviewed. Workup including renal imaging with ultrasonography or CT scan, urine studies and cystoscopy was reviewed. Risks of cystoscopy were discussed. Patient's questions were answered. He decided to proceed with cystoscopy. Also discussed with his daughter.\par \par Cody Holland MD, FACS\par [facility]\par  of Urology\par \par [address], [address]\[place] [address]\par \par [address], [address]\[place] [address]\par \par Tel: [phone]\par Fax: [phone]

## 2019-10-24 NOTE — HISTORY OF PRESENT ILLNESS
[FreeTextEntry1] : He is a 89-year-old who is seen today in follow up. He underwent circumcision in 2018. He usually has nocturia 3-4 times but recently had blood in his urine. He is on Coumadin and INR was 3.9. He went to the emergency room. Urinalysis showed blood and urine culture was negative. CT scan in September 2019 showed bilateral renal cysts with left-sided cyst which had calcification but smaller than before. Creatinine was 1.04. He is on Flomax. Residual urine today was 170 cc.

## 2019-10-24 NOTE — LETTER BODY
[Dear  ___] : Dear  [unfilled], [Attached please find my note.] : Attached please find my note. [Thank you very much for allowing me to participate in the care of this patient. If you have any questions, please do not hesitate to contact me] : Thank you very much for allowing me to participate in the care of this patient. If you have any questions, please do not hesitate to contact me. [FreeTextEntry1] : ACP\par [address] \par [place], [place], [address]  \par [phone] \par

## 2019-10-24 NOTE — PHYSICAL EXAM
[General Appearance - Well Developed] : well developed [Normal Appearance] : normal appearance [General Appearance - Well Nourished] : well nourished [Well Groomed] : well groomed [General Appearance - In No Acute Distress] : no acute distress [Abdomen Tenderness] : non-tender [Abdomen Soft] : soft [Costovertebral Angle Tenderness] : no ~M costovertebral angle tenderness [Urethral Meatus] : meatus normal [Penis Abnormality] : normal circumcised penis [Urinary Bladder Findings] : the bladder was normal on palpation [Scrotum] : the scrotum was normal [Testes Tenderness] : no tenderness of the testes [Testes Mass (___cm)] : there were no testicular masses [] : no respiratory distress [Exaggerated Use Of Accessory Muscles For Inspiration] : no accessory muscle use [Respiration, Rhythm And Depth] : normal respiratory rhythm and effort [Affect] : the affect was normal [Oriented To Time, Place, And Person] : oriented to person, place, and time [Mood] : the mood was normal [Not Anxious] : not anxious

## 2019-11-28 NOTE — H&P ADULT - NSICDXPASTMEDICALHX_GEN_ALL_CORE_FT
PAST MEDICAL HISTORY:  Atrial fibrillation     BPH (benign prostatic hyperplasia)     CAD (coronary artery disease) x1 stent 1996  S/P CABGx2 2005    Cataract     GERD (gastroesophageal reflux disease)     Glaucoma     HLD (hyperlipidemia)     HTN (hypertension)     Phimosis     Seizure age 9, had 1 seizure, s/p fall    Valvular heart disease S/p mitral valve repair

## 2019-11-28 NOTE — ED PROVIDER NOTE - PSH
Artificial pacemaker  St "[employer] Model #[id number] Serial #[id number]  Breast cyst  bilateral around 50 years ago  S/P CABG (coronary artery bypass graft)  x2 2013  S/P heart valve repair  mitral valve repair with annuloplasty ring 2013  S/P small bowel resection  due to perforated intestine  during colonoscopy around 2000  Stented coronary artery  1996 x1

## 2019-11-28 NOTE — ED PROVIDER NOTE - CLINICAL SUMMARY MEDICAL DECISION MAKING FREE TEXT BOX
89M, hx of afib on coumadin, CAD s/p CABG, PPM, HTN HLD, BPH, DM2 presents w chief complaint of urinary retention / eng not working. Eng may be dislodged vs clogged. Will check basic labs, discuss with urology. Will either require CBI and/or flush eng and/or eng swap. Currently stable, no acute distress. Will continue to follow up and re-assess. Case discussed with Attending.   Ru Meraz MD, PGY3 Emergency Medicine

## 2019-11-28 NOTE — H&P ADULT - NSHPPHYSICALEXAM_GEN_ALL_CORE
Gen NAD  Pulm no accessory muscle use  Card no peripheral edema  Abd soft NT ND   3 way eng draining clear pink urine on slow drip CBI  Extrem WWP  Heme no bruising

## 2019-11-28 NOTE — H&P ADULT - ASSESSMENT
89 year old man with hematuria s/p office cystoscopy.    - Admit to Dr Holland  - Cont CBI on slow drip  - Holding coumadin  - CBC in the am  - Home meds  - Regular diet  - SQH  - Ancef for  manipulation

## 2019-11-28 NOTE — ED ADULT TRIAGE NOTE - CHIEF COMPLAINT QUOTE
Correa catheter was placed yesterday and is clogged Correa catheter was placed yesterday and is clogged, no urine

## 2019-11-28 NOTE — ED PROVIDER NOTE - PROGRESS NOTE DETAILS
CBI performed by Urology, bag now running clear. Urology requesting we clamp CBI fluids, monitor UOP. Requesting repeat CBC at 12pm  Ru Meraz MD, PGY3 Emergency Medicine Drop in Hgb, ongoing hematuria s/p CBI - urology advised admit to their service for Hgb checks, monitoring CBI/UOP  Ru Meraz MD, PGY3 Emergency Medicine

## 2019-11-28 NOTE — H&P ADULT - ATTENDING COMMENTS
Patient was seen and examined. Above events noted. Off CBI, urine is light pink. Most likely will discharge with Correa catheter for trial of void as outpatient.

## 2019-11-28 NOTE — H&P ADULT - HISTORY OF PRESENT ILLNESS
89 year old man with history of afib on coumadin, CAD s/p CABG, PPM, HTN HLD, BPH, DM2 and gross hematuria s/p cystoscopy in office on 11/27 complicated by acute urinary retention s/p catheter placement in office later that day. Presents with hematuria and decreased urine output from eng. Denies pain, dysuria, fevers/chills, nausea/vomiting, lightheadedness. Found to be in clot retention in ED. Eng removed and 6-eye catheter used to irrigate 2-300cc clot and approximately 400cc bloody urine. 6 eye exchanged for 22F 3 way catheter for CBI. Urine color did improve but patient found to have Hct drop, admitted for continued CBI and hemodynamic monitoring.     Of note, coumadin has been on hold since the procedure yesterday.

## 2019-11-28 NOTE — ED ADULT NURSE NOTE - OBJECTIVE STATEMENT
89 y/o male a+ox3, pmhx phimosis, seizure, CAD, BPH, HLD, HTN, afib, coming from home for no urine flow out of eng. Pt had indwelling urinary eng placed at [facility] yesterday for urinary retention; at 0100 this morning, pt noted his eng bag was not draining urine. Pt c/o mild "fullness" in his suprapubic region; denies abdominal pain, n/v/d, fever or chills, bleeding from eng site, chest pain or discomfort, headache, lightheadedness, dizziness, difficulty breathing. Pt left in position of comfort, guard rails up, bed low and locked; family at bedside, will reassess 89 y/o male a+ox3, pmhx phimosis, seizure, CAD, BPH, HLD, HTN, afib, coming from home for no urine flow out of eng. Pt had indwelling urinary eng placed at outpatient Urologist office yesterday for urinary retention; at 0100 this morning, pt noted his eng bag was not draining urine. Assessment found blood on tip penis and eng tubing with 10-25cc of blood tinged urine in eng bag. Pt states his urine was always bloody "after eng was place." Pt c/o mild "fullness" in his suprapubic region; denies abdominal pain, n/v/d, fever or chills, chest pain or discomfort, headache, lightheadedness, dizziness, difficulty breathing. Pt left in position of comfort, guard rails up, bed low and locked; family at bedside, will reassess 89 y/o male a+ox3, pmhx phimosis, seizure, CAD, BPH, HLD, HTN, afib on coumadin, coming from home for no urine flow out of eng. Pt had indwelling urinary eng placed at outpatient Urologist office yesterday for urinary retention; at 0100 this morning, pt noted his eng bag was not draining urine. Assessment found blood on tip penis and eng tubing with 10-25cc of blood tinged urine in eng bag. Pt states his urine was always bloody "after eng was place." Pt c/o mild "fullness" in his suprapubic region; denies abdominal pain, n/v/d, fever or chills, chest pain or discomfort, headache, lightheadedness, dizziness, difficulty breathing. Pt left in position of comfort, guard rails up, bed low and locked; family at bedside, will reassess

## 2019-11-28 NOTE — H&P ADULT - NSHPLABSRESULTS_GEN_ALL_CORE
Vital Signs Last 24 Hrs  T(C): 36.7 (2019 07:31), Max: 36.7 (2019 07:31)  T(F): 98 (2019 07:31), Max: 98 (2019 07:31)  HR: 62 (2019 10:10) (62 - 89)  BP: 133/67 (2019 10:10) (133/67 - 150/81)  BP(mean): --  RR: 16 (2019 10:10) (16 - 18)  SpO2: 99% (2019 10:10) (99% - 99%)    I&O's Detail                            10.0   11.90 )-----------( 105      ( 2019 12:27 )             29.6       11-28    133<L>  |  93<L>  |  17  ----------------------------<  158<H>  4.2   |  23  |  0.93    Ca    9.4      2019 08:52    TPro  6.7  /  Alb  4.2  /  TBili  1.5<H>  /  DBili  x   /  AST  22  /  ALT  16  /  AlkPhos  190<H>  11-28      CAPILLARY BLOOD GLUCOSE          LIVER FUNCTIONS - ( 2019 08:52 )  Alb: 4.2 g/dL / Pro: 6.7 g/dL / ALK PHOS: 190 U/L / ALT: 16 U/L / AST: 22 U/L / GGT: x             PT/INR - ( 2019 08:52 )   PT: 26.7 sec;   INR: 2.28 ratio         PTT - ( 2019 08:52 )  PTT:38.4 sec    Urinalysis Basic - ( 2019 13:18 )    Color: Red / Appearance: Slightly Turbid / S.014 / pH: x  Gluc: x / Ketone: SEE NOTE  / Bili: SEE NOTE / Urobili: SEE NOTE   Blood: x / Protein: SEE NOTE / Nitrite: SEE NOTE   Leuk Esterase: SEE NOTE / RBC: >50 /hpf / WBC 10 /HPF   Sq Epi: x / Non Sq Epi: 0 /hpf / Bacteria: 0.0

## 2019-11-28 NOTE — ED PROVIDER NOTE - OBJECTIVE STATEMENT
89M, hx of afib on coumadin, CAD s/p CABG, PPM, HTN HLD, BPH, DM2 presents w chief complaint of urinary retention / eng not working. Patient states he was seen at [facility] yesterday and had eng placed due to retention. Last night, he emptied urinary bag around 11:30pm, and then it no longer drained after around 1am. He also noted hematuria in eng bag and blood around meatus. Reports no UOP since 1am. +suprapubic fullness. No headache, dizziness, lightheadedness, blurry vision, chest pain, shortness of breath, abdominal pain, nausea or vomiting. Meds, allergies per charting.

## 2019-11-28 NOTE — ED PROVIDER NOTE - ATTENDING CONTRIBUTION TO CARE
Private Physician [facility]  89y male pmh BPH with retention tx  yesterday,  Afib on ac, Cad, sp cabg, PPM,HTN,HLD,DMT2,comes to ed complains of bleeding from around cath and urinary cath blockage since last nigh with sensation of baldder fullness. No fever chill,sob, nvdc,cp. PE WDWN male awake alert normocephalic atraumatic neck supple abd suprapubic fullness, no rebound guarding, Gu eng in place with blood around meatus. neuro grossly intact  Steven Dela Cruz MD, Facep

## 2019-11-28 NOTE — H&P ADULT - NSICDXPASTSURGICALHX_GEN_ALL_CORE_FT
PAST SURGICAL HISTORY:  Artificial pacemaker [employer] Model #[id number] Serial #[id number]    Breast cyst bilateral around 50 years ago    S/P CABG (coronary artery bypass graft) x2 2013    S/P heart valve repair mitral valve repair with annuloplasty ring 2013    S/P small bowel resection due to perforated intestine  during colonoscopy around 2000    Stented coronary artery 1996 x1

## 2019-11-28 NOTE — ED PROVIDER NOTE - ABDOMINAL EXAM
nontender.../soft/distended and firm to suprapubic. No tenderness to palpation to other areas of abdomen.

## 2019-11-28 NOTE — ED PROVIDER NOTE - GASTROINTESTINAL NEGATIVE STATEMENT, MLM
no abdominal pain, no bloating, no constipation, no diarrhea, no nausea and no vomiting. Suprapubic fullness

## 2019-11-29 NOTE — DISCHARGE NOTE PROVIDER - HOSPITAL COURSE
This is a 89 year old male who was admitted for hematuria.  He remained hemodynamically stable throughout his admission.  H/H was stable as well.  The patient was started on CBI and his coumadin was held.  The CBI was weaned off, and he was discharged. This is a 89 year old male who was admitted for hematuria.  He remained hemodynamically stable throughout his admission.  H/H was stable as well.  The patient was started on CBI and his coumadin was held.  The CBI was weaned off, and he was discharged with the eng catheter.

## 2019-11-29 NOTE — DISCHARGE NOTE NURSING/CASE MANAGEMENT/SOCIAL WORK - NSDCPNDISPN_GEN_ALL_CORE
Activities of daily living, including home environment that might     exacerbate pain or reduce effectiveness of the pain management plan of care as well as strategies to address these issues/Education provided on the pain management plan of care/Safe use, storage and disposal of opioids when prescribed/Opioids not applicable/not prescribed/Side effects of pain management treatment

## 2019-11-29 NOTE — DISCHARGE NOTE PROVIDER - CARE PROVIDER_API CALL
Cody Holland)  Urology  [address], [address]  [place] [address]  Phone: [phone]  Fax: [phone]  Follow Up Time: Routine

## 2019-11-29 NOTE — PROGRESS NOTE ADULT - SUBJECTIVE AND OBJECTIVE BOX
The patient was seen and examined at bedside.  No acute events overnight.  Denies complaints of chest pain, shortness of breath, nausea, acute pain.  CBI was clamped.    T(C): 36.8 (11-29-19 @ 05:16), Max: 37.1 (11-28-19 @ 17:30)  HR: 60 (11-29-19 @ 05:16) (60 - 89)  BP: 125/65 (11-29-19 @ 05:16) (113/50 - 164/67)  RR: 18 (11-29-19 @ 05:16) (16 - 18)  SpO2: 97% (11-29-19 @ 05:16) (96% - 99%)  Wt(kg): --    Physical Exam:    General: NAD, A+Ox3  Abdomen: soft, non-tender, non-distended  Back: dressing clean/dry/intact      11-28 @ 07:01  -  11-29 @ 07:00  --------------------------------------------------------  IN: 400 mL / OUT: 0 mL / NET: 400 mL    CBI - light pink The patient was seen and examined at bedside.  No acute events overnight.  Denies complaints of chest pain, shortness of breath, nausea, acute pain.  CBI was clamped.    T(C): 36.8 (11-29-19 @ 05:16), Max: 37.1 (11-28-19 @ 17:30)  HR: 60 (11-29-19 @ 05:16) (60 - 89)  BP: 125/65 (11-29-19 @ 05:16) (113/50 - 164/67)  RR: 18 (11-29-19 @ 05:16) (16 - 18)  SpO2: 97% (11-29-19 @ 05:16) (96% - 99%)  Wt(kg): --    Physical Exam:    General: NAD, A+Ox3  Abdomen: soft, non-tender, non-distended        11-28 @ 07:01  -  11-29 @ 07:00  --------------------------------------------------------  IN: 400 mL / OUT: 0 mL / NET: 400 mL    CBI - light pink

## 2019-11-29 NOTE — PROGRESS NOTE ADULT - ASSESSMENT
89 year old male with hematuria s/p office cystoscopy    -CBI clamped, color check  -AM labs  -continue holding aspirin  -keep NPO for possible OR  -OOB, DVT prophylaxis

## 2019-11-29 NOTE — DISCHARGE NOTE NURSING/CASE MANAGEMENT/SOCIAL WORK - NSDCPNINST_GEN_ALL_CORE
instructed pt and family on eng care, safety prevention, importance of maintaining hydrated and to call md for f/u appt.

## 2019-11-29 NOTE — DISCHARGE NOTE PROVIDER - NSDCMRMEDTOKEN_GEN_ALL_CORE_FT
atorvastatin 10 mg oral tablet: 1 tab(s) orally once a day  biotin:   Coumadin 4 mg oral tablet: 1 tab(s) orally once a day  NOTE: on hold per pcp  digoxin 125 mcg (0.125 mg) oral tablet: 1 tab(s) orally every other day  ferrous sulfate:   furosemide 40 mg oral tablet: 1 tab(s) orally once a day  losartan 100 mg oral tablet: 1 tab(s) orally once a day  metoprolol succinate 100 mg oral tablet, extended release: 1 tab(s) orally 2 times a day  tamsulosin 0.4 mg oral capsule: 2 cap(s) orally once a day (at bedtime)  Vitamin D3: atorvastatin 10 mg oral tablet: 1 tab(s) orally once a day  biotin:   Coumadin 4 mg oral tablet: 1 tab(s) orally once a day  NOTE: on hold per pcp  digoxin 125 mcg (0.125 mg) oral tablet: 1 tab(s) orally every other day  ferrous sulfate:   furosemide 40 mg oral tablet: 1 tab(s) orally once a day  Keflex 500 mg oral capsule: 1 cap(s) orally every 12 hours   losartan 100 mg oral tablet: 1 tab(s) orally once a day  metoprolol succinate 100 mg oral tablet, extended release: 1 tab(s) orally 2 times a day  tamsulosin 0.4 mg oral capsule: 2 cap(s) orally once a day (at bedtime)  Vitamin D3:

## 2019-11-29 NOTE — DISCHARGE NOTE NURSING/CASE MANAGEMENT/SOCIAL WORK - PATIENT PORTAL LINK FT
You can access the FollowMyHealth Patient Portal offered by [facility] by registering at the following website: [email or web address]. By joining [facility]’s FollowMyHealth portal, you will also be able to view your health information using other applications (apps) compatible with our system.

## 2019-11-29 NOTE — DISCHARGE NOTE PROVIDER - NSDCCPCAREPLAN_GEN_ALL_CORE_FT
PRINCIPAL DISCHARGE DIAGNOSIS  Diagnosis: Hematuria  Assessment and Plan of Treatment: Follow up with Dr Holland routinely. PRINCIPAL DISCHARGE DIAGNOSIS  Diagnosis: Hematuria  Assessment and Plan of Treatment: Follow up with Dr Holland on Monday for your eng catheter to be removed.      SECONDARY DISCHARGE DIAGNOSES  Diagnosis: HTN (hypertension)  Assessment and Plan of Treatment: Continue metoprolol, losartan, lasix,    Diagnosis: HLD (hyperlipidemia)  Assessment and Plan of Treatment: Continue atorvastatin    Diagnosis: CAD (coronary artery disease)  Assessment and Plan of Treatment: Follow up with your cardiologist regularly.    Diagnosis: Atrial fibrillation  Assessment and Plan of Treatment: COUMADIN PLAN.  Continue digoxin.

## 2019-12-02 PROBLEM — R31.0 GROSS HEMATURIA: Status: ACTIVE | Noted: 2019-01-01

## 2019-12-02 NOTE — LETTER BODY
[Dear  ___] : Dear  [unfilled], [Attached please find my note.] : Attached please find my note. [Thank you very much for allowing me to participate in the care of this patient. If you have any questions, please do not hesitate to contact me] : Thank you very much for allowing me to participate in the care of this patient. If you have any questions, please do not hesitate to contact me. [FreeTextEntry1] : ACP\par [address] \[place], [address]  \par [phone] \par

## 2019-12-02 NOTE — ASSESSMENT
[FreeTextEntry1] : Catheter was removed. Hopefully, he should be able to urinate. Otherwise he'll followup later on today. He will be scheduled in the future for bladder tumor resection.\par \par Cody Holland MD, FACS\par [facility]\par  of Urology\par \par [address], [address]\[place] [address]\par \par [address], [address]\[place] [address]\par \par Tel: [phone]\par Fax: [phone]

## 2019-12-02 NOTE — PHYSICAL EXAM
[General Appearance - Well Developed] : well developed [General Appearance - Well Nourished] : well nourished [Normal Appearance] : normal appearance [Well Groomed] : well groomed [General Appearance - In No Acute Distress] : no acute distress [Abdomen Soft] : soft [Abdomen Tenderness] : non-tender [Costovertebral Angle Tenderness] : no ~M costovertebral angle tenderness [Urethral Meatus] : meatus normal [Penis Abnormality] : normal circumcised penis [Urinary Bladder Findings] : the bladder was normal on palpation [Scrotum] : the scrotum was normal [Testes Tenderness] : no tenderness of the testes [Testes Mass (___cm)] : there were no testicular masses [FreeTextEntry1] : Correa with light pink/red urine

## 2019-12-02 NOTE — HISTORY OF PRESENT ILLNESS
[FreeTextEntry1] : He is a 89-year-old who is seen today in follow up. 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 Subsequently he developed gross hematuria and was hospitalized for bladder irrigation. He is here to remove the catheter. Urine is light pink to light red. He stopped Coumadin. Circumcision was performed in 2018. Usually, nocturia is 3-4 times. CT scan in September 2019 showed bilateral renal cysts with left-sided cyst which had calcification but smaller than before. Creatinine was 1.04. He is on Flomax. Residual urine before was 170 cc.

## 2019-12-04 NOTE — H&P PST ADULT - NSICDXPROBLEM_GEN_ALL_CORE_FT
PROBLEM DIAGNOSES  Problem: Gross hematuria  Assessment and Plan: scheduled for a cystoscopy bipolar TURBT on 12/10/2019 with Dr. Holland.    Problem: Pre-op evaluation  Assessment and Plan: Labs - CBC, BMP, and C/S  Last cardio note with EKG, PPM interrogation, and ECHO on chart   Pre op instructions reviewed and given. Take routine am meds DOS with sip of water. Avoid NSAIDs and OTC supplements. Verbalized understanding     Problem: On anticoagulant therapy  Assessment and Plan: Hold Coumadin for 5 days. PT/INR on admit    Problem: Need for prophylactic measure  Assessment and Plan: The Caprini score indicates that this patient is at high risk for a VTE event (score 6 or greater). Surgical patients in this group will benefit from both pharmacologic prophylaxis and intermittent compression devices.  The surgical team will determine the balance between VTE risk and bleeding risk, and other clinical considerations

## 2019-12-04 NOTE — H&P PST ADULT - HISTORY OF PRESENT ILLNESS
89 yo male with PPM, AFIB on coumadin, CAD with BMS in the LAD from 1994, HTN, HLD, BPH, diet controlled DM, presents to PST scheduled for a cystoscopy bipolar TURBT on 12/10/2019 with Dr. Holland. Reports H/O gross hematuria last week. Was hospitalized overnight for irrigation. F/U cystoscopy with urology showed 2 bladder tumors. Completed abx last week. Reports urgency and frequency. Denies gross hematuria and dysuria.

## 2019-12-04 NOTE — H&P PST ADULT - NSALCOHOLPROBLEMSRELYN_GEN_A_CORE_SD
EDIS contacted Lidia, RN with [facility] to report patient is ready for discharge today and request SNF authorization. EDIS uploaded 142, PASRR, PPD, and facility transfer orders via [facility] to [facility]. EDIS contacted Airam (admissions nurse) with [facility] @ [phone] to report patient ready for discharge and orders available in [facility] for review.         04/16/19 1258   Post-Acute Status   Post-Acute Authorization Placement  (SNF)   Post-Acute Placement Status Pending Payor Review      no

## 2019-12-04 NOTE — H&P PST ADULT - NSICDXPASTMEDICALHX_GEN_ALL_CORE_FT
PAST MEDICAL HISTORY:  Atrial fibrillation     BPH (benign prostatic hyperplasia)     CAD (coronary artery disease) BMS x 1  in the LAD 1995; S/P CABG x2V 2013    Cataract     GERD (gastroesophageal reflux disease)     Glaucoma     HLD (hyperlipidemia)     HTN (hypertension)     Phimosis     Seizure age 9, had 1 seizure, s/p fall    Valvular heart disease S/p mitral valve repair

## 2019-12-04 NOTE — H&P PST ADULT - NS MD HP INPLANTS MED DEV
Pacemaker [employer] Model #[id number] Serial #[id number], Annuloplasty ring(mitral valve)/Pacemaker

## 2019-12-04 NOTE — H&P PST ADULT - NSICDXPASTSURGICALHX_GEN_ALL_CORE_FT
PAST SURGICAL HISTORY:  Artificial pacemaker [employer] Model #[id number] Serial #[id number]    Breast cyst bilateral around 50 years ago    H/O circumcision 2018    S/P CABG (coronary artery bypass graft) x2 vessels 2013    S/P heart valve repair mitral valve repair with annuloplasty ring 2013    S/P small bowel resection due to perforated intestine  during colonoscopy around 2000    Stented coronary artery 1995

## 2019-12-04 NOTE — H&P PST ADULT - ASSESSMENT
89 yo male with gross hematuria scheduled for a cystoscopy bipolar TURBT on 12/10/2019 with Dr. Holland.    CAPRINI SCORE [CLOT]    AGE RELATED RISK FACTORS                                                       MOBILITY RELATED FACTORS  [ ] Age 41-60 years                                            (1 Point)                  [ ] Bed rest                                                        (1 Point)  [ ] Age: 61-74 years                                           (2 Points)                 [ ] Plaster cast                                                   (2 Points)  [ x] Age= 75 years                                              (3 Points)                 [ ] Bed bound for more than 72 hours                 (2 Points)    DISEASE RELATED RISK FACTORS                                               GENDER SPECIFIC FACTORS  [ x] Edema in the lower extremities                       (1 Point)                  [ ] Pregnancy                                                     (1 Point)  [ ] Varicose veins                                               (1 Point)                  [ ] Post-partum < 6 weeks                                   (1 Point)             [x ] BMI > 25 Kg/m2                                            (1 Point)                  [ ] Hormonal therapy  or oral contraception          (1 Point)                 [ ] Sepsis (in the previous month)                        (1 Point)                  [ ] History of pregnancy complications                 (1 point)  [ ] Pneumonia or serious lung disease                                               [ ] Unexplained or recurrent                     (1 Point)           (in the previous month)                               (1 Point)  [ ] Abnormal pulmonary function test                     (1 Point)                 SURGERY RELATED RISK FACTORS  [ ] Acute myocardial infarction                              (1 Point)                 [ ]  Section                                             (1 Point)  [ ] Congestive heart failure (in the previous month)  (1 Point)               [ ] Minor surgery                                                  (1 Point)   [ ] Inflammatory bowel disease                             (1 Point)                 [ ] Arthroscopic surgery                                        (2 Points)  [ ] Central venous access                                      (2 Points)                [ x] General surgery lasting more than 45 minutes   (2 Points)       [ ] Stroke (in the previous month)                          (5 Points)               [ ] Elective arthroplasty                                         (5 Points)                                                                                                                                               HEMATOLOGY RELATED FACTORS                                                 TRAUMA RELATED RISK FACTORS  [ ] Prior episodes of VTE                                     (3 Points)                [ ] Fracture of the hip, pelvis, or leg                       (5 Points)  [ ] Positive family history for VTE                         (3 Points)                 [ ] Acute spinal cord injury (in the previous month)  (5 Points)  [ ] Prothrombin 63781 A                                     (3 Points)                 [ ] Paralysis  (less than 1 month)                             (5 Points)  [ ] Factor V Leiden                                             (3 Points)                  [ ] Multiple Trauma within 1 month                        (5 Points)  [ ] Lupus anticoagulants                                     (3 Points)                                                           [ ] Anticardiolipin antibodies                               (3 Points)                                                       [ ] High homocysteine in the blood                      (3 Points)                                             [ ] Other congenital or acquired thrombophilia      (3 Points)                                                [ ] Heparin induced thrombocytopenia                  (3 Points)                                          Total Score [      7    ]    Caprini Score 0 - 2:  Low Risk, No VTE Prophylaxis required for most patients, encourage ambulation  Caprini Score 3 - 6:  At Risk, pharmacologic VTE prophylaxis is indicated for most patients (in the absence of a contraindication)  Caprini Score Greater than or = 7:  High Risk, pharmacologic VTE prophylaxis is indicated for most patients (in the absence of a contraindication)

## 2019-12-05 NOTE — ED PROVIDER NOTE - PSH
Artificial pacemaker  [employer] Model #[id number] Serial #[id number]  Breast cyst  bilateral around 50 years ago  H/O circumcision  2018  S/P CABG (coronary artery bypass graft)  x2 vessels 2013  S/P heart valve repair  mitral valve repair with annuloplasty ring 2013  S/P small bowel resection  due to perforated intestine  during colonoscopy around 2000  Stented coronary artery  1995

## 2019-12-05 NOTE — ED PROVIDER NOTE - PATIENT PORTAL LINK FT
You can access the FollowMyHealth Patient Portal offered by [facility] by registering at the following website: [email or web address]. By joining [employer]’s FollowMyHealth portal, you will also be able to view your health information using other applications (apps) compatible with our system.

## 2019-12-05 NOTE — PROCEDURE NOTE - ADDITIONAL PROCEDURE DETAILS
Balloon was deflated and the 16Fr eng catheter was removed.  Using aseptic technique a new 20Fr six-eye catheter was placed.  Bladder irrigated, which returned 30-40 cc of clot.  Flushed until urine was clear.  Then using aseptic technique a 22fr silicone three-way catheter was placed and patient place on slow drip CBI. Urine remained clear.

## 2019-12-05 NOTE — CONSULT NOTE ADULT - SUBJECTIVE AND OBJECTIVE BOX
HPI:  Patient is a 89 year old man with pmhx of afib on coumadin, CAD s/p CABG, PPM, HTN HLD, BPH, DM2 and gross hematuria who presented to the ED with urinary retention since yesterday afternoon.  Patient underwent cystoscopy in the office on  complicated by acute urinary retention s/p catheter placement in office later that day. He was admitted - for clot retention and underwent CBI. He was discharged home with eng which was then removed in the office on .  He is scheduled for TURBT on 12/10 with Dr. Holland. He now presents today to ED stating he has not urinated fully since yesterday and this morning was able to pass very small amount of bloody urine.  He was in pain and felt the need to urinate but was unable. In the ED a regular 16fr eng catheter was placed with dark red urine returned, 700 cc drained and patient's pain resolved.  He was flushed by ED staff with some resolution of hematuria.  Urology was called and when I saw him at bedside he was comfortable in stretcher. Denies chest pain, shortness of breath, nausea, vomiting.       PAST MEDICAL & SURGICAL HISTORY:  Phimosis  Seizure: age 9, had 1 seizure, s/p fall  GERD (gastroesophageal reflux disease)  Valvular heart disease: S/p mitral valve repair  CAD (coronary artery disease): BMS x 1  in the LAD ; S/P CABG x2V   BPH (benign prostatic hyperplasia)  HLD (hyperlipidemia)  HTN (hypertension)  Atrial fibrillation  Cataract  Glaucoma  H/O circumcision:   Artificial pacemaker: [employer] Model #[id number] Serial #[id number]  S/P heart valve repair: mitral valve repair with annuloplasty ring   S/P CABG (coronary artery bypass graft): x2 vessels   S/P small bowel resection: due to perforated intestine  during colonoscopy around   Breast cyst: bilateral around 50 years ago  Stented coronary artery:     FAMILY HISTORY:  Family history of ischemic heart disease    SOCIAL HISTORY:   Tobacco hx:  MEDICATIONS  (STANDING):  ceFAZolin   IVPB 1000 milliGRAM(s) IV Intermittent once    MEDICATIONS  (PRN):    Allergies    No Known Allergies    Intolerances        REVIEW OF SYSTEMS: Pertinent positives and negatives as stated in HPI, otherwise negative    Vital signs  T(C): 36.6 (19 @ 11:37), Max: 36.7 (19 @ 08:52)  HR: 112 (19 @ 11:37)  BP: 174/103 (19 @ 11:37)  SpO2: 96% (19 @ 11:37)  Wt(kg): --    Output      Physical Exam  Gen: NAD  Pulm: No respiratory distress, no subcostal retractions  CV: RRR, no JVD  Abd: Soft, NT, ND  : Uncircumcised, no lesions.  No discharge or blood at urethral meatus.  Testes descended bilaterally.  Testes and epididymis nontender bilaterally.    MSK: No edema present    LABS:         @ 09:00    WBC 7.94  / Hct 30.0  / SCr 0.99      @ 22:58    WBC 7.66  / Hct 31.1  / SCr --           136  |  100  |  15  ----------------------------<  152<H>  4.8   |  25  |  0.99    Ca    9.5      05 Dec 2019 09:00    TPro  6.8  /  Alb  3.9  /  TBili  0.6  /  DBili  x   /  AST  29  /  ALT  24  /  AlkPhos  179<H>  1205    PT/INR - ( 05 Dec 2019 10:01 )   PT: 15.1 sec;   INR: 1.31 ratio         PTT - ( 05 Dec 2019 10:01 )  PTT:35.0 sec  Urinalysis Basic - ( 05 Dec 2019 09:08 )    Color: Red / Appearance: Turbid / S.020 / pH: x  Gluc: x / Ketone: Negative  / Bili: Negative / Urobili: Negative   Blood: x / Protein: 300 mg/dL / Nitrite: Negative   Leuk Esterase: Negative / RBC: >50 /hpf / WBC 6-10   Sq Epi: x / Non Sq Epi: 1 / Bacteria: Moderate

## 2019-12-05 NOTE — ED PROVIDER NOTE - ATTENDING CONTRIBUTION TO CARE
Attending MD Dejesus:  I personally have seen and examined this patient.  Resident note reviewed and agree on plan of care and except where noted.

## 2019-12-05 NOTE — ED PROVIDER NOTE - NSFOLLOWUPINSTRUCTIONS_ED_ALL_ED_FT
Urinary Retention    Urinary retention is the inability to completely empty your bladder. This is a common problem in older men, especially with enlarged prostates. If you are sent home with a eng catheter and a drainage system make sure to keep the drainage bag emptied and lower than your catheter. Keep the eng catheter in until you follow up with a urologist.    SEEK IMMEDIATE MEDICAL CARE IF YOU DEVELOP THE FOLLOWING SYMPTOMS: the catheter stops draining urine, the catheter falls out, abdominal pain, nausea/vomiting, or chills/fever.     1) Follow up with your doctor next week  2) Return to the ED immediately for new or worsening symptoms   3) Please continue to take any home medications as prescribed  4) Your test results from your ED visit were discussed with you prior to discharge  5) You were provided with a copy of your test results  6) Please  your antibiotics at the pharmacy and take as directed  7) Please do not take your coumadin until directed to restart it by your urologist

## 2019-12-05 NOTE — ED PROVIDER NOTE - PROGRESS NOTE DETAILS
Dr. Jose Davis, PGY-2: urine began to clear, now light pink. Per uro pt does not require additional CBI or further uro workup at this time. They recommended keflex 500 BID for 3 days as well as holding coumadin until his surgery on 12/10. Correa to remain in place until surgery. Abx sent to pharmacy. Informed pt and daughter of this plan. Return precautions provided, pt and daughter verbalized understanding. Ready for DC.

## 2019-12-05 NOTE — CONSULT NOTE ADULT - ASSESSMENT
89 year old man with pmhx of afib on coumadin, CAD s/p CABG, PPM, HTN HLD, BPH, DM2,  s/p cystoscopy in the office on 11/27 complicated by clot retention.  Now s/p eng removal in office on 12/2.  Returns today with urinary retention and hematuria.  Eng was placed in ED which returned 700cc dark red urine.  Nursing staff flushed eng with some resolution of hematuria.  Later, a six-eye eng was placed and flushed which removed about 30-40cc of clot.  A 3-way catheter was placed and patient was placed on slow drip CBI.    CBI was on for ~1hr and urine remained clear.  CBI was clamped, if urine remains clear he may be discharged home later today with eng in place.  Ancef 1g once  Full recs to follow  Discussed case with Dr. Holland 89 year old man with pmhx of afib on coumadin, CAD s/p CABG, PPM, HTN HLD, BPH, DM2,  s/p cystoscopy in the office on 11/27 complicated by clot retention.  Now s/p eng removal in office on 12/2.  Returns today with urinary retention and hematuria.  Eng was placed in ED which returned 700cc dark red urine.  Nursing staff flushed eng with some resolution of hematuria.  Later, a six-eye eng was placed and flushed which removed about 30-40cc of clot.  A 3-way catheter was placed and patient was placed on slow drip CBI.    CBI was on for ~1hr and urine remained clear.  CBI was clamped, if urine remains clear he may be discharged home later today with eng in place.  Ancef 1g once  Discussed case with Dr. Holland    Update:    Urine remained clear after CBI was clamped for 2 hours.  May discharge home with eng and leg bag in place.   Recommend Keflex 500mg BID x 3 days  Hold Coumadin until after surgery.   Patient is scheduled for surgery 12/10 with Dr. Holland.   Return to care if develops fever greater than 100.4, nausea/vomiting, or eng ceases to drain.  Discussed case with Dr. Holland 89 year old man with pmhx of afib on coumadin, CAD s/p CABG, PPM, HTN HLD, BPH, DM2,  s/p cystoscopy in the office on 11/27 complicated by clot retention.  Now s/p eng removal in office on 12/2.  Returns today with urinary retention and hematuria.  Eng was placed in ED which returned 700cc dark red urine.  Nursing staff flushed eng with some resolution of hematuria.  Later, a six-eye eng was placed and flushed which removed about 30-40cc of clot.  A 3-way catheter was placed and patient was placed on slow drip CBI.    CBI was on for ~1hr and urine remained clear.  CBI was clamped, if urine remains clear he may be discharged home later today with eng in place.  Ancef 1g once  Discussed case with Dr. Holland    Update:    Urine remained clear after CBI was clamped for 2 hours.  May discharge home with eng and leg bag in place.   Recommend Keflex 500mg BID x 3 days  Encourage oral hydration  Hold Coumadin until after surgery  Patient is scheduled for surgery 12/10 with Dr. Holland.   Return to care if develops fever greater than 100.4, nausea/vomiting, or eng ceases to drain  Discussed case with Dr. Holland

## 2019-12-05 NOTE — ED PROVIDER NOTE - PHYSICAL EXAMINATION
Gen: NAD, AOx3, able to make needs known, non-toxic  Head: NCAT  HEENT: EOMI, oral mucosa moist, normal conjunctiva  Lung: CTAB, no respiratory distress, no wheezes/rhonchi/rales B/L, speaking in full sentences  CV: RRR, no murmurs  Abd: Distended, firm, tender to palpation mainly suprapubic, no guarding, no CVA tenderness  MSK: no visible deformities  Neuro: No focal sensory or motor deficits  Skin: Warm, well perfused  Psych: normal affect

## 2019-12-05 NOTE — ED PROVIDER NOTE - OBJECTIVE STATEMENT
89 y/o M w/ PMH of A fib on coumadin w/ pacemaker, BPH, CAD, GERD, HLD, HTN, MV repair presenting w/ urinary retention. Pt presenting w/ daughter. Presented to this ED end of November w/ urinary retention, had eng catheter placed, and was admitted to urology for CBI and hemodynamic monitoring. Had eng DC'ed in Dr. Holland's (Uro) office on 12/2. Went to presurgical testing yesterday, has planned bladder tumor removal next week. After returning from presurgical testing around 5pm pt began having difficulty urinating. Was only able to pass small amount of what he described as blood. Throughout the night he continued to be unable to urinate and developed abdominal distention. Came to the ED today over concern for recurrence of urinary retention. Denies fevers, chills, headache, dizziness, blurred vision, chest pain, cough, shortness of breath, n/v/d/c, MSK pain, rash.

## 2019-12-05 NOTE — ED PROVIDER NOTE - PMH
Atrial fibrillation    BPH (benign prostatic hyperplasia)    CAD (coronary artery disease)  BMS x 1  in the LAD 1995; S/P CABG x2V 2013  Cataract    GERD (gastroesophageal reflux disease)    Glaucoma    HLD (hyperlipidemia)    HTN (hypertension)    Phimosis    Seizure  age 9, had 1 seizure, s/p fall  Valvular heart disease  S/p mitral valve repair

## 2019-12-05 NOTE — ED PROVIDER NOTE - CLINICAL SUMMARY MEDICAL DECISION MAKING FREE TEXT BOX
89 y/o M w/ PMH of A fib on coumadin w/ pacemaker, BPH, CAD, GERD, HLD, HTN, MV repair presenting w/ urinary retention. Eng placed w/ at least 700 cc of wine colored urine output upon initial placement of eng catheter. Will check labs given hx of being on coumadin and prior admission for drop in H/H. UA and UCx to eval for signs of infection. Uro consult. Reassess the need for additional intervention as clinically indicated. 89 y/o M w/ PMH of A fib on coumadin w/ pacemaker, BPH, CAD, GERD, HLD, HTN, MV repair presenting w/ urinary retention. Eng placed w/ at least 700 cc of wine colored urine output upon initial placement of eng catheter. Will check labs given hx of being on coumadin and prior admission for drop in H/H. UA and UCx to eval for signs of infection. Uro consult. Reassess the need for additional intervention as clinically indicated.    Attending MD Dejesus: 89 y/o M w/ PMH of A fib on coumadin w/ pacemaker, BPH, CAD, GERD, HLD, HTN, MV repair presenting w/ urinary retention. Hx of same in past and most recent catherter removed 12/2.  Patient with difficulty urinating since 5pm last night after returning from [facility] for bladder tumor removal schedule for next week. Upon insertion of eng in ED approximately 700cc of grossly bloody urine removed.  Patient with relief of symptoms.  Will check INR as patient on coumadin.

## 2019-12-05 NOTE — ED ADULT NURSE NOTE - OBJECTIVE STATEMENT
Pt is a 89yo male who c/o of being unable to urinate. Pt had a recent admission with CBI. Blood present around urethra upon assessment. 16Fr Correa placed successfully per order. 800cc of dark red urine drained initially. Pt is AAOx4. VSS. No c/o of discomfort s/p Correa placement.

## 2019-12-05 NOTE — PROCEDURE NOTE - NSURITECHNIQUE_GU_A_CORE
Proper hand hygiene was performed/Sterile gloves were worn for the duration of the procedure/A sterile drape was used to cover all adjacent areas/The catheter was appropriately lubricated/The catheter was secured with a securement device (e.g. StatLock)/The collection bag is below the level of the patient and urinary bladder/The site was cleaned with soap/water and sterile solution (betadine)/The urinary drainage system is closed at the end of the procedure/All applicable medical record documentation is completed

## 2019-12-10 NOTE — ASU DISCHARGE PLAN (ADULT/PEDIATRIC) - ASU DC SPECIAL INSTRUCTIONSFT
You may take Tylenol and Ibuprofen over the counter every 6 hours for pain.     Please follow up with Dr Holland on Thursday. Please call the office to schedule your appointment.     ***Please hold your Coumadin until your followup appointment. Dr Holland will instruct you on when to restart.

## 2019-12-10 NOTE — PRE-ANESTHESIA EVALUATION ADULT - NS MD HP INPLANTS MED DEV
Pacemaker [employer] Model #[id number] Serial #[id number], Annuloplasty ring(mitral valve)/Pacemaker

## 2019-12-10 NOTE — ASU DISCHARGE PLAN (ADULT/PEDIATRIC) - CARE PROVIDER_API CALL
Cody Holland)  Urology  [address], [place] [address]  Phone: [phone]  Fax: [phone]  Follow Up Time:

## 2019-12-10 NOTE — PRE-ANESTHESIA EVALUATION ADULT - NSRADCARDRESULTSFT_GEN_ALL_CORE
Implants/Medical Devices	Pacemaker; Pacemaker [employer] Medical Model #[id number] Serial #[id number], Annuloplasty ring(mitral valve)  Interrogation Date (if available)	23-Jul-2019 Implants/Medical Devices	Pacemaker; Pacemaker [employer] Medical Model #[id number] Serial #[id number], Annuloplasty ring(mitral valve)  Interrogation Date (if available)	23-Jul-2019: Not pacemaker dependent. Battery life 7 years and 6 months. Mode DDD

## 2019-12-10 NOTE — BRIEF OPERATIVE NOTE - NSICDXBRIEFPROCEDURE_GEN_ALL_CORE_FT
PROCEDURES:  Cystoscopy with transurethral resection of bladder tumor (TURBT), with instillation of mitomycin C if indicated 10-Dec-2019 15:18:00 no Guille Neff

## 2019-12-12 NOTE — LETTER BODY
[Dear  ___] : Dear  [unfilled], [Thank you very much for allowing me to participate in the care of this patient. If you have any questions, please do not hesitate to contact me] : Thank you very much for allowing me to participate in the care of this patient. If you have any questions, please do not hesitate to contact me. [Attached please find my note.] : Attached please find my note. [FreeTextEntry1] : ACP\par [address] \[place], [place], [address]  \par [phone] \par

## 2019-12-12 NOTE — HISTORY OF PRESENT ILLNESS
[FreeTextEntry1] : He is a 89-year-old who is seen today in follow up. He underwent bladder tumor resection 2 days ago and he is here today to remove the catheter. Urine is clear. 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 Circumcision was performed in 2018. Usually, nocturia is 3-4 times. CT scan in September 2019 showed bilateral renal cysts with left-sided cyst which had calcification but smaller than before. Creatinine was 1.04. He is on Flomax. Residual urine before was 170 cc.

## 2019-12-12 NOTE — PHYSICAL EXAM
[General Appearance - Well Nourished] : well nourished [General Appearance - Well Developed] : well developed [Normal Appearance] : normal appearance [Well Groomed] : well groomed [Abdomen Soft] : soft [General Appearance - In No Acute Distress] : no acute distress [Costovertebral Angle Tenderness] : no ~M costovertebral angle tenderness [Abdomen Tenderness] : non-tender [Penis Abnormality] : normal circumcised penis [Urinary Bladder Findings] : the bladder was normal on palpation [Urethral Meatus] : meatus normal [Testes Tenderness] : no tenderness of the testes [Scrotum] : the scrotum was normal [Testes Mass (___cm)] : there were no testicular masses [] : no respiratory distress [FreeTextEntry1] : Correa with yellow urine [Exaggerated Use Of Accessory Muscles For Inspiration] : no accessory muscle use [Respiration, Rhythm And Depth] : normal respiratory rhythm and effort

## 2019-12-12 NOTE — ASSESSMENT
[FreeTextEntry1] : Correa catheter was removed. He should be able to void hopefully well. Pathology report was discussed with the patient and his daughter. Given his age, observation could be considered. Also intravesical therapy and repeat bladder tumor resection was reviewed. For now, they have decided to proceed with intravesical therapy which will be scheduled in about 4 weeks from now. Risks of treatment were discussed. Questions and concerns were answered.\par \par Cody Holland MD, FACS\par [facility]\par  of Urology\par \par [address], [address]\[place] [address]\par \par [address], [address]\[place] [address]\par \par Tel: [phone]\par Fax: [phone]

## 2019-12-30 NOTE — ED PROVIDER NOTE - NS ED ROS FT
ROS:  GENERAL: No fever, no chills  EYES: no change in vision  HEENT: no trouble swallowing, no trouble speaking  CARDIAC: no chest pain  PULMONARY: wheezing, dyspnea, no cough  GI: no abdominal pain, no nausea, no vomiting, no diarrhea, no constipation  : No dysuria, hematuria or frequency  SKIN: no rashes  NEURO: no headache, no weakness  MSK: No joint pain

## 2019-12-30 NOTE — ED ADULT NURSE NOTE - OBJECTIVE STATEMENT
Pt presented to the ED c/o wheezing since last friday but worsening today. Pt states he is SOB and has a nonproductive cough. Pt denies chest pain, new onset swelling in extremities, fever, chills, or N/V/D. Pt states he needs to change positions when he sleeps to help breathe better. Upon assessment, wheezing auscultated, no swelling in extremities noted, no use of accessory muscles noted. Duoneb started to help with wheezing as per MD order. Daughter at bedside. Pt states he has a pacemaker, stent, and h/o cardiac bypass surgery. Pt presented to the ED c/o wheezing since last friday but worsening today. Pt states he is SOB and has a nonproductive cough. Pt denies chest pain, new onset swelling in extremities, fever, chills, or N/V/D. Pt states he needs to change positions when he sleeps to help breathe better. Pt states he uses "2 or 3 pillows to sleep." Upon assessment, wheezing auscultated, no swelling in extremities noted, no use of accessory muscles noted. Duoneb started to help with wheezing as per MD order. Daughter at bedside. Pt states he has a pacemaker, stent, and h/o cardiac bypass surgery. Pt presented to the ED c/o wheezing since last friday but worsening today. Pt states he is SOB and has a nonproductive cough. Pt denies chest pain, new onset swelling in extremities, fever, chills, N/V/D, sore throat, or new onset in difficulty swallowing. Pt states he uses "2 or 3 pillows to help breathe better when I sleep." Pt states he becomes dyspneic when walking. Upon assessment, wheezing auscultated, no swelling in extremities noted, no use of accessory muscles noted. Duoneb started to help with wheezing as per MD order. Daughter at bedside. Pt states he has a pacemaker, stent, and h/o cardiac bypass surgery.

## 2019-12-30 NOTE — ED PROVIDER NOTE - NSFOLLOWUPINSTRUCTIONS_ED_ALL_ED_FT
- take albuterol and prednisone as prescribed You were seen in the ER for wheezing.  You were given albuterol/ipratropium and prednisone.  Take prednisone 10mg every day for next 4 days.  Take albuterol every 4 hours as needed for wheezing.  Return to ER for life threatening emergencies.  Follow up with your doctor.

## 2019-12-30 NOTE — ED PROVIDER NOTE - CLINICAL SUMMARY MEDICAL DECISION MAKING FREE TEXT BOX
89yo male p/w 4 days expiratory wheezing and mild dyspnea likely viral bronchitis. May be asthma but would be abnormal with no h/o asthma. foreign body less likely. Duonebs, CXR, basic labs and reassess. Will walk to test for desaturation after treatment. 89yo male p/w 4 days expiratory wheezing and mild dyspnea likely viral bronchitis. May be asthma but would be abnormal with no h/o asthma. foreign body less likely. Duonebs, CXR for r/o PNA given, basic labs and reassess. Will walk to test for desaturation after treatment. Likely dc home.

## 2019-12-30 NOTE — ED ADULT NURSE REASSESSMENT NOTE - NS ED NURSE REASSESS COMMENT FT1
Pt completed a walking test to assess oxygenation level. Lowest 92% on pulse ox. MD aware and comfortable with this level to D/C pt home.

## 2019-12-30 NOTE — ED PROVIDER NOTE - PHYSICAL EXAMINATION
Physical Exam:  Gen: NAD, AOx3, non-toxic appearing, able to ambulate without assistance  Head: NCAT  HEENT: normal oropharynx, no tonsilar erythema or exudates, no foreign body, EOMI, PEERLA, normal conjunctiva, tongue midline, oral mucosa moist  Lung: diffuse, expiratory wheeze, no respiratory distress, speaking in full sentences  CV: RRR, no murmurs, rubs or gallops, distal pulses 2+ b/l  Abd: soft, NT, ND  MSK: no visible deformities, ROM normal in UE/LE, no back TTP  Neuro: No focal sensory or motor deficits  Skin: Warm, well perfused, no rash, no leg swelling  Psych: normal affect, calm

## 2019-12-30 NOTE — ED PROVIDER NOTE - PATIENT PORTAL LINK FT
You can access the FollowMyHealth Patient Portal offered by [facility] by registering at the following website: [email or web address]. By joining [employer]’s FollowMyHealth portal, you will also be able to view your health information using other applications (apps) compatible with our system.

## 2019-12-30 NOTE — ED PROVIDER NOTE - PROGRESS NOTE DETAILS
aRfia PGY1: Patient wheezing significantly improved, feels better. Albuterol inhaler and prednisone sent to pharmacy. Awaiting lab work. Pt feels better after albuterol. When ambulating Pt sats 92-94%. Will give another neb, stable for discharge. Pt persistently tachycardic to 120s after nebs despite fluids tylenol for over 3 hours. EKG showing similar morphology as one from previous. Will admit for bronchitis/wheezing, tachycardia. Admitted to Dr. Locke for persistent tachycardia.

## 2019-12-30 NOTE — ED PROVIDER NOTE - OBJECTIVE STATEMENT
89yo male A fib on coumadin w/ pacemaker, BPH, CAD, GERD, HLD, HTN, MV repair presenting w/ urinary retention, recent transurethral bladder tumor resection p/w 4 days of wheezing. Intermittent dyspnea but no decrease in exercise tolerance. Patient and daughter feel has worsened. Denies fever, sore throat, cough, foreign body, change in voice, recent choking, prior episodes of wheezing, h/o asthma or COPD. Lives with daughter.

## 2019-12-30 NOTE — ED PROVIDER NOTE - ATTENDING CONTRIBUTION TO CARE
Patient presenting complaining of 4 days of wheezing and dyspnea.  Began spontaneously.  Denying associated cough, fevers or sick contacts.  No prior history of pulmonary disease.  No associated chest pains or peripheral edema.  No recent travel.    A 14 point review of systems is negative except as in HPI or otherwise documented.    Exam:  General: Patient well appearing, hypoxic to 92% on room air otherwise vital signs within normal limits  HEENT: airway patent with moist mucous membranes  Cardiac: irregularly irregular S1/S2 with strong peripheral pulses  Respiratory: diffuse mild expiratory wheezing without respiratory distress  GI: abdomen soft, non tender, non distended  Neuro: no gross neurologic deficits  Skin: warm, well perfused  Psych: normal mood and affect    Patient presenting with four days of wheezing without history of pulmonary disease, non toxic appearing clinically - suspect possible infectious bronchitis.  Will treat with albuterol/prednisone, obtain labs, flu swab, CXR to r/o pneumonia and re-evaluate for hypoxia after treatment.

## 2019-12-30 NOTE — ED ADULT NURSE REASSESSMENT NOTE - NS ED NURSE REASSESS COMMENT FT1
Pt states he is able to breathe better after duoneb treatment. Slight wheezing still heard on auscultation. MD aware. Will continue to monitor.

## 2019-12-30 NOTE — ED PROVIDER NOTE - PSH
Artificial pacemaker  [employer] (formerly [employer] Medical Model #[id number] Serial #[id number]  Breast cyst  bilateral around 50 years ago  H/O circumcision  2018  S/P CABG (coronary artery bypass graft)  x2 vessels 2013  S/P heart valve repair  mitral valve repair with annuloplasty ring 2013  S/P small bowel resection  due to perforated intestine  during colonoscopy around 2000  Stented coronary artery  1995

## 2019-12-30 NOTE — ED ADULT NURSE REASSESSMENT NOTE - NS ED NURSE REASSESS COMMENT FT1
Pt placed on cardiac monitor. Pt tachy with PACs. MD aware. Awaiting updated plan of care. Will continue to monitor.

## 2019-12-30 NOTE — ED ADULT NURSE NOTE - PSH
Artificial pacemaker  [employer] Medical Model #[id number] Serial #[id number]  Breast cyst  bilateral around 50 years ago  H/O circumcision  2018  S/P CABG (coronary artery bypass graft)  x2 vessels 2013  S/P heart valve repair  mitral valve repair with annuloplasty ring 2013  S/P small bowel resection  due to perforated intestine  during colonoscopy around 2000  Stented coronary artery  1995

## 2019-12-30 NOTE — ED ADULT NURSE REASSESSMENT NOTE - NS ED NURSE REASSESS COMMENT FT1
Pt is tachy post duoneb. MD aware and as per MD, pt currently staying in ED to monitor heart rate. Pt on continuous pulse ox. Pt given NS to help bring heart rate down.

## 2019-12-31 NOTE — H&P ADULT - PROBLEM SELECTOR PLAN 1
suspect viral URI , CXR w/o infiltrate, no ischemic changes on ekg , symptoms improved with nebulizers, no evidence of pulmonary edema , low suspicion for PE as patient on warfarin , however was recently held  for surgical procedure, if symptoms do not improve can consider working up as an alternative diagnosis   - Levalbuterol q6hr   - monitor on telemetry   - f/u Troponin

## 2019-12-31 NOTE — H&P ADULT - PROBLEM SELECTOR PLAN 2
reactive airway in setting of URI , no prior history of asthma , RVP neg for flu   - f/u full RVP   - Benzonatate PRN   - if symptoms not resolving can obtain CT chest

## 2019-12-31 NOTE — H&P ADULT - NSICDXPASTSURGICALHX_GEN_ALL_CORE_FT
PAST SURGICAL HISTORY:  Artificial pacemaker [employer] Model #[id number] Serial #[id number]    Breast cyst bilateral around 50 years ago    H/O circumcision 2018    S/P CABG (coronary artery bypass graft) x2 vessels 2013    S/P heart valve repair mitral valve repair with annuloplasty ring 2013    S/P small bowel resection due to perforated intestine  during colonoscopy around 2000    Stented coronary artery 1995

## 2019-12-31 NOTE — H&P ADULT - HISTORY OF PRESENT ILLNESS
Patient is a 89 year old male with a past medical history significant for Afib on coumadin s/p pacemaker, BPH, CAD, GERD, HLD, HTN, MV repair urinary retention s/p recent transurethral bladder tumor resection , presents for wheezing and shortness of breath over the past four days. Patient is a 89 year old male with a past medical history significant for Afib on coumadin s/p pacemaker, BPH, CAD, GERD, HLD, HTN, MV repair urinary retention s/p recent transurethral bladder tumor resection , presents for wheezing and shortness of breath over the past four days. Patient reports mild nasal congestion and runny nose. He reports increased dyspnea on exertion and decreased exercise tolerance associated with wheezing over the past few days, and on day of admission , he reports symptoms even at rest. He has no fever or chills , no chest pain. He reports intermittent palpitations . No nausea or vomiting. He has longstanding lower extremity edema, not worse than usual.  He has no calf tenderness. He reports no recent travel no sick contacts. He recently underwent bladder tumor resection as an ambulatory procedure.

## 2019-12-31 NOTE — PROGRESS NOTE ADULT - SUBJECTIVE AND OBJECTIVE BOX
89 M w/pmh Afib on coumadin s/p pacemaker, BPH, CAD, GERD, HLD, HTN, MV repair urinary retention s/p recent transurethral bladder tumor resection , p/w SOB and  wheezing  x 4 days. PT with viral URI noted to have tachypnea on exam, with diffuse b/l wheezing, with mild bibasilar crackles currently saturating well on RA, keep Stat >92%, consider starting steroid taper and CT chest if symptoms do not improve c/w Levalbuterol q4hr.Pt with mod-sever MR, d/w Dr. Rangel Pt started on lasix 20 mg IV BID for a short course, c/w BB digoxin.

## 2019-12-31 NOTE — H&P ADULT - ASSESSMENT
89 M w/pmh Afib on coumadin s/p pacemaker, BPH, CAD, GERD, HLD, HTN, MV repair urinary retention s/p recent transurethral bladder tumor resection , p/w SOB and  wheezing  x 4 days .

## 2019-12-31 NOTE — H&P ADULT - NSHPSOCIALHISTORY_GEN_ALL_CORE
Social History:    Marital Status:  (   )    (   ) Single    (   )    ( x )   Occupation: retired   Lives with: (  ) alone  ( x ) children   (  ) spouse   (  ) parents  (  ) other    Substance Use (street drugs): (x  ) never used  (  ) other:  Tobacco Usage:  ( x  ) never smoked   (   ) former smoker   (   ) current smoker  (     ) pack year  (        ) last cigarette date  Alcohol Usage:  occasional etoh use

## 2019-12-31 NOTE — CONSULT NOTE ADULT - ASSESSMENT
Mr. Johnson is a 89 year old man with a history of CAD s/p PCI to the mid LAD with a BMS in 1994, double vessel CABG and mitral valve repair February 8th, 2013, paroxysmal atrial fibrillation/flutter with TBS s/p [employer] dual chamber PPM, on Coumadin for stroke risk reduction, hypertension and hyperlipidemia, mild LV dysfunction.  His most recent echo in our office demonstrated moderate to severe MR and moderate to severe TR with moderate pulmonary hypertension as of 2/2019. His EF was around 50%.    - he is demonstrating wheezing and shortness of breath, and there seems to be a viral component to this  - he has mild overload on exam, with small shirley effusions on cxr. He does have severe mr/tr and elevated pulmonary pressures. Would increase his lasix to 20 iv bid in the short term. Add on a bnp for am.  - af rates are borderline, in the setting of his dyspnea. continue with metoprolol and digoxin. continue coumadin for goal inr 2-3. He has been on and off a/c and his inr is subtherapeutic, so the possibility of vte does exist.  - bp is at goal. continue with losartan  - no sign of acute ischemia. his hs troponins are negative.   - oxygen supplementation as necessary  - watch creatinine and electrolytes. Keep K>4, Mg>2  - will follow with you.

## 2019-12-31 NOTE — H&P ADULT - NSHPLABSRESULTS_GEN_ALL_CORE
Labs personally reviewed:                          11.2   7.75  )-----------( 131      ( 30 Dec 2019 18:57 )             36.1     12-30    140  |  101  |  18  ----------------------------<  127<H>  4.2   |  24  |  0.92    Ca    9.8      30 Dec 2019 18:57            PT/INR - ( 30 Dec 2019 18:57 )   PT: 19.9 sec;   INR: 1.70 ratio         PTT - ( 30 Dec 2019 18:57 )  PTT:37.4 sec    CAPILLARY BLOOD GLUCOSE          Imaging:  CXR personally reviewed: small bilateral right greater than left pleural effusions. no focal consolidation    EKG personally reviewed: Labs personally reviewed:                          11.2   7.75  )-----------( 131      ( 30 Dec 2019 18:57 )             36.1     12-30    140  |  101  |  18  ----------------------------<  127<H>  4.2   |  24  |  0.92    Ca    9.8      30 Dec 2019 18:57            PT/INR - ( 30 Dec 2019 18:57 )   PT: 19.9 sec;   INR: 1.70 ratio         PTT - ( 30 Dec 2019 18:57 )  PTT:37.4 sec    CAPILLARY BLOOD GLUCOSE          Imaging:  CXR personally reviewed: small bilateral right greater than left pleural effusions. no focal consolidation    EKG personally reviewed: Afib w/ RVR at 111bpm

## 2019-12-31 NOTE — H&P ADULT - NSHPPHYSICALEXAM_GEN_ALL_CORE
Vital Signs Last 24 Hrs  T(C): 37.1 (31 Dec 2019 00:00), Max: 37.1 (31 Dec 2019 00:00)  T(F): 98.7 (31 Dec 2019 00:00), Max: 98.7 (31 Dec 2019 00:00)  HR: 117 (31 Dec 2019 00:00) (79 - 125)  BP: 145/81 (31 Dec 2019 00:00) (120/75 - 145/81)  BP(mean): --  RR: 16 (31 Dec 2019 00:00) (16 - 22)  SpO2: 95% (31 Dec 2019 00:00) (92% - 96%) Vital Signs Last 24 Hrs  T(C): 37.1 (31 Dec 2019 00:00), Max: 37.1 (31 Dec 2019 00:00)  T(F): 98.7 (31 Dec 2019 00:00), Max: 98.7 (31 Dec 2019 00:00)  HR: 117 (31 Dec 2019 00:00) (79 - 125)  BP: 145/81 (31 Dec 2019 00:00) (120/75 - 145/81)  BP(mean): --  RR: 16 (31 Dec 2019 00:00) (16 - 22)  SpO2: 95% (31 Dec 2019 00:00) (92% - 96%)  GENERAL:  moderate respiratory  distress, intermittent dry cough , well-developed  HEAD:  Atraumatic, Normocephalic  ENT: EOMI, PERRLA, conjunctiva and sclera clear,  moist mucosa no pharyngeal erythema or exudates , dried mucous in nares  NECK: supple , no JVD   CHEST/LUNG: Clear to auscultation bilaterally; + mild expiratory  wheeze b/l , decreased air movement , equal breath sounds bilaterally   BACK: No spinal tenderness,  No CVA tenderness   HEART: Regular rate and rhythm; + systolic  murmur at apex , no rubs, or gallops  ABDOMEN: Soft, Nontender, Nondistended; Bowel sounds present  EXTREMITIES:  No clubbing, cyanosis, +1 pitting  edema b/l , no calf tenderness , no palpable cords   MSK: No joint swelling or effusions, ROM intact   PSYCH: Normal behavior/affect  NEUROLOGY: AAOx3, non-focal, cranial nerves intact, gait intact   SKIN: Normal color, No rashes or lesions

## 2019-12-31 NOTE — H&P ADULT - PROBLEM SELECTOR PLAN 3
likely exacerbated with bronchodilators , will change to Xopenex  and resume home beta blocker   - coumadin  ( home dose 4-5mg ) - daily dosed per INR   - Metoprolol   - Digoxin

## 2019-12-31 NOTE — CONSULT NOTE ADULT - SUBJECTIVE AND OBJECTIVE BOX
[facility] - Preet Glover, Tere, Andreea, Anh, Juan Carlos Garza  Office Number: [phone]    Initial Consult Note    CHIEF COMPLAINT: Patient is a 89y old  Male who presents with a chief complaint of Wheezing and SOB x 4 days (31 Dec 2019 00:54)      HPI:  Patient is a 89 year old male with a past medical history significant for Afib on coumadin s/p pacemaker, BPH, CAD, GERD, HLD, HTN, MV repair urinary retention s/p recent transurethral bladder tumor resection , presents for wheezing and shortness of breath over the past four days. Patient reports mild nasal congestion and runny nose. He reports increased dyspnea on exertion and decreased exercise tolerance associated with wheezing over the past few days, and on day of admission , he reports symptoms even at rest. He has no fever or chills , no chest pain. He reports intermittent palpitations . No nausea or vomiting. He has longstanding lower extremity edema, not worse than usual.  He has no calf tenderness. He reports no recent travel no sick contacts. He recently underwent bladder tumor resection as an ambulatory procedure. (31 Dec 2019 00:54)    He was feeling well until about Friday. At this time, he has noted babb and wheezing.  He is a 89 year old man with a history of CAD s/p PCI to the mid LAD with a BMS in 1994, double vessel CABG and mitral valve repair February 8th, 2013, paroxysmal atrial fibrillation/flutter with TBS s/p [employer] dual chamber PPM, on Coumadin for stroke risk reduction, hypertension and hyperlipidemia, mild LV dysfunction. He was last seen in the office in 10/2019.    He has moderate to severe MR and moderate to severe TR with moderate pulmonary hypertension as of 2/2019. He has mild LV dysfunction with an EF around 50 %. He has been off of aspirin as he has stable CAD and is on AC with Coumadin.  He has been relatively stable on lasix 40 mg po bid.      PAST MEDICAL & SURGICAL HISTORY:  Phimosis  Seizure: age 9, had 1 seizure, s/p fall  GERD (gastroesophageal reflux disease)  Valvular heart disease: S/p mitral valve repair  CAD (coronary artery disease): BMS x 1  in the LAD 1995; S/P CABG x2V 2013  BPH (benign prostatic hyperplasia)  HLD (hyperlipidemia)  HTN (hypertension)  Atrial fibrillation  Cataract  Glaucoma  H/O circumcision: 2018  Artificial pacemaker: [employer] Model #UH8330 Serial #[id number]  S/P heart valve repair: mitral valve repair with annuloplasty ring 2013  S/P CABG (coronary artery bypass graft): x2 vessels 2013  S/P small bowel resection: due to perforated intestine  during colonoscopy around 2000  Breast cyst: bilateral around 50 years ago  Stented coronary artery: 1995      SOCIAL HISTORY:  No tobacco, ethanol, or drug abuse.    FAMILY HISTORY:  Family history of ischemic heart disease      MEDICATIONS  (STANDING):  ALBUTerol    0.083% 2.5 milliGRAM(s) Nebulizer every 4 hours  ALBUTerol    0.083%. 2.5 milliGRAM(s) Nebulizer once  atorvastatin 10 milliGRAM(s) Oral at bedtime  digoxin     Tablet 0.125 milliGRAM(s) Oral every other day  furosemide    Tablet 40 milliGRAM(s) Oral daily  losartan 100 milliGRAM(s) Oral daily  metoprolol tartrate 50 milliGRAM(s) Oral two times a day  tamsulosin 0.8 milliGRAM(s) Oral at bedtime    MEDICATIONS  (PRN):  acetaminophen   Tablet .. 650 milliGRAM(s) Oral every 4 hours PRN Mild Pain (1 - 3)  benzonatate 100 milliGRAM(s) Oral three times a day PRN Cough  levalbuterol Inhalation 0.63 milliGRAM(s) Inhalation every 6 hours PRN Wheeze/SOB      Allergies    No Known Allergies    Intolerances        REVIEW OF SYSTEMS:    CONSTITUTIONAL: No weakness, fevers or chills  EYES/ENT: No visual changes;  No vertigo or throat pain   NECK: No pain or stiffness  RESPIRATORY: No cough, wheezing, hemoptysis; + shortness of breath  CARDIOVASCULAR: No chest pain or palpitations  GASTROINTESTINAL: No abdominal pain. No nausea, vomiting, or hematemesis; No diarrhea or constipation. No melena or hematochezia.  GENITOURINARY: No dysuria, frequency or hematuria  NEUROLOGICAL: No numbness or weakness  SKIN: No itching or rash  All other review of systems is negative unless indicated above    VITAL SIGNS:   Vital Signs Last 24 Hrs  T(C): 36.4 (31 Dec 2019 08:37), Max: 37.1 (31 Dec 2019 00:00)  T(F): 97.6 (31 Dec 2019 08:37), Max: 98.7 (31 Dec 2019 00:00)  HR: 79 (31 Dec 2019 08:37) (79 - 125)  BP: 126/74 (31 Dec 2019 08:37) (120/75 - 145/81)  BP(mean): --  RR: 18 (31 Dec 2019 08:37) (16 - 22)  SpO2: 94% (31 Dec 2019 08:37) (92% - 96%)    I&O's Summary    31 Dec 2019 07:01  -  31 Dec 2019 11:19  --------------------------------------------------------  IN: 0 mL / OUT: 50 mL / NET: -50 mL        On Exam:    Constitutional: NAD, alert and oriented x 3  Lungs:  Non-labored, + exp wheeze and cough on breaths, No rales, though faint rhonchi.  Cardiovascular: RRR.  S1 and S2 positive.  No murmurs, rubs, gallops or clicks  Gastrointestinal: Bowel Sounds present, soft, nontender.   Lymph: No peripheral edema. No cervical lymphadenopathy.  Neurological: Alert, no focal deficits  Skin: No rashes or ulcers   Psych:  Mood & affect appropriate.    LABS: All Labs Reviewed:                        9.9    6.15  )-----------( 140      ( 31 Dec 2019 08:44 )             31.3                         11.2   7.75  )-----------( 131      ( 30 Dec 2019 18:57 )             36.1     31 Dec 2019 05:54    140    |  102    |  20     ----------------------------<  170    3.8     |  24     |  0.93   30 Dec 2019 18:57    140    |  101    |  18     ----------------------------<  127    4.2     |  24     |  0.92     Ca    9.4        31 Dec 2019 05:54  Ca    9.8        30 Dec 2019 18:57    TPro  6.2    /  Alb  3.8    /  TBili  0.5    /  DBili  x      /  AST  20     /  ALT  14     /  AlkPhos  170    31 Dec 2019 05:54    PT/INR - ( 31 Dec 2019 09:17 )   PT: 19.4 sec;   INR: 1.66 ratio         PTT - ( 31 Dec 2019 09:17 )  PTT:36.6 sec      Blood Culture:         RADIOLOGY:    EKG: af, rad, nsst abn

## 2020-01-01 ENCOUNTER — APPOINTMENT (OUTPATIENT)
Dept: UROLOGY | Facility: CLINIC | Age: 85
End: 2020-01-01
Payer: MEDICARE

## 2020-01-01 ENCOUNTER — APPOINTMENT (OUTPATIENT)
Dept: CARDIOLOGY | Facility: CLINIC | Age: 85
End: 2020-01-01
Payer: MEDICARE

## 2020-01-01 ENCOUNTER — TRANSCRIPTION ENCOUNTER (OUTPATIENT)
Age: 85
End: 2020-01-01

## 2020-01-01 ENCOUNTER — APPOINTMENT (OUTPATIENT)
Dept: ULTRASOUND IMAGING | Facility: HOSPITAL | Age: 85
End: 2020-01-01

## 2020-01-01 ENCOUNTER — APPOINTMENT (OUTPATIENT)
Dept: CT IMAGING | Facility: IMAGING CENTER | Age: 85
End: 2020-01-01
Payer: MEDICARE

## 2020-01-01 ENCOUNTER — NON-APPOINTMENT (OUTPATIENT)
Age: 85
End: 2020-01-01

## 2020-01-01 ENCOUNTER — OUTPATIENT (OUTPATIENT)
Dept: OUTPATIENT SERVICES | Facility: HOSPITAL | Age: 85
LOS: 1 days | End: 2020-01-01
Payer: COMMERCIAL

## 2020-01-01 ENCOUNTER — APPOINTMENT (OUTPATIENT)
Dept: PULMONOLOGY | Facility: CLINIC | Age: 85
End: 2020-01-01
Payer: MEDICARE

## 2020-01-01 ENCOUNTER — FORM ENCOUNTER (OUTPATIENT)
Age: 85
End: 2020-01-01

## 2020-01-01 ENCOUNTER — APPOINTMENT (OUTPATIENT)
Dept: ELECTROPHYSIOLOGY | Facility: CLINIC | Age: 85
End: 2020-01-01
Payer: MEDICARE

## 2020-01-01 ENCOUNTER — APPOINTMENT (OUTPATIENT)
Dept: PULMONOLOGY | Facility: CLINIC | Age: 85
End: 2020-01-01

## 2020-01-01 ENCOUNTER — APPOINTMENT (OUTPATIENT)
Dept: CT IMAGING | Facility: IMAGING CENTER | Age: 85
End: 2020-01-01

## 2020-01-01 ENCOUNTER — OUTPATIENT (OUTPATIENT)
Dept: OUTPATIENT SERVICES | Facility: HOSPITAL | Age: 85
LOS: 1 days | Discharge: HOME | End: 2020-01-01

## 2020-01-01 VITALS
OXYGEN SATURATION: 95 % | SYSTOLIC BLOOD PRESSURE: 158 MMHG | HEART RATE: 68 BPM | DIASTOLIC BLOOD PRESSURE: 81 MMHG | HEIGHT: 65 IN | BODY MASS INDEX: 25.33 KG/M2 | WEIGHT: 152 LBS

## 2020-01-01 VITALS
BODY MASS INDEX: 24.32 KG/M2 | SYSTOLIC BLOOD PRESSURE: 117 MMHG | OXYGEN SATURATION: 98 % | RESPIRATION RATE: 14 BRPM | HEIGHT: 65 IN | DIASTOLIC BLOOD PRESSURE: 69 MMHG | WEIGHT: 146 LBS | HEART RATE: 68 BPM | TEMPERATURE: 97.6 F

## 2020-01-01 VITALS
HEART RATE: 86 BPM | SYSTOLIC BLOOD PRESSURE: 95 MMHG | RESPIRATION RATE: 18 BRPM | DIASTOLIC BLOOD PRESSURE: 56 MMHG | OXYGEN SATURATION: 95 % | TEMPERATURE: 97 F

## 2020-01-01 VITALS
RESPIRATION RATE: 14 BRPM | OXYGEN SATURATION: 97 % | BODY MASS INDEX: 24.32 KG/M2 | HEIGHT: 65 IN | TEMPERATURE: 97.6 F | WEIGHT: 146 LBS | HEART RATE: 66 BPM | SYSTOLIC BLOOD PRESSURE: 126 MMHG | DIASTOLIC BLOOD PRESSURE: 71 MMHG

## 2020-01-01 VITALS
WEIGHT: 146 LBS | SYSTOLIC BLOOD PRESSURE: 136 MMHG | DIASTOLIC BLOOD PRESSURE: 80 MMHG | OXYGEN SATURATION: 93 % | BODY MASS INDEX: 24.32 KG/M2 | HEIGHT: 65 IN | HEART RATE: 74 BPM | RESPIRATION RATE: 13 BRPM

## 2020-01-01 VITALS
BODY MASS INDEX: 24.32 KG/M2 | OXYGEN SATURATION: 93 % | HEIGHT: 65 IN | DIASTOLIC BLOOD PRESSURE: 64 MMHG | WEIGHT: 146 LBS | HEART RATE: 97 BPM | SYSTOLIC BLOOD PRESSURE: 99 MMHG

## 2020-01-01 VITALS
HEART RATE: 79 BPM | DIASTOLIC BLOOD PRESSURE: 85 MMHG | OXYGEN SATURATION: 97 % | BODY MASS INDEX: 23.49 KG/M2 | WEIGHT: 141 LBS | RESPIRATION RATE: 16 BRPM | HEIGHT: 65 IN | TEMPERATURE: 97.5 F | SYSTOLIC BLOOD PRESSURE: 134 MMHG

## 2020-01-01 VITALS
RESPIRATION RATE: 14 BRPM | SYSTOLIC BLOOD PRESSURE: 104 MMHG | OXYGEN SATURATION: 94 % | WEIGHT: 146 LBS | HEART RATE: 92 BPM | HEIGHT: 65 IN | BODY MASS INDEX: 24.32 KG/M2 | DIASTOLIC BLOOD PRESSURE: 72 MMHG

## 2020-01-01 VITALS
SYSTOLIC BLOOD PRESSURE: 136 MMHG | OXYGEN SATURATION: 96 % | BODY MASS INDEX: 24.32 KG/M2 | WEIGHT: 146 LBS | DIASTOLIC BLOOD PRESSURE: 86 MMHG | HEIGHT: 65 IN | HEART RATE: 70 BPM | RESPIRATION RATE: 13 BRPM

## 2020-01-01 VITALS — SYSTOLIC BLOOD PRESSURE: 130 MMHG | DIASTOLIC BLOOD PRESSURE: 70 MMHG

## 2020-01-01 DIAGNOSIS — R60.9 EDEMA, UNSPECIFIED: ICD-10-CM

## 2020-01-01 DIAGNOSIS — C67.2 MALIGNANT NEOPLASM OF LATERAL WALL OF BLADDER: ICD-10-CM

## 2020-01-01 DIAGNOSIS — Z78.9 OTHER SPECIFIED HEALTH STATUS: ICD-10-CM

## 2020-01-01 DIAGNOSIS — I50.9 HEART FAILURE, UNSPECIFIED: ICD-10-CM

## 2020-01-01 DIAGNOSIS — J12.3 HUMAN METAPNEUMOVIRUS PNEUMONIA: ICD-10-CM

## 2020-01-01 DIAGNOSIS — Z98.890 OTHER SPECIFIED POSTPROCEDURAL STATES: Chronic | ICD-10-CM

## 2020-01-01 DIAGNOSIS — L97.811 NON-PRESSURE CHRONIC ULCER OF OTHER PART OF RIGHT LOWER LEG LIMITED TO BREAKDOWN OF SKIN: ICD-10-CM

## 2020-01-01 DIAGNOSIS — I87.311 CHRONIC VENOUS HYPERTENSION (IDIOPATHIC) WITH ULCER OF RIGHT LOWER EXTREMITY: ICD-10-CM

## 2020-01-01 DIAGNOSIS — I25.10 ATHEROSCLEROTIC HEART DISEASE OF NATIVE CORONARY ARTERY WITHOUT ANGINA PECTORIS: ICD-10-CM

## 2020-01-01 DIAGNOSIS — M79.604 PAIN IN RIGHT LEG: ICD-10-CM

## 2020-01-01 DIAGNOSIS — I11.0 HYPERTENSIVE HEART DISEASE WITH HEART FAILURE: ICD-10-CM

## 2020-01-01 DIAGNOSIS — H26.9 UNSPECIFIED CATARACT: ICD-10-CM

## 2020-01-01 DIAGNOSIS — Z95.5 PRESENCE OF CORONARY ANGIOPLASTY IMPLANT AND GRAFT: ICD-10-CM

## 2020-01-01 DIAGNOSIS — Z79.01 LONG TERM (CURRENT) USE OF ANTICOAGULANTS: ICD-10-CM

## 2020-01-01 DIAGNOSIS — I87.302 CHRONIC VENOUS HYPERTENSION (IDIOPATHIC) WITHOUT COMPLICATIONS OF LEFT LOWER EXTREMITY: ICD-10-CM

## 2020-01-01 DIAGNOSIS — M88.9 OSTEITIS DEFORMANS OF UNSPECIFIED BONE: ICD-10-CM

## 2020-01-01 DIAGNOSIS — I50.33 ACUTE ON CHRONIC DIASTOLIC (CONGESTIVE) HEART FAILURE: ICD-10-CM

## 2020-01-01 DIAGNOSIS — Z95.0 PRESENCE OF CARDIAC PACEMAKER: ICD-10-CM

## 2020-01-01 DIAGNOSIS — I48.91 UNSPECIFIED ATRIAL FIBRILLATION: ICD-10-CM

## 2020-01-01 DIAGNOSIS — R93.89 ABNORMAL FINDINGS ON DIAGNOSTIC IMAGING OF OTHER SPECIFIED BODY STRUCTURES: ICD-10-CM

## 2020-01-01 DIAGNOSIS — C67.9 MALIGNANT NEOPLASM OF BLADDER, UNSPECIFIED: ICD-10-CM

## 2020-01-01 DIAGNOSIS — K59.00 CONSTIPATION, UNSPECIFIED: ICD-10-CM

## 2020-01-01 DIAGNOSIS — Z98.89 OTHER SPECIFIED POSTPROCEDURAL STATES: Chronic | ICD-10-CM

## 2020-01-01 DIAGNOSIS — I49.9 CARDIAC ARRHYTHMIA, UNSPECIFIED: ICD-10-CM

## 2020-01-01 DIAGNOSIS — Z95.1 PRESENCE OF AORTOCORONARY BYPASS GRAFT: Chronic | ICD-10-CM

## 2020-01-01 DIAGNOSIS — R73.03 PREDIABETES: ICD-10-CM

## 2020-01-01 DIAGNOSIS — Z79.899 OTHER LONG TERM (CURRENT) DRUG THERAPY: ICD-10-CM

## 2020-01-01 DIAGNOSIS — Z95.0 PRESENCE OF CARDIAC PACEMAKER: Chronic | ICD-10-CM

## 2020-01-01 DIAGNOSIS — I25.10 ATHEROSCLEROTIC HEART DISEASE OF NATIVE CORONARY ARTERY W/OUT ANGINA PECTORIS: ICD-10-CM

## 2020-01-01 DIAGNOSIS — L89.90 PRESSURE ULCER OF UNSPECIFIED SITE, UNSPECIFIED STAGE: ICD-10-CM

## 2020-01-01 DIAGNOSIS — Z29.9 ENCOUNTER FOR PROPHYLACTIC MEASURES, UNSPECIFIED: ICD-10-CM

## 2020-01-01 DIAGNOSIS — Z02.9 ENCOUNTER FOR ADMINISTRATIVE EXAMINATIONS, UNSPECIFIED: ICD-10-CM

## 2020-01-01 LAB
ALBUMIN SERPL ELPH-MCNC: 3.2 G/DL — LOW (ref 3.3–5)
ALBUMIN SERPL ELPH-MCNC: 3.3 G/DL — SIGNIFICANT CHANGE UP (ref 3.3–5)
ALP SERPL-CCNC: 128 U/L — HIGH (ref 40–120)
ALP SERPL-CCNC: 132 U/L — HIGH (ref 40–120)
ALT FLD-CCNC: 16 U/L — SIGNIFICANT CHANGE UP (ref 10–45)
ALT FLD-CCNC: 28 U/L — SIGNIFICANT CHANGE UP (ref 10–45)
ANION GAP SERPL CALC-SCNC: 10 MMOL/L — SIGNIFICANT CHANGE UP (ref 5–17)
ANION GAP SERPL CALC-SCNC: 11 MMOL/L — SIGNIFICANT CHANGE UP (ref 5–17)
ANION GAP SERPL CALC-SCNC: 12 MMOL/L — SIGNIFICANT CHANGE UP (ref 5–17)
ANION GAP SERPL CALC-SCNC: 12 MMOL/L — SIGNIFICANT CHANGE UP (ref 5–17)
ANION GAP SERPL CALC-SCNC: 13 MMOL/L — SIGNIFICANT CHANGE UP (ref 5–17)
ANION GAP SERPL CALC-SCNC: 14 MMOL/L — SIGNIFICANT CHANGE UP (ref 5–17)
ANION GAP SERPL CALC-SCNC: 15 MMOL/L — SIGNIFICANT CHANGE UP (ref 5–17)
ANION GAP SERPL CALC-SCNC: 16 MMOL/L — SIGNIFICANT CHANGE UP (ref 5–17)
APTT BLD: 33.2 SEC — SIGNIFICANT CHANGE UP (ref 27.5–36.3)
APTT BLD: 34.8 SEC — SIGNIFICANT CHANGE UP (ref 27.5–36.3)
APTT BLD: 36.3 SEC — SIGNIFICANT CHANGE UP (ref 27.5–36.3)
AST SERPL-CCNC: 14 U/L — SIGNIFICANT CHANGE UP (ref 10–40)
AST SERPL-CCNC: 22 U/L — SIGNIFICANT CHANGE UP (ref 10–40)
B PERT DNA SPEC QL NAA+PROBE: SIGNIFICANT CHANGE UP
BASOPHILS # BLD AUTO: 0.01 K/UL — SIGNIFICANT CHANGE UP (ref 0–0.2)
BASOPHILS NFR BLD AUTO: 0.1 % — SIGNIFICANT CHANGE UP (ref 0–2)
BILIRUB SERPL-MCNC: 0.6 MG/DL — SIGNIFICANT CHANGE UP (ref 0.2–1.2)
BILIRUB SERPL-MCNC: 0.7 MG/DL — SIGNIFICANT CHANGE UP (ref 0.2–1.2)
BUN SERPL-MCNC: 19 MG/DL — SIGNIFICANT CHANGE UP (ref 7–23)
BUN SERPL-MCNC: 22 MG/DL — SIGNIFICANT CHANGE UP (ref 7–23)
BUN SERPL-MCNC: 24 MG/DL — HIGH (ref 7–23)
BUN SERPL-MCNC: 25 MG/DL — HIGH (ref 7–23)
BUN SERPL-MCNC: 25 MG/DL — HIGH (ref 7–23)
BUN SERPL-MCNC: 26 MG/DL — HIGH (ref 7–23)
BUN SERPL-MCNC: 30 MG/DL — HIGH (ref 7–23)
BUN SERPL-MCNC: 32 MG/DL — HIGH (ref 7–23)
C PNEUM DNA SPEC QL NAA+PROBE: SIGNIFICANT CHANGE UP
CALCIUM SERPL-MCNC: 9 MG/DL — SIGNIFICANT CHANGE UP (ref 8.4–10.5)
CALCIUM SERPL-MCNC: 9 MG/DL — SIGNIFICANT CHANGE UP (ref 8.4–10.5)
CALCIUM SERPL-MCNC: 9.1 MG/DL — SIGNIFICANT CHANGE UP (ref 8.4–10.5)
CALCIUM SERPL-MCNC: 9.1 MG/DL — SIGNIFICANT CHANGE UP (ref 8.4–10.5)
CALCIUM SERPL-MCNC: 9.3 MG/DL — SIGNIFICANT CHANGE UP (ref 8.4–10.5)
CALCIUM SERPL-MCNC: 9.4 MG/DL — SIGNIFICANT CHANGE UP (ref 8.4–10.5)
CALCIUM SERPL-MCNC: 9.4 MG/DL — SIGNIFICANT CHANGE UP (ref 8.4–10.5)
CALCIUM SERPL-MCNC: 9.6 MG/DL — SIGNIFICANT CHANGE UP (ref 8.4–10.5)
CHLORIDE SERPL-SCNC: 102 MMOL/L — SIGNIFICANT CHANGE UP (ref 96–108)
CHLORIDE SERPL-SCNC: 94 MMOL/L — LOW (ref 96–108)
CHLORIDE SERPL-SCNC: 95 MMOL/L — LOW (ref 96–108)
CHLORIDE SERPL-SCNC: 95 MMOL/L — LOW (ref 96–108)
CHLORIDE SERPL-SCNC: 97 MMOL/L — SIGNIFICANT CHANGE UP (ref 96–108)
CHLORIDE SERPL-SCNC: 98 MMOL/L — SIGNIFICANT CHANGE UP (ref 96–108)
CHLORIDE SERPL-SCNC: 98 MMOL/L — SIGNIFICANT CHANGE UP (ref 96–108)
CHLORIDE SERPL-SCNC: 99 MMOL/L — SIGNIFICANT CHANGE UP (ref 96–108)
CO2 SERPL-SCNC: 23 MMOL/L — SIGNIFICANT CHANGE UP (ref 22–31)
CO2 SERPL-SCNC: 24 MMOL/L — SIGNIFICANT CHANGE UP (ref 22–31)
CO2 SERPL-SCNC: 24 MMOL/L — SIGNIFICANT CHANGE UP (ref 22–31)
CO2 SERPL-SCNC: 26 MMOL/L — SIGNIFICANT CHANGE UP (ref 22–31)
CO2 SERPL-SCNC: 27 MMOL/L — SIGNIFICANT CHANGE UP (ref 22–31)
CO2 SERPL-SCNC: 28 MMOL/L — SIGNIFICANT CHANGE UP (ref 22–31)
CO2 SERPL-SCNC: 28 MMOL/L — SIGNIFICANT CHANGE UP (ref 22–31)
CO2 SERPL-SCNC: 29 MMOL/L — SIGNIFICANT CHANGE UP (ref 22–31)
CREAT SERPL-MCNC: 0.8 MG/DL — SIGNIFICANT CHANGE UP (ref 0.5–1.3)
CREAT SERPL-MCNC: 0.87 MG/DL — SIGNIFICANT CHANGE UP (ref 0.5–1.3)
CREAT SERPL-MCNC: 0.93 MG/DL — SIGNIFICANT CHANGE UP (ref 0.5–1.3)
CREAT SERPL-MCNC: 1.02 MG/DL — SIGNIFICANT CHANGE UP (ref 0.5–1.3)
CREAT SERPL-MCNC: 1.03 MG/DL — SIGNIFICANT CHANGE UP (ref 0.5–1.3)
CREAT SERPL-MCNC: 1.05 MG/DL — SIGNIFICANT CHANGE UP (ref 0.5–1.3)
CREAT SERPL-MCNC: 1.1 MG/DL — SIGNIFICANT CHANGE UP (ref 0.5–1.3)
CREAT SERPL-MCNC: 1.16 MG/DL — SIGNIFICANT CHANGE UP (ref 0.5–1.3)
CULTURE RESULTS: SIGNIFICANT CHANGE UP
CULTURE RESULTS: SIGNIFICANT CHANGE UP
EOSINOPHIL # BLD AUTO: 0 K/UL — SIGNIFICANT CHANGE UP (ref 0–0.5)
EOSINOPHIL NFR BLD AUTO: 0 % — SIGNIFICANT CHANGE UP (ref 0–6)
FLUAV H1 2009 PAND RNA SPEC QL NAA+PROBE: SIGNIFICANT CHANGE UP
FLUAV H1 RNA SPEC QL NAA+PROBE: SIGNIFICANT CHANGE UP
FLUAV H3 RNA SPEC QL NAA+PROBE: SIGNIFICANT CHANGE UP
FLUAV SUBTYP SPEC NAA+PROBE: SIGNIFICANT CHANGE UP
FLUBV RNA SPEC QL NAA+PROBE: SIGNIFICANT CHANGE UP
GLUCOSE BLDC GLUCOMTR-MCNC: 123 MG/DL — HIGH (ref 70–99)
GLUCOSE BLDC GLUCOMTR-MCNC: 127 MG/DL — HIGH (ref 70–99)
GLUCOSE BLDC GLUCOMTR-MCNC: 135 MG/DL — HIGH (ref 70–99)
GLUCOSE BLDC GLUCOMTR-MCNC: 138 MG/DL — HIGH (ref 70–99)
GLUCOSE BLDC GLUCOMTR-MCNC: 142 MG/DL — HIGH (ref 70–99)
GLUCOSE BLDC GLUCOMTR-MCNC: 180 MG/DL — HIGH (ref 70–99)
GLUCOSE BLDC GLUCOMTR-MCNC: 194 MG/DL — HIGH (ref 70–99)
GLUCOSE BLDC GLUCOMTR-MCNC: 196 MG/DL — HIGH (ref 70–99)
GLUCOSE BLDC GLUCOMTR-MCNC: 202 MG/DL — HIGH (ref 70–99)
GLUCOSE SERPL-MCNC: 104 MG/DL — HIGH (ref 70–99)
GLUCOSE SERPL-MCNC: 133 MG/DL — HIGH (ref 70–99)
GLUCOSE SERPL-MCNC: 135 MG/DL — HIGH (ref 70–99)
GLUCOSE SERPL-MCNC: 144 MG/DL — HIGH (ref 70–99)
GLUCOSE SERPL-MCNC: 150 MG/DL — HIGH (ref 70–99)
GLUCOSE SERPL-MCNC: 150 MG/DL — HIGH (ref 70–99)
GLUCOSE SERPL-MCNC: 160 MG/DL — HIGH (ref 70–99)
GLUCOSE SERPL-MCNC: 169 MG/DL — HIGH (ref 70–99)
HADV DNA SPEC QL NAA+PROBE: SIGNIFICANT CHANGE UP
HBA1C BLD-MCNC: 6.4 % — HIGH (ref 4–5.6)
HCOV PNL SPEC NAA+PROBE: SIGNIFICANT CHANGE UP
HCT VFR BLD CALC: 31.1 % — LOW (ref 39–50)
HCT VFR BLD CALC: 31.5 % — LOW (ref 39–50)
HCT VFR BLD CALC: 33.1 % — LOW (ref 39–50)
HCT VFR BLD CALC: 33.4 % — LOW (ref 39–50)
HCT VFR BLD CALC: 36.6 % — LOW (ref 39–50)
HCT VFR BLD CALC: 42.6 % — SIGNIFICANT CHANGE UP (ref 39–50)
HCT VFR BLD CALC: 42.7 % — SIGNIFICANT CHANGE UP (ref 39–50)
HGB BLD-MCNC: 10.2 G/DL — LOW (ref 13–17)
HGB BLD-MCNC: 10.3 G/DL — LOW (ref 13–17)
HGB BLD-MCNC: 10.6 G/DL — LOW (ref 13–17)
HGB BLD-MCNC: 10.8 G/DL — LOW (ref 13–17)
HGB BLD-MCNC: 11.9 G/DL — LOW (ref 13–17)
HGB BLD-MCNC: 13.4 G/DL — SIGNIFICANT CHANGE UP (ref 13–17)
HGB BLD-MCNC: 13.7 G/DL — SIGNIFICANT CHANGE UP (ref 13–17)
HMPV RNA SPEC QL NAA+PROBE: DETECTED
HPIV1 RNA SPEC QL NAA+PROBE: SIGNIFICANT CHANGE UP
HPIV2 RNA SPEC QL NAA+PROBE: SIGNIFICANT CHANGE UP
HPIV3 RNA SPEC QL NAA+PROBE: SIGNIFICANT CHANGE UP
HPIV4 RNA SPEC QL NAA+PROBE: SIGNIFICANT CHANGE UP
IMM GRANULOCYTES NFR BLD AUTO: 0.5 % — SIGNIFICANT CHANGE UP (ref 0–1.5)
INR BLD: 2.04 RATIO — HIGH (ref 0.88–1.16)
INR BLD: 2.08 RATIO — HIGH (ref 0.88–1.16)
INR BLD: 2.3 RATIO — HIGH (ref 0.88–1.16)
INR BLD: 2.33 RATIO — HIGH (ref 0.88–1.16)
INR BLD: 2.44 RATIO — HIGH (ref 0.88–1.16)
INR BLD: 2.48 RATIO — HIGH (ref 0.88–1.16)
INR BLD: 2.52 RATIO — HIGH (ref 0.88–1.16)
INR BLD: 2.68 RATIO — HIGH (ref 0.88–1.16)
INR BLD: 2.73 RATIO — HIGH (ref 0.88–1.16)
INR BLD: 3.05 RATIO — HIGH (ref 0.88–1.16)
INR BLD: 3.12 RATIO — HIGH (ref 0.88–1.16)
INR BLD: 3.24 RATIO — HIGH (ref 0.88–1.16)
LEGIONELLA AG UR QL: NEGATIVE — SIGNIFICANT CHANGE UP
LYMPHOCYTES # BLD AUTO: 0.95 K/UL — LOW (ref 1–3.3)
LYMPHOCYTES # BLD AUTO: 9.6 % — LOW (ref 13–44)
MAGNESIUM SERPL-MCNC: 1.9 MG/DL — SIGNIFICANT CHANGE UP (ref 1.6–2.6)
MAGNESIUM SERPL-MCNC: 2 MG/DL — SIGNIFICANT CHANGE UP (ref 1.6–2.6)
MAGNESIUM SERPL-MCNC: 2.1 MG/DL — SIGNIFICANT CHANGE UP (ref 1.6–2.6)
MCHC RBC-ENTMCNC: 30.3 PG — SIGNIFICANT CHANGE UP (ref 27–34)
MCHC RBC-ENTMCNC: 30.7 PG — SIGNIFICANT CHANGE UP (ref 27–34)
MCHC RBC-ENTMCNC: 31 PG — SIGNIFICANT CHANGE UP (ref 27–34)
MCHC RBC-ENTMCNC: 31.3 PG — SIGNIFICANT CHANGE UP (ref 27–34)
MCHC RBC-ENTMCNC: 31.4 GM/DL — LOW (ref 32–36)
MCHC RBC-ENTMCNC: 31.5 PG — SIGNIFICANT CHANGE UP (ref 27–34)
MCHC RBC-ENTMCNC: 31.5 PG — SIGNIFICANT CHANGE UP (ref 27–34)
MCHC RBC-ENTMCNC: 31.9 PG — SIGNIFICANT CHANGE UP (ref 27–34)
MCHC RBC-ENTMCNC: 32 GM/DL — SIGNIFICANT CHANGE UP (ref 32–36)
MCHC RBC-ENTMCNC: 32.2 GM/DL — SIGNIFICANT CHANGE UP (ref 32–36)
MCHC RBC-ENTMCNC: 32.3 GM/DL — SIGNIFICANT CHANGE UP (ref 32–36)
MCHC RBC-ENTMCNC: 32.4 GM/DL — SIGNIFICANT CHANGE UP (ref 32–36)
MCHC RBC-ENTMCNC: 32.5 GM/DL — SIGNIFICANT CHANGE UP (ref 32–36)
MCHC RBC-ENTMCNC: 33.1 GM/DL — SIGNIFICANT CHANGE UP (ref 32–36)
MCV RBC AUTO: 95.3 FL — SIGNIFICANT CHANGE UP (ref 80–100)
MCV RBC AUTO: 95.5 FL — SIGNIFICANT CHANGE UP (ref 80–100)
MCV RBC AUTO: 96.3 FL — SIGNIFICANT CHANGE UP (ref 80–100)
MCV RBC AUTO: 96.6 FL — SIGNIFICANT CHANGE UP (ref 80–100)
MCV RBC AUTO: 97.2 FL — SIGNIFICANT CHANGE UP (ref 80–100)
MCV RBC AUTO: 97.4 FL — SIGNIFICANT CHANGE UP (ref 80–100)
MCV RBC AUTO: 97.6 FL — SIGNIFICANT CHANGE UP (ref 80–100)
MONOCYTES # BLD AUTO: 1.28 K/UL — HIGH (ref 0–0.9)
MONOCYTES NFR BLD AUTO: 13 % — SIGNIFICANT CHANGE UP (ref 2–14)
NEUTROPHILS # BLD AUTO: 7.57 K/UL — HIGH (ref 1.8–7.4)
NEUTROPHILS NFR BLD AUTO: 76.8 % — SIGNIFICANT CHANGE UP (ref 43–77)
NT-PROBNP SERPL-SCNC: 5208 PG/ML — HIGH (ref 0–300)
NT-PROBNP SERPL-SCNC: 6488 PG/ML — HIGH (ref 0–300)
PHOSPHATE SERPL-MCNC: 2.7 MG/DL — SIGNIFICANT CHANGE UP (ref 2.5–4.5)
PLATELET # BLD AUTO: 133 K/UL — LOW (ref 150–400)
PLATELET # BLD AUTO: 140 K/UL — LOW (ref 150–400)
PLATELET # BLD AUTO: 150 K/UL — SIGNIFICANT CHANGE UP (ref 150–400)
PLATELET # BLD AUTO: 160 K/UL — SIGNIFICANT CHANGE UP (ref 150–400)
PLATELET # BLD AUTO: 178 K/UL — SIGNIFICANT CHANGE UP (ref 150–400)
PLATELET # BLD AUTO: 214 K/UL — SIGNIFICANT CHANGE UP (ref 150–400)
PLATELET # BLD AUTO: 221 K/UL — SIGNIFICANT CHANGE UP (ref 150–400)
POTASSIUM SERPL-MCNC: 3.4 MMOL/L — LOW (ref 3.5–5.3)
POTASSIUM SERPL-MCNC: 3.4 MMOL/L — LOW (ref 3.5–5.3)
POTASSIUM SERPL-MCNC: 3.6 MMOL/L — SIGNIFICANT CHANGE UP (ref 3.5–5.3)
POTASSIUM SERPL-MCNC: 3.6 MMOL/L — SIGNIFICANT CHANGE UP (ref 3.5–5.3)
POTASSIUM SERPL-MCNC: 3.8 MMOL/L — SIGNIFICANT CHANGE UP (ref 3.5–5.3)
POTASSIUM SERPL-MCNC: 4.4 MMOL/L — SIGNIFICANT CHANGE UP (ref 3.5–5.3)
POTASSIUM SERPL-MCNC: 4.7 MMOL/L — SIGNIFICANT CHANGE UP (ref 3.5–5.3)
POTASSIUM SERPL-MCNC: 4.8 MMOL/L — SIGNIFICANT CHANGE UP (ref 3.5–5.3)
POTASSIUM SERPL-SCNC: 3.4 MMOL/L — LOW (ref 3.5–5.3)
POTASSIUM SERPL-SCNC: 3.4 MMOL/L — LOW (ref 3.5–5.3)
POTASSIUM SERPL-SCNC: 3.6 MMOL/L — SIGNIFICANT CHANGE UP (ref 3.5–5.3)
POTASSIUM SERPL-SCNC: 3.6 MMOL/L — SIGNIFICANT CHANGE UP (ref 3.5–5.3)
POTASSIUM SERPL-SCNC: 3.8 MMOL/L — SIGNIFICANT CHANGE UP (ref 3.5–5.3)
POTASSIUM SERPL-SCNC: 4.4 MMOL/L — SIGNIFICANT CHANGE UP (ref 3.5–5.3)
POTASSIUM SERPL-SCNC: 4.7 MMOL/L — SIGNIFICANT CHANGE UP (ref 3.5–5.3)
POTASSIUM SERPL-SCNC: 4.8 MMOL/L — SIGNIFICANT CHANGE UP (ref 3.5–5.3)
PROCALCITONIN SERPL-MCNC: 0.09 NG/ML — SIGNIFICANT CHANGE UP (ref 0.02–0.1)
PROCALCITONIN SERPL-MCNC: 0.54 NG/ML — HIGH (ref 0.02–0.1)
PROT SERPL-MCNC: 6 G/DL — SIGNIFICANT CHANGE UP (ref 6–8.3)
PROT SERPL-MCNC: 6.1 G/DL — SIGNIFICANT CHANGE UP (ref 6–8.3)
PROTHROM AB SERPL-ACNC: 23.8 SEC — HIGH (ref 10–12.9)
PROTHROM AB SERPL-ACNC: 24 SEC — HIGH (ref 10–13.1)
PROTHROM AB SERPL-ACNC: 27.2 SEC — HIGH (ref 10–13.1)
PROTHROM AB SERPL-ACNC: 27.3 SEC — HIGH (ref 10–13.1)
PROTHROM AB SERPL-ACNC: 28.6 SEC — HIGH (ref 10–13.1)
PROTHROM AB SERPL-ACNC: 29.3 SEC — HIGH (ref 10–13.1)
PROTHROM AB SERPL-ACNC: 29.6 SEC — HIGH (ref 10–13.1)
PROTHROM AB SERPL-ACNC: 31.8 SEC — HIGH (ref 10–13.1)
PROTHROM AB SERPL-ACNC: 31.8 SEC — HIGH (ref 10–13.1)
PROTHROM AB SERPL-ACNC: 36.3 SEC — HIGH (ref 10–13.1)
PROTHROM AB SERPL-ACNC: 36.8 SEC — HIGH (ref 10–13.1)
PROTHROM AB SERPL-ACNC: 38.3 SEC — HIGH (ref 10–13.1)
RAPID RVP RESULT: DETECTED
RBC # BLD: 3.23 M/UL — LOW (ref 4.2–5.8)
RBC # BLD: 3.24 M/UL — LOW (ref 4.2–5.8)
RBC # BLD: 3.39 M/UL — LOW (ref 4.2–5.8)
RBC # BLD: 3.43 M/UL — LOW (ref 4.2–5.8)
RBC # BLD: 3.84 M/UL — LOW (ref 4.2–5.8)
RBC # BLD: 4.42 M/UL — SIGNIFICANT CHANGE UP (ref 4.2–5.8)
RBC # BLD: 4.46 M/UL — SIGNIFICANT CHANGE UP (ref 4.2–5.8)
RBC # FLD: 13.2 % — SIGNIFICANT CHANGE UP (ref 10.3–14.5)
RBC # FLD: 13.4 % — SIGNIFICANT CHANGE UP (ref 10.3–14.5)
RBC # FLD: 13.5 % — SIGNIFICANT CHANGE UP (ref 10.3–14.5)
RBC # FLD: 13.9 % — SIGNIFICANT CHANGE UP (ref 10.3–14.5)
RBC # FLD: 14.1 % — SIGNIFICANT CHANGE UP (ref 10.3–14.5)
RBC # FLD: 14.2 % — SIGNIFICANT CHANGE UP (ref 10.3–14.5)
RBC # FLD: 14.4 % — SIGNIFICANT CHANGE UP (ref 10.3–14.5)
RV+EV RNA SPEC QL NAA+PROBE: SIGNIFICANT CHANGE UP
SODIUM SERPL-SCNC: 133 MMOL/L — LOW (ref 135–145)
SODIUM SERPL-SCNC: 133 MMOL/L — LOW (ref 135–145)
SODIUM SERPL-SCNC: 135 MMOL/L — SIGNIFICANT CHANGE UP (ref 135–145)
SODIUM SERPL-SCNC: 137 MMOL/L — SIGNIFICANT CHANGE UP (ref 135–145)
SODIUM SERPL-SCNC: 137 MMOL/L — SIGNIFICANT CHANGE UP (ref 135–145)
SODIUM SERPL-SCNC: 138 MMOL/L — SIGNIFICANT CHANGE UP (ref 135–145)
SODIUM SERPL-SCNC: 138 MMOL/L — SIGNIFICANT CHANGE UP (ref 135–145)
SODIUM SERPL-SCNC: 139 MMOL/L — SIGNIFICANT CHANGE UP (ref 135–145)
SPECIMEN SOURCE: SIGNIFICANT CHANGE UP
SPECIMEN SOURCE: SIGNIFICANT CHANGE UP
WBC # BLD: 10.8 K/UL — HIGH (ref 3.8–10.5)
WBC # BLD: 11.73 K/UL — HIGH (ref 3.8–10.5)
WBC # BLD: 11.96 K/UL — HIGH (ref 3.8–10.5)
WBC # BLD: 12.99 K/UL — HIGH (ref 3.8–10.5)
WBC # BLD: 20.64 K/UL — HIGH (ref 3.8–10.5)
WBC # BLD: 4.46 K/UL — SIGNIFICANT CHANGE UP (ref 3.8–10.5)
WBC # BLD: 9.86 K/UL — SIGNIFICANT CHANGE UP (ref 3.8–10.5)
WBC # FLD AUTO: 10.8 K/UL — HIGH (ref 3.8–10.5)
WBC # FLD AUTO: 11.73 K/UL — HIGH (ref 3.8–10.5)
WBC # FLD AUTO: 11.96 K/UL — HIGH (ref 3.8–10.5)
WBC # FLD AUTO: 12.99 K/UL — HIGH (ref 3.8–10.5)
WBC # FLD AUTO: 20.64 K/UL — HIGH (ref 3.8–10.5)
WBC # FLD AUTO: 4.46 K/UL — SIGNIFICANT CHANGE UP (ref 3.8–10.5)
WBC # FLD AUTO: 9.86 K/UL — SIGNIFICANT CHANGE UP (ref 3.8–10.5)

## 2020-01-01 PROCEDURE — 87040 BLOOD CULTURE FOR BACTERIA: CPT

## 2020-01-01 PROCEDURE — 51720 TREATMENT OF BLADDER LESION: CPT

## 2020-01-01 PROCEDURE — 85027 COMPLETE CBC AUTOMATED: CPT

## 2020-01-01 PROCEDURE — 87449 NOS EACH ORGANISM AG IA: CPT

## 2020-01-01 PROCEDURE — 87798 DETECT AGENT NOS DNA AMP: CPT

## 2020-01-01 PROCEDURE — 99223 1ST HOSP IP/OBS HIGH 75: CPT

## 2020-01-01 PROCEDURE — 99233 SBSQ HOSP IP/OBS HIGH 50: CPT

## 2020-01-01 PROCEDURE — 99232 SBSQ HOSP IP/OBS MODERATE 35: CPT

## 2020-01-01 PROCEDURE — 82962 GLUCOSE BLOOD TEST: CPT

## 2020-01-01 PROCEDURE — 93000 ELECTROCARDIOGRAM COMPLETE: CPT

## 2020-01-01 PROCEDURE — 71045 X-RAY EXAM CHEST 1 VIEW: CPT | Mod: 26

## 2020-01-01 PROCEDURE — 84100 ASSAY OF PHOSPHORUS: CPT

## 2020-01-01 PROCEDURE — 71045 X-RAY EXAM CHEST 1 VIEW: CPT

## 2020-01-01 PROCEDURE — 97161 PT EVAL LOW COMPLEX 20 MIN: CPT

## 2020-01-01 PROCEDURE — 85730 THROMBOPLASTIN TIME PARTIAL: CPT

## 2020-01-01 PROCEDURE — 71046 X-RAY EXAM CHEST 2 VIEWS: CPT

## 2020-01-01 PROCEDURE — 80048 BASIC METABOLIC PNL TOTAL CA: CPT

## 2020-01-01 PROCEDURE — 99233 SBSQ HOSP IP/OBS HIGH 50: CPT | Mod: GC

## 2020-01-01 PROCEDURE — 83735 ASSAY OF MAGNESIUM: CPT

## 2020-01-01 PROCEDURE — 71250 CT THORAX DX C-: CPT

## 2020-01-01 PROCEDURE — 99285 EMERGENCY DEPT VISIT HI MDM: CPT | Mod: 25

## 2020-01-01 PROCEDURE — 71250 CT THORAX DX C-: CPT | Mod: 26

## 2020-01-01 PROCEDURE — 87486 CHLMYD PNEUM DNA AMP PROBE: CPT

## 2020-01-01 PROCEDURE — 99215 OFFICE O/P EST HI 40 MIN: CPT

## 2020-01-01 PROCEDURE — 94640 AIRWAY INHALATION TREATMENT: CPT

## 2020-01-01 PROCEDURE — 84484 ASSAY OF TROPONIN QUANT: CPT

## 2020-01-01 PROCEDURE — 93010 ELECTROCARDIOGRAM REPORT: CPT

## 2020-01-01 PROCEDURE — 80053 COMPREHEN METABOLIC PANEL: CPT

## 2020-01-01 PROCEDURE — 93294 REM INTERROG EVL PM/LDLS PM: CPT

## 2020-01-01 PROCEDURE — 93005 ELECTROCARDIOGRAM TRACING: CPT

## 2020-01-01 PROCEDURE — 99239 HOSP IP/OBS DSCHRG MGMT >30: CPT

## 2020-01-01 PROCEDURE — 85610 PROTHROMBIN TIME: CPT

## 2020-01-01 PROCEDURE — 93296 REM INTERROG EVL PM/IDS: CPT

## 2020-01-01 PROCEDURE — 83036 HEMOGLOBIN GLYCOSYLATED A1C: CPT

## 2020-01-01 PROCEDURE — 97116 GAIT TRAINING THERAPY: CPT

## 2020-01-01 PROCEDURE — 84145 PROCALCITONIN (PCT): CPT

## 2020-01-01 PROCEDURE — 87581 M.PNEUMON DNA AMP PROBE: CPT

## 2020-01-01 PROCEDURE — 83880 ASSAY OF NATRIURETIC PEPTIDE: CPT

## 2020-01-01 PROCEDURE — 87631 RESP VIRUS 3-5 TARGETS: CPT

## 2020-01-01 PROCEDURE — 87633 RESP VIRUS 12-25 TARGETS: CPT

## 2020-01-01 RX ORDER — METOPROLOL TARTRATE 50 MG
1 TABLET ORAL
Qty: 0 | Refills: 0 | DISCHARGE

## 2020-01-01 RX ORDER — FUROSEMIDE 40 MG
1 TABLET ORAL
Qty: 60 | Refills: 0
Start: 2020-01-01 | End: 2020-01-01

## 2020-01-01 RX ORDER — DEXTROSE 50 % IN WATER 50 %
25 SYRINGE (ML) INTRAVENOUS ONCE
Refills: 0 | Status: DISCONTINUED | OUTPATIENT
Start: 2020-01-01 | End: 2020-01-01

## 2020-01-01 RX ORDER — FUROSEMIDE 40 MG
40 TABLET ORAL
Refills: 0 | Status: DISCONTINUED | OUTPATIENT
Start: 2020-01-01 | End: 2020-01-01

## 2020-01-01 RX ORDER — IPRATROPIUM BROMIDE 0.2 MG/ML
500 SOLUTION, NON-ORAL INHALATION ONCE
Refills: 0 | Status: COMPLETED | OUTPATIENT
Start: 2020-01-01 | End: 2020-01-01

## 2020-01-01 RX ORDER — SENNA PLUS 8.6 MG/1
2 TABLET ORAL AT BEDTIME
Refills: 0 | Status: DISCONTINUED | OUTPATIENT
Start: 2020-01-01 | End: 2020-01-01

## 2020-01-01 RX ORDER — CEFTRIAXONE 500 MG/1
1000 INJECTION, POWDER, FOR SOLUTION INTRAMUSCULAR; INTRAVENOUS ONCE
Refills: 0 | Status: COMPLETED | OUTPATIENT
Start: 2020-01-01 | End: 2020-01-01

## 2020-01-01 RX ORDER — ATORVASTATIN CALCIUM 80 MG/1
1 TABLET, FILM COATED ORAL
Qty: 0 | Refills: 0 | DISCHARGE
Start: 2020-01-01

## 2020-01-01 RX ORDER — METOPROLOL TARTRATE 50 MG
1 TABLET ORAL
Qty: 60 | Refills: 0
Start: 2020-01-01 | End: 2020-01-01

## 2020-01-01 RX ORDER — WARFARIN SODIUM 2.5 MG/1
4 TABLET ORAL ONCE
Refills: 0 | Status: COMPLETED | OUTPATIENT
Start: 2020-01-01 | End: 2020-01-01

## 2020-01-01 RX ORDER — LEVALBUTEROL 1.25 MG/.5ML
0.63 SOLUTION, CONCENTRATE RESPIRATORY (INHALATION) EVERY 6 HOURS
Refills: 0 | Status: DISCONTINUED | OUTPATIENT
Start: 2020-01-01 | End: 2020-01-01

## 2020-01-01 RX ORDER — SODIUM CHLORIDE 9 MG/ML
1000 INJECTION, SOLUTION INTRAVENOUS
Refills: 0 | Status: DISCONTINUED | OUTPATIENT
Start: 2020-01-01 | End: 2020-01-01

## 2020-01-01 RX ORDER — POLYETHYLENE GLYCOL 3350 17 G/17G
17 POWDER, FOR SOLUTION ORAL DAILY
Refills: 0 | Status: DISCONTINUED | OUTPATIENT
Start: 2020-01-01 | End: 2020-01-01

## 2020-01-01 RX ORDER — WARFARIN SODIUM 2.5 MG/1
5 TABLET ORAL ONCE
Refills: 0 | Status: DISCONTINUED | OUTPATIENT
Start: 2020-01-01 | End: 2020-01-01

## 2020-01-01 RX ORDER — FUROSEMIDE 40 MG
20 TABLET ORAL ONCE
Refills: 0 | Status: DISCONTINUED | OUTPATIENT
Start: 2020-01-01 | End: 2020-01-01

## 2020-01-01 RX ORDER — INSULIN LISPRO 100/ML
VIAL (ML) SUBCUTANEOUS
Refills: 0 | Status: DISCONTINUED | OUTPATIENT
Start: 2020-01-01 | End: 2020-01-01

## 2020-01-01 RX ORDER — MITOMYCIN 40 MG/80ML
40 INJECTION, POWDER, LYOPHILIZED, FOR SOLUTION INTRAVENOUS
Qty: 1 | Refills: 0 | Status: COMPLETED | OUTPATIENT
Start: 2020-01-01

## 2020-01-01 RX ORDER — LOSARTAN POTASSIUM 100 MG/1
1 TABLET, FILM COATED ORAL
Qty: 0 | Refills: 0 | DISCHARGE
Start: 2020-01-01

## 2020-01-01 RX ORDER — POLYETHYLENE GLYCOL 3350 17 G/17G
17 POWDER, FOR SOLUTION ORAL ONCE
Refills: 0 | Status: COMPLETED | OUTPATIENT
Start: 2020-01-01 | End: 2020-01-01

## 2020-01-01 RX ORDER — LEVALBUTEROL 1.25 MG/.5ML
0.63 SOLUTION, CONCENTRATE RESPIRATORY (INHALATION) EVERY 4 HOURS
Refills: 0 | Status: DISCONTINUED | OUTPATIENT
Start: 2020-01-01 | End: 2020-01-01

## 2020-01-01 RX ORDER — WARFARIN SODIUM 2.5 MG/1
3.5 TABLET ORAL ONCE
Refills: 0 | Status: COMPLETED | OUTPATIENT
Start: 2020-01-01 | End: 2020-01-01

## 2020-01-01 RX ORDER — POTASSIUM CHLORIDE 20 MEQ
40 PACKET (EA) ORAL ONCE
Refills: 0 | Status: COMPLETED | OUTPATIENT
Start: 2020-01-01 | End: 2020-01-01

## 2020-01-01 RX ORDER — DEXTROSE 50 % IN WATER 50 %
12.5 SYRINGE (ML) INTRAVENOUS ONCE
Refills: 0 | Status: DISCONTINUED | OUTPATIENT
Start: 2020-01-01 | End: 2020-01-01

## 2020-01-01 RX ORDER — FERROUS SULFATE 325(65) MG
325 TABLET ORAL
Qty: 0 | Refills: 0 | DISCHARGE

## 2020-01-01 RX ORDER — SODIUM CHLORIDE 9 MG/ML
4 INJECTION INTRAMUSCULAR; INTRAVENOUS; SUBCUTANEOUS EVERY 6 HOURS
Refills: 0 | Status: DISCONTINUED | OUTPATIENT
Start: 2020-01-01 | End: 2020-01-01

## 2020-01-01 RX ORDER — GLUCAGON INJECTION, SOLUTION 0.5 MG/.1ML
1 INJECTION, SOLUTION SUBCUTANEOUS ONCE
Refills: 0 | Status: DISCONTINUED | OUTPATIENT
Start: 2020-01-01 | End: 2020-01-01

## 2020-01-01 RX ORDER — ATORVASTATIN CALCIUM 80 MG/1
1 TABLET, FILM COATED ORAL
Qty: 0 | Refills: 0 | DISCHARGE

## 2020-01-01 RX ORDER — FUROSEMIDE 40 MG
1 TABLET ORAL
Qty: 0 | Refills: 0 | DISCHARGE

## 2020-01-01 RX ORDER — GUAIFENESIN 600 MG/1
600 TABLET, EXTENDED RELEASE ORAL
Refills: 0 | Status: ACTIVE | COMMUNITY

## 2020-01-01 RX ORDER — METOPROLOL TARTRATE 100 MG/1
100 TABLET, FILM COATED ORAL TWICE DAILY
Qty: 180 | Refills: 1 | Status: ACTIVE | COMMUNITY
Start: 1900-01-01 | End: 1900-01-01

## 2020-01-01 RX ORDER — METOPROLOL TARTRATE 50 MG
100 TABLET ORAL
Refills: 0 | Status: DISCONTINUED | OUTPATIENT
Start: 2020-01-01 | End: 2020-01-01

## 2020-01-01 RX ORDER — METOPROLOL SUCCINATE 50 MG/1
50 TABLET, EXTENDED RELEASE ORAL DAILY
Refills: 0 | Status: DISCONTINUED | COMMUNITY
Start: 2018-01-24 | End: 2020-01-01

## 2020-01-01 RX ORDER — FUROSEMIDE 40 MG
20 TABLET ORAL ONCE
Refills: 0 | Status: COMPLETED | OUTPATIENT
Start: 2020-01-01 | End: 2020-01-01

## 2020-01-01 RX ORDER — WARFARIN SODIUM 2.5 MG/1
3 TABLET ORAL ONCE
Refills: 0 | Status: COMPLETED | OUTPATIENT
Start: 2020-01-01 | End: 2020-01-01

## 2020-01-01 RX ORDER — LEVALBUTEROL 1.25 MG/.5ML
0.63 SOLUTION, CONCENTRATE RESPIRATORY (INHALATION) ONCE
Refills: 0 | Status: COMPLETED | OUTPATIENT
Start: 2020-01-01 | End: 2020-01-01

## 2020-01-01 RX ORDER — DEXTROSE 50 % IN WATER 50 %
15 SYRINGE (ML) INTRAVENOUS ONCE
Refills: 0 | Status: DISCONTINUED | OUTPATIENT
Start: 2020-01-01 | End: 2020-01-01

## 2020-01-01 RX ORDER — FERROUS SULFATE 325(65) MG
0 TABLET ORAL
Qty: 0 | Refills: 0 | DISCHARGE

## 2020-01-01 RX ORDER — LOSARTAN POTASSIUM 100 MG/1
1 TABLET, FILM COATED ORAL
Qty: 0 | Refills: 0 | DISCHARGE

## 2020-01-01 RX ORDER — MAGNESIUM SULFATE 500 MG/ML
2 VIAL (ML) INJECTION ONCE
Refills: 0 | Status: COMPLETED | OUTPATIENT
Start: 2020-01-01 | End: 2020-01-01

## 2020-01-01 RX ORDER — SENNOSIDES 8.6 MG/1
CAPSULE, GELATIN COATED ORAL
Refills: 0 | Status: ACTIVE | COMMUNITY

## 2020-01-01 RX ORDER — LEVALBUTEROL 1.25 MG/.5ML
0.63 SOLUTION, CONCENTRATE RESPIRATORY (INHALATION) EVERY 8 HOURS
Refills: 0 | Status: DISCONTINUED | OUTPATIENT
Start: 2020-01-01 | End: 2020-01-01

## 2020-01-01 RX ORDER — CEFTRIAXONE 500 MG/1
INJECTION, POWDER, FOR SOLUTION INTRAMUSCULAR; INTRAVENOUS
Refills: 0 | Status: DISCONTINUED | OUTPATIENT
Start: 2020-01-01 | End: 2020-01-01

## 2020-01-01 RX ORDER — SODIUM CHLORIDE 9 MG/ML
4 INJECTION INTRAMUSCULAR; INTRAVENOUS; SUBCUTANEOUS EVERY 4 HOURS
Refills: 0 | Status: DISCONTINUED | OUTPATIENT
Start: 2020-01-01 | End: 2020-01-01

## 2020-01-01 RX ORDER — SENNA PLUS 8.6 MG/1
2 TABLET ORAL
Qty: 60 | Refills: 0
Start: 2020-01-01 | End: 2020-01-01

## 2020-01-01 RX ORDER — FUROSEMIDE 40 MG
40 TABLET ORAL EVERY 12 HOURS
Refills: 0 | Status: DISCONTINUED | OUTPATIENT
Start: 2020-01-01 | End: 2020-01-01

## 2020-01-01 RX ADMIN — Medication 100 MILLIGRAM(S): at 05:16

## 2020-01-01 RX ADMIN — Medication 100 MILLIGRAM(S): at 03:25

## 2020-01-01 RX ADMIN — LEVALBUTEROL 0.63 MILLIGRAM(S): 1.25 SOLUTION, CONCENTRATE RESPIRATORY (INHALATION) at 21:26

## 2020-01-01 RX ADMIN — Medication 1: at 17:28

## 2020-01-01 RX ADMIN — Medication 600 MILLIGRAM(S): at 17:09

## 2020-01-01 RX ADMIN — Medication 40 MILLIEQUIVALENT(S): at 11:40

## 2020-01-01 RX ADMIN — SODIUM CHLORIDE 4 MILLILITER(S): 9 INJECTION INTRAMUSCULAR; INTRAVENOUS; SUBCUTANEOUS at 09:44

## 2020-01-01 RX ADMIN — Medication 100 MILLIGRAM(S): at 21:43

## 2020-01-01 RX ADMIN — Medication 20 MILLIGRAM(S): at 05:27

## 2020-01-01 RX ADMIN — WARFARIN SODIUM 3 MILLIGRAM(S): 2.5 TABLET ORAL at 21:26

## 2020-01-01 RX ADMIN — LEVALBUTEROL 0.63 MILLIGRAM(S): 1.25 SOLUTION, CONCENTRATE RESPIRATORY (INHALATION) at 10:37

## 2020-01-01 RX ADMIN — Medication 3: at 12:49

## 2020-01-01 RX ADMIN — Medication 100 MILLIGRAM(S): at 17:30

## 2020-01-01 RX ADMIN — Medication 0.12 MILLIGRAM(S): at 11:40

## 2020-01-01 RX ADMIN — SODIUM CHLORIDE 4 MILLILITER(S): 9 INJECTION INTRAMUSCULAR; INTRAVENOUS; SUBCUTANEOUS at 17:46

## 2020-01-01 RX ADMIN — TAMSULOSIN HYDROCHLORIDE 0.8 MILLIGRAM(S): 0.4 CAPSULE ORAL at 21:17

## 2020-01-01 RX ADMIN — Medication 1: at 17:05

## 2020-01-01 RX ADMIN — LEVALBUTEROL 0.63 MILLIGRAM(S): 1.25 SOLUTION, CONCENTRATE RESPIRATORY (INHALATION) at 09:51

## 2020-01-01 RX ADMIN — LEVALBUTEROL 0.63 MILLIGRAM(S): 1.25 SOLUTION, CONCENTRATE RESPIRATORY (INHALATION) at 17:33

## 2020-01-01 RX ADMIN — LOSARTAN POTASSIUM 100 MILLIGRAM(S): 100 TABLET, FILM COATED ORAL at 05:59

## 2020-01-01 RX ADMIN — Medication 200 MILLIGRAM(S): at 03:25

## 2020-01-01 RX ADMIN — LEVALBUTEROL 0.63 MILLIGRAM(S): 1.25 SOLUTION, CONCENTRATE RESPIRATORY (INHALATION) at 05:38

## 2020-01-01 RX ADMIN — Medication 0.12 MILLIGRAM(S): at 12:20

## 2020-01-01 RX ADMIN — Medication 0.12 MILLIGRAM(S): at 18:24

## 2020-01-01 RX ADMIN — Medication 600 MILLIGRAM(S): at 18:23

## 2020-01-01 RX ADMIN — LEVALBUTEROL 0.63 MILLIGRAM(S): 1.25 SOLUTION, CONCENTRATE RESPIRATORY (INHALATION) at 13:23

## 2020-01-01 RX ADMIN — LEVALBUTEROL 0.63 MILLIGRAM(S): 1.25 SOLUTION, CONCENTRATE RESPIRATORY (INHALATION) at 06:17

## 2020-01-01 RX ADMIN — LEVALBUTEROL 0.63 MILLIGRAM(S): 1.25 SOLUTION, CONCENTRATE RESPIRATORY (INHALATION) at 00:35

## 2020-01-01 RX ADMIN — LEVALBUTEROL 0.63 MILLIGRAM(S): 1.25 SOLUTION, CONCENTRATE RESPIRATORY (INHALATION) at 11:35

## 2020-01-01 RX ADMIN — LEVALBUTEROL 0.63 MILLIGRAM(S): 1.25 SOLUTION, CONCENTRATE RESPIRATORY (INHALATION) at 17:13

## 2020-01-01 RX ADMIN — SENNA PLUS 2 TABLET(S): 8.6 TABLET ORAL at 21:17

## 2020-01-01 RX ADMIN — MITOMYCIN 0 MG: 40 INJECTION, POWDER, LYOPHILIZED, FOR SOLUTION INTRAVENOUS at 00:00

## 2020-01-01 RX ADMIN — SODIUM CHLORIDE 4 MILLILITER(S): 9 INJECTION INTRAMUSCULAR; INTRAVENOUS; SUBCUTANEOUS at 21:42

## 2020-01-01 RX ADMIN — Medication 40 MILLIGRAM(S): at 17:18

## 2020-01-01 RX ADMIN — ATORVASTATIN CALCIUM 10 MILLIGRAM(S): 80 TABLET, FILM COATED ORAL at 21:17

## 2020-01-01 RX ADMIN — Medication 40 MILLIGRAM(S): at 05:12

## 2020-01-01 RX ADMIN — SODIUM CHLORIDE 4 MILLILITER(S): 9 INJECTION INTRAMUSCULAR; INTRAVENOUS; SUBCUTANEOUS at 02:00

## 2020-01-01 RX ADMIN — Medication 40 MILLIEQUIVALENT(S): at 09:28

## 2020-01-01 RX ADMIN — Medication 600 MILLIGRAM(S): at 17:19

## 2020-01-01 RX ADMIN — Medication 0.12 MILLIGRAM(S): at 11:13

## 2020-01-01 RX ADMIN — Medication 1: at 12:20

## 2020-01-01 RX ADMIN — WARFARIN SODIUM 4 MILLIGRAM(S): 2.5 TABLET ORAL at 21:43

## 2020-01-01 RX ADMIN — WARFARIN SODIUM 3 MILLIGRAM(S): 2.5 TABLET ORAL at 21:19

## 2020-01-01 RX ADMIN — Medication 40 MILLIGRAM(S): at 05:16

## 2020-01-01 RX ADMIN — SODIUM CHLORIDE 4 MILLILITER(S): 9 INJECTION INTRAMUSCULAR; INTRAVENOUS; SUBCUTANEOUS at 13:24

## 2020-01-01 RX ADMIN — Medication 50 MILLIGRAM(S): at 17:12

## 2020-01-01 RX ADMIN — Medication 1: at 17:19

## 2020-01-01 RX ADMIN — Medication 20 MILLIGRAM(S): at 05:33

## 2020-01-01 RX ADMIN — Medication 100 MILLIGRAM(S): at 21:17

## 2020-01-01 RX ADMIN — TAMSULOSIN HYDROCHLORIDE 0.8 MILLIGRAM(S): 0.4 CAPSULE ORAL at 21:12

## 2020-01-01 RX ADMIN — SODIUM CHLORIDE 4 MILLILITER(S): 9 INJECTION INTRAMUSCULAR; INTRAVENOUS; SUBCUTANEOUS at 05:16

## 2020-01-01 RX ADMIN — LOSARTAN POTASSIUM 100 MILLIGRAM(S): 100 TABLET, FILM COATED ORAL at 06:14

## 2020-01-01 RX ADMIN — LEVALBUTEROL 0.63 MILLIGRAM(S): 1.25 SOLUTION, CONCENTRATE RESPIRATORY (INHALATION) at 12:09

## 2020-01-01 RX ADMIN — SODIUM CHLORIDE 4 MILLILITER(S): 9 INJECTION INTRAMUSCULAR; INTRAVENOUS; SUBCUTANEOUS at 13:34

## 2020-01-01 RX ADMIN — SODIUM CHLORIDE 4 MILLILITER(S): 9 INJECTION INTRAMUSCULAR; INTRAVENOUS; SUBCUTANEOUS at 06:14

## 2020-01-01 RX ADMIN — Medication 100 MILLIGRAM(S): at 17:13

## 2020-01-01 RX ADMIN — Medication 100 MILLIGRAM(S): at 17:32

## 2020-01-01 RX ADMIN — LEVALBUTEROL 0.63 MILLIGRAM(S): 1.25 SOLUTION, CONCENTRATE RESPIRATORY (INHALATION) at 13:58

## 2020-01-01 RX ADMIN — Medication 5 MILLIGRAM(S): at 21:16

## 2020-01-01 RX ADMIN — SODIUM CHLORIDE 4 MILLILITER(S): 9 INJECTION INTRAMUSCULAR; INTRAVENOUS; SUBCUTANEOUS at 12:09

## 2020-01-01 RX ADMIN — Medication 40 MILLIGRAM(S): at 04:45

## 2020-01-01 RX ADMIN — Medication 600 MILLIGRAM(S): at 17:18

## 2020-01-01 RX ADMIN — TAMSULOSIN HYDROCHLORIDE 0.8 MILLIGRAM(S): 0.4 CAPSULE ORAL at 21:24

## 2020-01-01 RX ADMIN — Medication 40 MILLIGRAM(S): at 06:14

## 2020-01-01 RX ADMIN — LEVALBUTEROL 0.63 MILLIGRAM(S): 1.25 SOLUTION, CONCENTRATE RESPIRATORY (INHALATION) at 17:19

## 2020-01-01 RX ADMIN — SODIUM CHLORIDE 4 MILLILITER(S): 9 INJECTION INTRAMUSCULAR; INTRAVENOUS; SUBCUTANEOUS at 17:14

## 2020-01-01 RX ADMIN — Medication 40 MILLIGRAM(S): at 05:59

## 2020-01-01 RX ADMIN — LEVALBUTEROL 0.63 MILLIGRAM(S): 1.25 SOLUTION, CONCENTRATE RESPIRATORY (INHALATION) at 01:37

## 2020-01-01 RX ADMIN — SODIUM CHLORIDE 4 MILLILITER(S): 9 INJECTION INTRAMUSCULAR; INTRAVENOUS; SUBCUTANEOUS at 17:33

## 2020-01-01 RX ADMIN — LEVALBUTEROL 0.63 MILLIGRAM(S): 1.25 SOLUTION, CONCENTRATE RESPIRATORY (INHALATION) at 05:19

## 2020-01-01 RX ADMIN — Medication 40 MILLIGRAM(S): at 14:39

## 2020-01-01 RX ADMIN — Medication 100 MILLIGRAM(S): at 05:59

## 2020-01-01 RX ADMIN — Medication 1: at 12:16

## 2020-01-01 RX ADMIN — TAMSULOSIN HYDROCHLORIDE 0.8 MILLIGRAM(S): 0.4 CAPSULE ORAL at 21:21

## 2020-01-01 RX ADMIN — Medication 20 MILLIGRAM(S): at 06:09

## 2020-01-01 RX ADMIN — SODIUM CHLORIDE 4 MILLILITER(S): 9 INJECTION INTRAMUSCULAR; INTRAVENOUS; SUBCUTANEOUS at 21:18

## 2020-01-01 RX ADMIN — Medication 100 MILLIGRAM(S): at 17:22

## 2020-01-01 RX ADMIN — Medication 600 MILLIGRAM(S): at 04:45

## 2020-01-01 RX ADMIN — LEVALBUTEROL 0.63 MILLIGRAM(S): 1.25 SOLUTION, CONCENTRATE RESPIRATORY (INHALATION) at 03:25

## 2020-01-01 RX ADMIN — LOSARTAN POTASSIUM 100 MILLIGRAM(S): 100 TABLET, FILM COATED ORAL at 04:45

## 2020-01-01 RX ADMIN — Medication 40 MILLIGRAM(S): at 05:10

## 2020-01-01 RX ADMIN — LEVALBUTEROL 0.63 MILLIGRAM(S): 1.25 SOLUTION, CONCENTRATE RESPIRATORY (INHALATION) at 17:12

## 2020-01-01 RX ADMIN — LEVALBUTEROL 0.63 MILLIGRAM(S): 1.25 SOLUTION, CONCENTRATE RESPIRATORY (INHALATION) at 00:54

## 2020-01-01 RX ADMIN — Medication 2: at 16:34

## 2020-01-01 RX ADMIN — Medication 40 MILLIGRAM(S): at 17:32

## 2020-01-01 RX ADMIN — SODIUM CHLORIDE 4 MILLILITER(S): 9 INJECTION INTRAMUSCULAR; INTRAVENOUS; SUBCUTANEOUS at 13:58

## 2020-01-01 RX ADMIN — SODIUM CHLORIDE 4 MILLILITER(S): 9 INJECTION INTRAMUSCULAR; INTRAVENOUS; SUBCUTANEOUS at 09:29

## 2020-01-01 RX ADMIN — LEVALBUTEROL 0.63 MILLIGRAM(S): 1.25 SOLUTION, CONCENTRATE RESPIRATORY (INHALATION) at 13:12

## 2020-01-01 RX ADMIN — Medication 650 MILLIGRAM(S): at 06:01

## 2020-01-01 RX ADMIN — SODIUM CHLORIDE 4 MILLILITER(S): 9 INJECTION INTRAMUSCULAR; INTRAVENOUS; SUBCUTANEOUS at 14:01

## 2020-01-01 RX ADMIN — Medication 600 MILLIGRAM(S): at 06:14

## 2020-01-01 RX ADMIN — Medication 100 MILLIGRAM(S): at 05:12

## 2020-01-01 RX ADMIN — LOSARTAN POTASSIUM 100 MILLIGRAM(S): 100 TABLET, FILM COATED ORAL at 05:10

## 2020-01-01 RX ADMIN — Medication 100 MILLIGRAM(S): at 05:40

## 2020-01-01 RX ADMIN — LEVALBUTEROL 0.63 MILLIGRAM(S): 1.25 SOLUTION, CONCENTRATE RESPIRATORY (INHALATION) at 11:40

## 2020-01-01 RX ADMIN — Medication 40 MILLIGRAM(S): at 17:06

## 2020-01-01 RX ADMIN — LEVALBUTEROL 0.63 MILLIGRAM(S): 1.25 SOLUTION, CONCENTRATE RESPIRATORY (INHALATION) at 21:12

## 2020-01-01 RX ADMIN — LEVALBUTEROL 0.63 MILLIGRAM(S): 1.25 SOLUTION, CONCENTRATE RESPIRATORY (INHALATION) at 13:34

## 2020-01-01 RX ADMIN — ATORVASTATIN CALCIUM 10 MILLIGRAM(S): 80 TABLET, FILM COATED ORAL at 21:03

## 2020-01-01 RX ADMIN — POLYETHYLENE GLYCOL 3350 17 GRAM(S): 17 POWDER, FOR SOLUTION ORAL at 12:19

## 2020-01-01 RX ADMIN — Medication 40 MILLIGRAM(S): at 13:30

## 2020-01-01 RX ADMIN — Medication 40 MILLIGRAM(S): at 17:29

## 2020-01-01 RX ADMIN — LEVALBUTEROL 0.63 MILLIGRAM(S): 1.25 SOLUTION, CONCENTRATE RESPIRATORY (INHALATION) at 22:02

## 2020-01-01 RX ADMIN — Medication 600 MILLIGRAM(S): at 17:06

## 2020-01-01 RX ADMIN — LEVALBUTEROL 0.63 MILLIGRAM(S): 1.25 SOLUTION, CONCENTRATE RESPIRATORY (INHALATION) at 21:20

## 2020-01-01 RX ADMIN — LEVALBUTEROL 0.63 MILLIGRAM(S): 1.25 SOLUTION, CONCENTRATE RESPIRATORY (INHALATION) at 05:10

## 2020-01-01 RX ADMIN — LEVALBUTEROL 0.63 MILLIGRAM(S): 1.25 SOLUTION, CONCENTRATE RESPIRATORY (INHALATION) at 10:04

## 2020-01-01 RX ADMIN — SODIUM CHLORIDE 4 MILLILITER(S): 9 INJECTION INTRAMUSCULAR; INTRAVENOUS; SUBCUTANEOUS at 02:50

## 2020-01-01 RX ADMIN — Medication 100 MILLIGRAM(S): at 17:28

## 2020-01-01 RX ADMIN — LEVALBUTEROL 0.63 MILLIGRAM(S): 1.25 SOLUTION, CONCENTRATE RESPIRATORY (INHALATION) at 05:12

## 2020-01-01 RX ADMIN — WARFARIN SODIUM 4 MILLIGRAM(S): 2.5 TABLET ORAL at 21:25

## 2020-01-01 RX ADMIN — LEVALBUTEROL 0.63 MILLIGRAM(S): 1.25 SOLUTION, CONCENTRATE RESPIRATORY (INHALATION) at 05:40

## 2020-01-01 RX ADMIN — Medication 200 MILLIGRAM(S): at 10:37

## 2020-01-01 RX ADMIN — SODIUM CHLORIDE 4 MILLILITER(S): 9 INJECTION INTRAMUSCULAR; INTRAVENOUS; SUBCUTANEOUS at 00:54

## 2020-01-01 RX ADMIN — Medication 1: at 17:36

## 2020-01-01 RX ADMIN — WARFARIN SODIUM 4 MILLIGRAM(S): 2.5 TABLET ORAL at 21:16

## 2020-01-01 RX ADMIN — LEVALBUTEROL 0.63 MILLIGRAM(S): 1.25 SOLUTION, CONCENTRATE RESPIRATORY (INHALATION) at 04:21

## 2020-01-01 RX ADMIN — SODIUM CHLORIDE 4 MILLILITER(S): 9 INJECTION INTRAMUSCULAR; INTRAVENOUS; SUBCUTANEOUS at 01:11

## 2020-01-01 RX ADMIN — ATORVASTATIN CALCIUM 10 MILLIGRAM(S): 80 TABLET, FILM COATED ORAL at 21:43

## 2020-01-01 RX ADMIN — MITOMYCIN 1 MG: 40 INJECTION, POWDER, LYOPHILIZED, FOR SOLUTION INTRAVENOUS at 00:00

## 2020-01-01 RX ADMIN — TAMSULOSIN HYDROCHLORIDE 0.8 MILLIGRAM(S): 0.4 CAPSULE ORAL at 21:19

## 2020-01-01 RX ADMIN — TAMSULOSIN HYDROCHLORIDE 0.8 MILLIGRAM(S): 0.4 CAPSULE ORAL at 21:03

## 2020-01-01 RX ADMIN — Medication 40 MILLIGRAM(S): at 05:37

## 2020-01-01 RX ADMIN — WARFARIN SODIUM 3.5 MILLIGRAM(S): 2.5 TABLET ORAL at 21:12

## 2020-01-01 RX ADMIN — LEVALBUTEROL 0.63 MILLIGRAM(S): 1.25 SOLUTION, CONCENTRATE RESPIRATORY (INHALATION) at 02:00

## 2020-01-01 RX ADMIN — LEVALBUTEROL 0.63 MILLIGRAM(S): 1.25 SOLUTION, CONCENTRATE RESPIRATORY (INHALATION) at 00:38

## 2020-01-01 RX ADMIN — SODIUM CHLORIDE 4 MILLILITER(S): 9 INJECTION INTRAMUSCULAR; INTRAVENOUS; SUBCUTANEOUS at 21:01

## 2020-01-01 RX ADMIN — LEVALBUTEROL 0.63 MILLIGRAM(S): 1.25 SOLUTION, CONCENTRATE RESPIRATORY (INHALATION) at 06:14

## 2020-01-01 RX ADMIN — LEVALBUTEROL 0.63 MILLIGRAM(S): 1.25 SOLUTION, CONCENTRATE RESPIRATORY (INHALATION) at 15:45

## 2020-01-01 RX ADMIN — Medication 600 MILLIGRAM(S): at 05:10

## 2020-01-01 RX ADMIN — ATORVASTATIN CALCIUM 10 MILLIGRAM(S): 80 TABLET, FILM COATED ORAL at 21:00

## 2020-01-01 RX ADMIN — SODIUM CHLORIDE 4 MILLILITER(S): 9 INJECTION INTRAMUSCULAR; INTRAVENOUS; SUBCUTANEOUS at 15:45

## 2020-01-01 RX ADMIN — Medication 600 MILLIGRAM(S): at 05:37

## 2020-01-01 RX ADMIN — LOSARTAN POTASSIUM 100 MILLIGRAM(S): 100 TABLET, FILM COATED ORAL at 05:33

## 2020-01-01 RX ADMIN — LEVALBUTEROL 0.63 MILLIGRAM(S): 1.25 SOLUTION, CONCENTRATE RESPIRATORY (INHALATION) at 21:00

## 2020-01-01 RX ADMIN — Medication 40 MILLIGRAM(S): at 05:13

## 2020-01-01 RX ADMIN — LEVALBUTEROL 0.63 MILLIGRAM(S): 1.25 SOLUTION, CONCENTRATE RESPIRATORY (INHALATION) at 17:28

## 2020-01-01 RX ADMIN — SODIUM CHLORIDE 4 MILLILITER(S): 9 INJECTION INTRAMUSCULAR; INTRAVENOUS; SUBCUTANEOUS at 10:29

## 2020-01-01 RX ADMIN — SODIUM CHLORIDE 4 MILLILITER(S): 9 INJECTION INTRAMUSCULAR; INTRAVENOUS; SUBCUTANEOUS at 05:13

## 2020-01-01 RX ADMIN — LEVALBUTEROL 0.63 MILLIGRAM(S): 1.25 SOLUTION, CONCENTRATE RESPIRATORY (INHALATION) at 05:03

## 2020-01-01 RX ADMIN — Medication 50 MILLIGRAM(S): at 05:33

## 2020-01-01 RX ADMIN — Medication 600 MILLIGRAM(S): at 17:13

## 2020-01-01 RX ADMIN — Medication 100 MILLIGRAM(S): at 11:35

## 2020-01-01 RX ADMIN — LEVALBUTEROL 0.63 MILLIGRAM(S): 1.25 SOLUTION, CONCENTRATE RESPIRATORY (INHALATION) at 04:42

## 2020-01-01 RX ADMIN — SODIUM CHLORIDE 4 MILLILITER(S): 9 INJECTION INTRAMUSCULAR; INTRAVENOUS; SUBCUTANEOUS at 17:06

## 2020-01-01 RX ADMIN — LEVALBUTEROL 0.63 MILLIGRAM(S): 1.25 SOLUTION, CONCENTRATE RESPIRATORY (INHALATION) at 18:23

## 2020-01-01 RX ADMIN — Medication 100 MILLIGRAM(S): at 17:18

## 2020-01-01 RX ADMIN — Medication 1: at 11:40

## 2020-01-01 RX ADMIN — SODIUM CHLORIDE 4 MILLILITER(S): 9 INJECTION INTRAMUSCULAR; INTRAVENOUS; SUBCUTANEOUS at 05:11

## 2020-01-01 RX ADMIN — Medication 40 MILLIGRAM(S): at 17:28

## 2020-01-01 RX ADMIN — TAMSULOSIN HYDROCHLORIDE 0.8 MILLIGRAM(S): 0.4 CAPSULE ORAL at 21:43

## 2020-01-01 RX ADMIN — ATORVASTATIN CALCIUM 10 MILLIGRAM(S): 80 TABLET, FILM COATED ORAL at 21:27

## 2020-01-01 RX ADMIN — LOSARTAN POTASSIUM 100 MILLIGRAM(S): 100 TABLET, FILM COATED ORAL at 05:27

## 2020-01-01 RX ADMIN — Medication 100 MILLIGRAM(S): at 17:06

## 2020-01-01 RX ADMIN — Medication 100 MILLIGRAM(S): at 13:58

## 2020-01-01 RX ADMIN — SODIUM CHLORIDE 4 MILLILITER(S): 9 INJECTION INTRAMUSCULAR; INTRAVENOUS; SUBCUTANEOUS at 18:23

## 2020-01-01 RX ADMIN — ATORVASTATIN CALCIUM 10 MILLIGRAM(S): 80 TABLET, FILM COATED ORAL at 21:12

## 2020-01-01 RX ADMIN — Medication 500 MICROGRAM(S): at 04:37

## 2020-01-01 RX ADMIN — Medication 20 MILLIGRAM(S): at 17:12

## 2020-01-01 RX ADMIN — POLYETHYLENE GLYCOL 3350 17 GRAM(S): 17 POWDER, FOR SOLUTION ORAL at 11:45

## 2020-01-01 RX ADMIN — Medication 50 MILLIGRAM(S): at 06:10

## 2020-01-01 RX ADMIN — Medication 2: at 12:15

## 2020-01-01 RX ADMIN — Medication 100 MILLIGRAM(S): at 05:10

## 2020-01-01 RX ADMIN — LEVALBUTEROL 0.63 MILLIGRAM(S): 1.25 SOLUTION, CONCENTRATE RESPIRATORY (INHALATION) at 02:50

## 2020-01-01 RX ADMIN — LEVALBUTEROL 0.63 MILLIGRAM(S): 1.25 SOLUTION, CONCENTRATE RESPIRATORY (INHALATION) at 17:18

## 2020-01-01 RX ADMIN — LEVALBUTEROL 0.63 MILLIGRAM(S): 1.25 SOLUTION, CONCENTRATE RESPIRATORY (INHALATION) at 05:32

## 2020-01-01 RX ADMIN — ATORVASTATIN CALCIUM 10 MILLIGRAM(S): 80 TABLET, FILM COATED ORAL at 21:21

## 2020-01-01 RX ADMIN — SODIUM CHLORIDE 4 MILLILITER(S): 9 INJECTION INTRAMUSCULAR; INTRAVENOUS; SUBCUTANEOUS at 14:35

## 2020-01-01 RX ADMIN — Medication 600 MILLIGRAM(S): at 17:28

## 2020-01-01 RX ADMIN — SODIUM CHLORIDE 4 MILLILITER(S): 9 INJECTION INTRAMUSCULAR; INTRAVENOUS; SUBCUTANEOUS at 21:20

## 2020-01-01 RX ADMIN — Medication 2: at 12:19

## 2020-01-01 RX ADMIN — LEVALBUTEROL 0.63 MILLIGRAM(S): 1.25 SOLUTION, CONCENTRATE RESPIRATORY (INHALATION) at 21:42

## 2020-01-01 RX ADMIN — SODIUM CHLORIDE 4 MILLILITER(S): 9 INJECTION INTRAMUSCULAR; INTRAVENOUS; SUBCUTANEOUS at 01:38

## 2020-01-01 RX ADMIN — Medication 600 MILLIGRAM(S): at 05:12

## 2020-01-01 RX ADMIN — LOSARTAN POTASSIUM 100 MILLIGRAM(S): 100 TABLET, FILM COATED ORAL at 05:39

## 2020-01-01 RX ADMIN — Medication 100 MILLIGRAM(S): at 17:29

## 2020-01-01 RX ADMIN — LEVALBUTEROL 0.63 MILLIGRAM(S): 1.25 SOLUTION, CONCENTRATE RESPIRATORY (INHALATION) at 21:17

## 2020-01-01 RX ADMIN — Medication 125 MILLIGRAM(S): at 05:00

## 2020-01-01 RX ADMIN — ATORVASTATIN CALCIUM 10 MILLIGRAM(S): 80 TABLET, FILM COATED ORAL at 21:19

## 2020-01-01 RX ADMIN — LEVALBUTEROL 0.63 MILLIGRAM(S): 1.25 SOLUTION, CONCENTRATE RESPIRATORY (INHALATION) at 14:01

## 2020-01-01 RX ADMIN — LEVALBUTEROL 0.63 MILLIGRAM(S): 1.25 SOLUTION, CONCENTRATE RESPIRATORY (INHALATION) at 21:19

## 2020-01-01 RX ADMIN — Medication 0.12 MILLIGRAM(S): at 11:06

## 2020-01-01 RX ADMIN — Medication 650 MILLIGRAM(S): at 13:07

## 2020-01-01 RX ADMIN — Medication 50 MILLIGRAM(S): at 17:09

## 2020-01-01 RX ADMIN — SODIUM CHLORIDE 4 MILLILITER(S): 9 INJECTION INTRAMUSCULAR; INTRAVENOUS; SUBCUTANEOUS at 21:03

## 2020-01-01 RX ADMIN — LEVALBUTEROL 0.63 MILLIGRAM(S): 1.25 SOLUTION, CONCENTRATE RESPIRATORY (INHALATION) at 01:12

## 2020-01-01 RX ADMIN — SODIUM CHLORIDE 4 MILLILITER(S): 9 INJECTION INTRAMUSCULAR; INTRAVENOUS; SUBCUTANEOUS at 05:40

## 2020-01-01 RX ADMIN — LEVALBUTEROL 0.63 MILLIGRAM(S): 1.25 SOLUTION, CONCENTRATE RESPIRATORY (INHALATION) at 05:59

## 2020-01-01 RX ADMIN — Medication 100 MILLIGRAM(S): at 12:20

## 2020-01-01 RX ADMIN — Medication 100 MILLIGRAM(S): at 04:45

## 2020-01-01 RX ADMIN — SENNA PLUS 2 TABLET(S): 8.6 TABLET ORAL at 21:43

## 2020-01-01 RX ADMIN — Medication 40 MILLIGRAM(S): at 17:13

## 2020-01-01 RX ADMIN — Medication 600 MILLIGRAM(S): at 17:32

## 2020-01-01 RX ADMIN — LOSARTAN POTASSIUM 100 MILLIGRAM(S): 100 TABLET, FILM COATED ORAL at 05:12

## 2020-01-01 RX ADMIN — Medication 650 MILLIGRAM(S): at 12:20

## 2020-01-01 RX ADMIN — Medication 4: at 17:17

## 2020-01-01 RX ADMIN — SODIUM CHLORIDE 4 MILLILITER(S): 9 INJECTION INTRAMUSCULAR; INTRAVENOUS; SUBCUTANEOUS at 01:50

## 2020-01-01 RX ADMIN — Medication 50 GRAM(S): at 04:37

## 2020-01-01 RX ADMIN — Medication 100 MILLIGRAM(S): at 17:09

## 2020-01-01 RX ADMIN — LOSARTAN POTASSIUM 100 MILLIGRAM(S): 100 TABLET, FILM COATED ORAL at 05:16

## 2020-01-01 RX ADMIN — Medication 40 MILLIGRAM(S): at 17:19

## 2020-01-01 RX ADMIN — Medication 600 MILLIGRAM(S): at 17:12

## 2020-01-01 RX ADMIN — Medication 1: at 12:08

## 2020-01-01 RX ADMIN — LEVALBUTEROL 0.63 MILLIGRAM(S): 1.25 SOLUTION, CONCENTRATE RESPIRATORY (INHALATION) at 13:30

## 2020-01-01 RX ADMIN — Medication 20 MILLIGRAM(S): at 17:10

## 2020-01-01 RX ADMIN — SODIUM CHLORIDE 4 MILLILITER(S): 9 INJECTION INTRAMUSCULAR; INTRAVENOUS; SUBCUTANEOUS at 17:18

## 2020-01-01 RX ADMIN — POLYETHYLENE GLYCOL 3350 17 GRAM(S): 17 POWDER, FOR SOLUTION ORAL at 11:55

## 2020-01-01 RX ADMIN — WARFARIN SODIUM 4 MILLIGRAM(S): 2.5 TABLET ORAL at 22:02

## 2020-01-01 RX ADMIN — Medication 40 MILLIGRAM(S): at 05:40

## 2020-01-01 RX ADMIN — LEVALBUTEROL 0.63 MILLIGRAM(S): 1.25 SOLUTION, CONCENTRATE RESPIRATORY (INHALATION) at 05:16

## 2020-01-01 RX ADMIN — Medication 600 MILLIGRAM(S): at 17:29

## 2020-01-01 RX ADMIN — ATORVASTATIN CALCIUM 10 MILLIGRAM(S): 80 TABLET, FILM COATED ORAL at 21:23

## 2020-01-01 RX ADMIN — Medication 0.12 MILLIGRAM(S): at 11:55

## 2020-01-01 RX ADMIN — Medication 100 MILLIGRAM(S): at 13:30

## 2020-01-01 RX ADMIN — Medication 50 MILLIGRAM(S): at 03:22

## 2020-01-01 RX ADMIN — LOSARTAN POTASSIUM 100 MILLIGRAM(S): 100 TABLET, FILM COATED ORAL at 05:40

## 2020-01-01 RX ADMIN — Medication 20 MILLIGRAM(S): at 05:07

## 2020-01-01 RX ADMIN — SODIUM CHLORIDE 4 MILLILITER(S): 9 INJECTION INTRAMUSCULAR; INTRAVENOUS; SUBCUTANEOUS at 05:19

## 2020-01-01 RX ADMIN — LEVALBUTEROL 0.63 MILLIGRAM(S): 1.25 SOLUTION, CONCENTRATE RESPIRATORY (INHALATION) at 01:50

## 2020-01-01 RX ADMIN — LEVALBUTEROL 0.63 MILLIGRAM(S): 1.25 SOLUTION, CONCENTRATE RESPIRATORY (INHALATION) at 10:29

## 2020-01-01 RX ADMIN — CEFTRIAXONE 100 MILLIGRAM(S): 500 INJECTION, POWDER, FOR SOLUTION INTRAMUSCULAR; INTRAVENOUS at 16:34

## 2020-01-01 RX ADMIN — SODIUM CHLORIDE 4 MILLILITER(S): 9 INJECTION INTRAMUSCULAR; INTRAVENOUS; SUBCUTANEOUS at 21:25

## 2020-01-01 RX ADMIN — LEVALBUTEROL 0.63 MILLIGRAM(S): 1.25 SOLUTION, CONCENTRATE RESPIRATORY (INHALATION) at 09:28

## 2020-01-01 RX ADMIN — LOSARTAN POTASSIUM 100 MILLIGRAM(S): 100 TABLET, FILM COATED ORAL at 06:10

## 2020-01-01 RX ADMIN — Medication 40 MILLIGRAM(S): at 17:17

## 2020-01-01 RX ADMIN — Medication 600 MILLIGRAM(S): at 06:00

## 2020-01-01 RX ADMIN — LEVALBUTEROL 0.63 MILLIGRAM(S): 1.25 SOLUTION, CONCENTRATE RESPIRATORY (INHALATION) at 17:27

## 2020-01-01 RX ADMIN — Medication 600 MILLIGRAM(S): at 05:33

## 2020-01-01 RX ADMIN — SODIUM CHLORIDE 4 MILLILITER(S): 9 INJECTION INTRAMUSCULAR; INTRAVENOUS; SUBCUTANEOUS at 05:59

## 2020-01-01 RX ADMIN — LEVALBUTEROL 0.63 MILLIGRAM(S): 1.25 SOLUTION, CONCENTRATE RESPIRATORY (INHALATION) at 17:06

## 2020-01-01 RX ADMIN — Medication 100 MILLIGRAM(S): at 08:18

## 2020-01-01 RX ADMIN — Medication 650 MILLIGRAM(S): at 07:30

## 2020-01-01 RX ADMIN — Medication 1: at 08:32

## 2020-01-01 RX ADMIN — Medication 100 MILLIGRAM(S): at 17:19

## 2020-01-01 RX ADMIN — Medication 1: at 08:18

## 2020-01-01 RX ADMIN — Medication 600 MILLIGRAM(S): at 06:10

## 2020-01-01 RX ADMIN — TAMSULOSIN HYDROCHLORIDE 0.8 MILLIGRAM(S): 0.4 CAPSULE ORAL at 21:27

## 2020-01-01 RX ADMIN — Medication 100 MILLIGRAM(S): at 05:39

## 2020-01-01 RX ADMIN — SODIUM CHLORIDE 4 MILLILITER(S): 9 INJECTION INTRAMUSCULAR; INTRAVENOUS; SUBCUTANEOUS at 22:02

## 2020-01-01 RX ADMIN — SODIUM CHLORIDE 4 MILLILITER(S): 9 INJECTION INTRAMUSCULAR; INTRAVENOUS; SUBCUTANEOUS at 23:41

## 2020-01-01 RX ADMIN — Medication 1: at 17:32

## 2020-01-01 RX ADMIN — LEVALBUTEROL 0.63 MILLIGRAM(S): 1.25 SOLUTION, CONCENTRATE RESPIRATORY (INHALATION) at 12:19

## 2020-01-01 RX ADMIN — SODIUM CHLORIDE 4 MILLILITER(S): 9 INJECTION INTRAMUSCULAR; INTRAVENOUS; SUBCUTANEOUS at 09:51

## 2020-01-01 RX ADMIN — TAMSULOSIN HYDROCHLORIDE 0.8 MILLIGRAM(S): 0.4 CAPSULE ORAL at 22:02

## 2020-01-01 RX ADMIN — Medication 600 MILLIGRAM(S): at 05:40

## 2020-01-01 RX ADMIN — LEVALBUTEROL 0.63 MILLIGRAM(S): 1.25 SOLUTION, CONCENTRATE RESPIRATORY (INHALATION) at 09:44

## 2020-01-01 RX ADMIN — Medication 600 MILLIGRAM(S): at 05:16

## 2020-01-01 RX ADMIN — ATORVASTATIN CALCIUM 10 MILLIGRAM(S): 80 TABLET, FILM COATED ORAL at 22:02

## 2020-01-01 RX ADMIN — TAMSULOSIN HYDROCHLORIDE 0.8 MILLIGRAM(S): 0.4 CAPSULE ORAL at 21:00

## 2020-01-01 RX ADMIN — LEVALBUTEROL 0.63 MILLIGRAM(S): 1.25 SOLUTION, CONCENTRATE RESPIRATORY (INHALATION) at 00:15

## 2020-01-01 RX ADMIN — Medication 40 MILLIGRAM(S): at 05:41

## 2020-01-01 NOTE — PROGRESS NOTE ADULT - PROBLEM SELECTOR PLAN 1
Multifactorial, likely viral URI given wheezing and cough however CXR w/o infiltrate and negative procalcitonin, small b/l pleural effusions, decompensated HFpEF, BNP 5200  - low suspicion for PE however INR was subtheuraputic, now 2.0  - trops flat, no acute ischemic EKG changes, cards following  - give IV solumedrol 40mg x 1, then prednisone 20mg x 4 days given wheezing despite nebs  -add Mucinex 600mg bid, c/w tessalon jennifer prn  - c/w Levalbuterol q4hr ATC  - diuresis as below  - monitor on telemetry  -if no improvement with symptoms can consider CT chest by torey

## 2020-01-01 NOTE — PROGRESS NOTE ADULT - PROBLEM SELECTOR PLAN 2
small b/l pleural effusions  -TTE 2/2019 with EF 50%, moderate to severe MR and moderate to severe TR with moderate pulmonary HTN  -c/w IV lasix 20mg q12 per cards  -monitor strict I/O, daily weight

## 2020-01-01 NOTE — PROGRESS NOTE ADULT - SUBJECTIVE AND OBJECTIVE BOX
[facility] - Preet Glover, Tere, Andreea, Greg Kamara Savella  Office Number:  [phone]    Patient resting comfortably in bed in NAD.  He is still short of breath.  He still feels unwell, but may have had mild improvement from yesterday.    F/U for:  CHF    Telemetry:  AF, rate controlled    MEDICATIONS  (STANDING):  atorvastatin 10 milliGRAM(s) Oral at bedtime  digoxin     Tablet 0.125 milliGRAM(s) Oral every other day  furosemide   Injectable 20 milliGRAM(s) IV Push every 12 hours  levalbuterol Inhalation 0.63 milliGRAM(s) Inhalation every 4 hours  losartan 100 milliGRAM(s) Oral daily  metoprolol tartrate 50 milliGRAM(s) Oral two times a day  tamsulosin 0.8 milliGRAM(s) Oral at bedtime    MEDICATIONS  (PRN):  acetaminophen   Tablet .. 650 milliGRAM(s) Oral every 4 hours PRN Mild Pain (1 - 3)  benzonatate 100 milliGRAM(s) Oral three times a day PRN Cough  guaiFENesin   Syrup  (Sugar-Free) 200 milliGRAM(s) Oral every 6 hours PRN Cough  levalbuterol Inhalation 0.63 milliGRAM(s) Inhalation every 6 hours PRN Wheeze/SOB      Allergies    No Known Allergies    Intolerances        Vital Signs Last 24 Hrs  T(C): 37.2 (01 Jan 2020 04:05), Max: 37.7 (01 Jan 2020 03:37)  T(F): 99 (01 Jan 2020 04:05), Max: 99.8 (01 Jan 2020 03:37)  HR: 84 (01 Jan 2020 04:05) (64 - 127)  BP: 137/74 (01 Jan 2020 04:05) (126/74 - 150/80)  BP(mean): --  RR: 17 (01 Jan 2020 04:05) (16 - 18)  SpO2: 92% (01 Jan 2020 04:05) (89% - 97%)    I&O's Summary    31 Dec 2019 07:01  -  01 Jan 2020 07:00  --------------------------------------------------------  IN: 1080 mL / OUT: 650 mL / NET: 430 mL        ON EXAM:    Constitutional: NAD, alert and oriented x 3  Lungs:  Non-labored, + exp wheeze and cough on breaths, No rales, though faint rhonchi.  Cardiovascular: RRR.  S1 and S2 positive.  No murmurs, rubs, gallops or clicks  Gastrointestinal: Bowel Sounds present, soft, nontender.   Lymph: right lower extremity peripheral edema. No cervical lymphadenopathy.  Neurological: Alert, no focal deficits  Skin: No rashes or ulcers   Psych:  Mood & affect appropriate.    LABS: All Labs Reviewed:                        9.9    6.15  )-----------( 140      ( 31 Dec 2019 08:44 )             31.3                         11.2   7.75  )-----------( 131      ( 30 Dec 2019 18:57 )             36.1     31 Dec 2019 05:54    140    |  102    |  20     ----------------------------<  170    3.8     |  24     |  0.93   30 Dec 2019 18:57    140    |  101    |  18     ----------------------------<  127    4.2     |  24     |  0.92     Ca    9.4        31 Dec 2019 05:54  Ca    9.8        30 Dec 2019 18:57    TPro  6.2    /  Alb  3.8    /  TBili  0.5    /  DBili  x      /  AST  20     /  ALT  14     /  AlkPhos  170    31 Dec 2019 05:54    PT/INR - ( 31 Dec 2019 09:17 )   PT: 19.4 sec;   INR: 1.66 ratio         PTT - ( 31 Dec 2019 09:17 )  PTT:36.6 sec      Blood Culture:   01-01 @ 07:08  Pro Bnp [id number]

## 2020-01-01 NOTE — PROGRESS NOTE ADULT - SUBJECTIVE AND OBJECTIVE BOX
Patient is a 89y old  Male who presents with a chief complaint of Wheezing and SOB x 4 days (01 Jan 2020 08:08)      SUBJECTIVE / OVERNIGHT EVENTS: no overnight events. Still complaining of wheezing, sob mildly improved since admission. Denies chest pain, palpitations, n/v, f/c.     Tele reviewed: afib 80-110s      ADDITIONAL REVIEW OF SYSTEMS: Negative except for above    MEDICATIONS  (STANDING):  atorvastatin 10 milliGRAM(s) Oral at bedtime  digoxin     Tablet 0.125 milliGRAM(s) Oral every other day  furosemide   Injectable 20 milliGRAM(s) IV Push every 12 hours  levalbuterol Inhalation 0.63 milliGRAM(s) Inhalation every 4 hours  losartan 100 milliGRAM(s) Oral daily  metoprolol tartrate 50 milliGRAM(s) Oral two times a day  tamsulosin 0.8 milliGRAM(s) Oral at bedtime    MEDICATIONS  (PRN):  acetaminophen   Tablet .. 650 milliGRAM(s) Oral every 4 hours PRN Mild Pain (1 - 3)  benzonatate 100 milliGRAM(s) Oral three times a day PRN Cough  guaiFENesin   Syrup  (Sugar-Free) 200 milliGRAM(s) Oral every 6 hours PRN Cough      CAPILLARY BLOOD GLUCOSE        I&O's Summary    31 Dec 2019 07:01  -  01 Jan 2020 07:00  --------------------------------------------------------  IN: 1080 mL / OUT: 650 mL / NET: 430 mL    01 Jan 2020 07:01  -  01 Jan 2020 15:51  --------------------------------------------------------  IN: 618 mL / OUT: 1100 mL / NET: -482 mL        PHYSICAL EXAM:  Vital Signs Last 24 Hrs  T(C): 36.6 (01 Jan 2020 11:42), Max: 37.7 (01 Jan 2020 03:37)  T(F): 97.9 (01 Jan 2020 11:42), Max: 99.8 (01 Jan 2020 03:37)  HR: 85 (01 Jan 2020 11:42) (64 - 127)  BP: 121/69 (01 Jan 2020 11:42) (121/69 - 150/80)  BP(mean): --  RR: 18 (01 Jan 2020 11:42) (16 - 18)  SpO2: 97% (01 Jan 2020 11:42) (89% - 97%)    PHYSICAL EXAM:  GENERAL: NAD, well-developed on RA  HEAD:  Atraumatic, Normocephalic  NECK: Supple, No JVD  CHEST/LUNG: +mild diffuse wheezing b/l   HEART: IRRegular rhythm; +systolic murmur  ABDOMEN: Soft, Nontender, Nondistended; Bowel sounds present  EXTREMITIES:  2+ Peripheral Pulses, trace edema b/l   PSYCH: AAOx3  NEUROLOGY: non-focal        LABS:                        9.9    6.15  )-----------( 140      ( 31 Dec 2019 08:44 )             31.3     12-31    140  |  102  |  20  ----------------------------<  170<H>  3.8   |  24  |  0.93    Ca    9.4      31 Dec 2019 05:54    TPro  6.2  /  Alb  3.8  /  TBili  0.5  /  DBili  x   /  AST  20  /  ALT  14  /  AlkPhos  170<H>  12-31    PT/INR - ( 01 Jan 2020 11:06 )   PT: 23.8 sec;   INR: 2.04 ratio         PTT - ( 31 Dec 2019 09:17 )  PTT:36.6 sec            RADIOLOGY & ADDITIONAL TESTS:    Imaging Personally Reviewed:    Electrocardiogram Personally Reviewed:    COORDINATION OF CARE:  Care Discussed with Consultants/Other Providers [Y/N]:  Prior or Outpatient Records Reviewed [Y/N]:

## 2020-01-01 NOTE — PROGRESS NOTE ADULT - PROBLEM SELECTOR PLAN 3
-rate controlled, c/w lopressor 50mg bid, digoxin 0.125mg daily  - INR 2.0, dose coumadin 4mg tonight, monitor INR

## 2020-01-01 NOTE — PROGRESS NOTE ADULT - ASSESSMENT
Mr. Johnson is a 89 year old man with a history of CAD s/p PCI to the mid LAD with a BMS in 1994, double vessel CABG and mitral valve repair February 8th, 2013, paroxysmal atrial fibrillation/flutter with TBS s/p [employer] dual chamber PPM, on Coumadin for stroke risk reduction, hypertension and hyperlipidemia, mild LV dysfunction.  His most recent echo in our office demonstrated moderate to severe MR and moderate to severe TR with moderate pulmonary hypertension as of 2/2019. His EF was around 50%.    - he is demonstrating wheezing and shortness of breath, and there seems to be a viral component to this  - he has mild overload on exam, with small shirley effusions on cxr. He does have severe mr/tr and elevated pulmonary pressures. I would continue to try and maintain a slight negative balance with IV Lasix.    - af rates are mostly controlled.  Continue with metoprolol and digoxin. continue coumadin for goal inr 2-3. He has been on and off a/c and his inr is subtherapeutic, so the possibility of vte does exist.  - bp is at goal. continue with losartan  - no sign of acute ischemia. his hs troponins are negative.   - oxygen supplementation as necessary  - watch creatinine and electrolytes. Keep K>4, Mg>2  - will follow with you.

## 2020-01-02 NOTE — PROGRESS NOTE ADULT - SUBJECTIVE AND OBJECTIVE BOX
Patient is a 89y old  Male who presents with a chief complaint of Wheezing and SOB x 4 days (02 Jan 2020 07:52)      SUBJECTIVE / OVERNIGHT EVENTS: No overnight events. Reports that his wheezing and sob are improved but not back to baseline. Denies cp, palpitations, f/c, n/v.     Tele reviewed: Afib , 8 beats asymptomatic WCT      ADDITIONAL REVIEW OF SYSTEMS: Negative except for above    MEDICATIONS  (STANDING):  atorvastatin 10 milliGRAM(s) Oral at bedtime  digoxin     Tablet 0.125 milliGRAM(s) Oral every other day  furosemide   Injectable 20 milliGRAM(s) IV Push every 12 hours  guaiFENesin  milliGRAM(s) Oral every 12 hours  levalbuterol Inhalation 0.63 milliGRAM(s) Inhalation every 4 hours  losartan 100 milliGRAM(s) Oral daily  metoprolol tartrate 50 milliGRAM(s) Oral two times a day  predniSONE   Tablet 20 milliGRAM(s) Oral daily  tamsulosin 0.8 milliGRAM(s) Oral at bedtime  warfarin 3 milliGRAM(s) Oral once    MEDICATIONS  (PRN):  acetaminophen   Tablet .. 650 milliGRAM(s) Oral every 4 hours PRN Mild Pain (1 - 3)  benzonatate 100 milliGRAM(s) Oral three times a day PRN Cough      CAPILLARY BLOOD GLUCOSE        I&O's Summary    01 Jan 2020 07:01  -  02 Jan 2020 07:00  --------------------------------------------------------  IN: 1456 mL / OUT: 2850 mL / NET: -1394 mL    02 Jan 2020 07:01  -  02 Jan 2020 13:00  --------------------------------------------------------  IN: 500 mL / OUT: 300 mL / NET: 200 mL        PHYSICAL EXAM:  Vital Signs Last 24 Hrs  T(C): 36.8 (02 Jan 2020 10:55), Max: 36.8 (02 Jan 2020 10:55)  T(F): 98.3 (02 Jan 2020 10:55), Max: 98.3 (02 Jan 2020 10:55)  HR: 79 (02 Jan 2020 10:55) (79 - 93)  BP: 124/70 (02 Jan 2020 10:55) (124/70 - 151/97)  BP(mean): 100 (01 Jan 2020 17:09) (100 - 100)  RR: 18 (02 Jan 2020 10:55) (18 - 18)  SpO2: 94% (02 Jan 2020 10:55) (94% - 96%)      PHYSICAL EXAM:  GENERAL: NAD, well-developed on RA in chair  HEAD:  Atraumatic, Normocephalic  NECK: Supple, No JVD  CHEST/LUNG: scattered rhonci b/l, wheezing resolved  HEART: IRRegular rhythm; +systolic murmur  ABDOMEN: Soft, Nontender, Nondistended; Bowel sounds present  EXTREMITIES:  2+ Peripheral Pulses, trace edema b/l   PSYCH: AAOx3  NEUROLOGY: non-focal    LABS:                        10.2   4.46  )-----------( 133      ( 02 Jan 2020 08:51 )             31.5     01-02    137  |  102  |  19  ----------------------------<  160<H>  3.6   |  23  |  0.80    Ca    9.3      02 Jan 2020 06:31      PT/INR - ( 02 Jan 2020 08:22 )   PT: 29.6 sec;   INR: 2.52 ratio                     RADIOLOGY & ADDITIONAL TESTS:    Imaging Personally Reviewed:    Electrocardiogram Personally Reviewed:    COORDINATION OF CARE:  Care Discussed with Consultants/Other Providers [Y/N]:  Prior or Outpatient Records Reviewed [Y/N]:

## 2020-01-02 NOTE — PROGRESS NOTE ADULT - PROBLEM SELECTOR PLAN 3
-rate controlled, c/w lopressor 50mg bid, digoxin 0.125mg every other day  - INR 2.5, dose coumadin 3mg tonight, monitor INR

## 2020-01-02 NOTE — PROGRESS NOTE ADULT - SUBJECTIVE AND OBJECTIVE BOX
[facility] - Preet Glover, Tere, Andreea, Anh, Juan Carlos Garza  Office Number:  [phone]    Patient resting comfortably in bed in NAD.  Laying flat with no respiratory distress.   says he still has an occasional wheeze though breathing has improved slightly.    ROS: negative unless otherwise mentioned.    Telemetry:  af    MEDICATIONS  (STANDING):  atorvastatin 10 milliGRAM(s) Oral at bedtime  digoxin     Tablet 0.125 milliGRAM(s) Oral every other day  furosemide   Injectable 20 milliGRAM(s) IV Push every 12 hours  guaiFENesin  milliGRAM(s) Oral every 12 hours  levalbuterol Inhalation 0.63 milliGRAM(s) Inhalation every 4 hours  losartan 100 milliGRAM(s) Oral daily  metoprolol tartrate 50 milliGRAM(s) Oral two times a day  predniSONE   Tablet 20 milliGRAM(s) Oral daily  tamsulosin 0.8 milliGRAM(s) Oral at bedtime    MEDICATIONS  (PRN):  acetaminophen   Tablet .. 650 milliGRAM(s) Oral every 4 hours PRN Mild Pain (1 - 3)  benzonatate 100 milliGRAM(s) Oral three times a day PRN Cough      Allergies    No Known Allergies    Intolerances        Vital Signs Last 24 Hrs  T(C): 36.6 (02 Jan 2020 04:13), Max: 36.6 (01 Jan 2020 11:42)  T(F): 97.8 (02 Jan 2020 04:13), Max: 97.9 (01 Jan 2020 11:42)  HR: 93 (02 Jan 2020 04:13) (79 - 93)  BP: 150/76 (02 Jan 2020 04:13) (121/69 - 151/97)  BP(mean): 100 (01 Jan 2020 17:09) (100 - 100)  RR: 18 (02 Jan 2020 04:13) (18 - 18)  SpO2: 96% (02 Jan 2020 04:13) (94% - 97%)    I&O's Summary    01 Jan 2020 07:01  -  02 Jan 2020 07:00  --------------------------------------------------------  IN: 1456 mL / OUT: 2850 mL / NET: -1394 mL        ON EXAM:    Constitutional: NAD, alert and oriented x 3  Lungs:  Non-labored, faint exp wheeze and cough on breaths, No rales, though faint rhonchi.  Cardiovascular: RRR.  S1 and S2 positive.  No murmurs, rubs, gallops or clicks  Gastrointestinal: Bowel Sounds present, soft, nontender.   Lymph: right lower extremity peripheral edema. No cervical lymphadenopathy.  Neurological: Alert, no focal deficits  Skin: No rashes or ulcers   Psych:  Mood & affect appropriate.    LABS: All Labs Reviewed:                        9.9    6.15  )-----------( 140      ( 31 Dec 2019 08:44 )             31.3                         11.2   7.75  )-----------( 131      ( 30 Dec 2019 18:57 )             36.1     02 Jan 2020 06:31    137    |  102    |  19     ----------------------------<  160    3.6     |  23     |  0.80   31 Dec 2019 05:54    140    |  102    |  20     ----------------------------<  170    3.8     |  24     |  0.93   30 Dec 2019 18:57    140    |  101    |  18     ----------------------------<  127    4.2     |  24     |  0.92     Ca    9.3        02 Jan 2020 06:31  Ca    9.4        31 Dec 2019 05:54  Ca    9.8        30 Dec 2019 18:57    TPro  6.2    /  Alb  3.8    /  TBili  0.5    /  DBili  x      /  AST  20     /  ALT  14     /  AlkPhos  170    31 Dec 2019 05:54    PT/INR - ( 01 Jan 2020 11:06 )   PT: 23.8 sec;   INR: 2.04 ratio         PTT - ( 31 Dec 2019 09:17 )  PTT:36.6 sec      Blood Culture:   01-02 @ 06:31  Pro Bnp [id number]  01-01 @ 07:08  Pro Bnp [id number]

## 2020-01-02 NOTE — PROGRESS NOTE ADULT - PROBLEM SELECTOR PLAN 1
Multifactorial, likely viral URI/bronchitis/RAD/given wheezing and cough and decompensated HF with small b/l pleural effusions  -symptoms improving, wheezing resolved  - 02 sat stable on RA  - low suspicion for PE, on coumadin now therapeutic  - not PNA, CXR w/o infiltrate and negative procalcitonin, afebrile  - trops flat, no acute ischemic EKG changes, cards following  - s/p IV solumedrol 40mg, c/w prednisone 20mg x 4 days given above  -c/w Mucinex 600mg bid, c/w tessalon jennifer prn  -decrease Levalbuterol to q8 hours given WCT  - diuresis as below  - monitor on telemetry

## 2020-01-02 NOTE — PROGRESS NOTE ADULT - ASSESSMENT
Mr. Johnson is a 89 year old man with a history of CAD s/p PCI to the mid LAD with a BMS in 1994, double vessel CABG and mitral valve repair February 8th, 2013, paroxysmal atrial fibrillation/flutter with TBS s/p [place] dual chamber PPM, on Coumadin for stroke risk reduction, hypertension and hyperlipidemia, mild LV dysfunction.  His most recent echo in our office demonstrated moderate to severe MR and moderate to severe TR with moderate pulmonary hypertension as of 2/2019. His EF was around 50%.    - he is demonstrating wheezing and shortness of breath, and there seems to be a viral component to this  - he has mild overload on exam, with small shirley effusions on cxr. He does have severe mr/tr and elevated pulmonary pressures. His BNP is elevated. I would continue to try and maintain a slight negative balance with IV Lasix.  He is -1.3 L over last 24 hours. I would continue iv lasix today, with plan to switch to po tomorrow.  - af rates are mostly controlled.  Continue with metoprolol and digoxin. continue coumadin for goal inr 2-3. He has been on and off a/c and his inr is subtherapeutic, so the possibility of vte does exist.  - bp is at goal. continue with losartan  - no sign of acute ischemia. his hs troponins are negative.   - oxygen supplementation as necessary  - watch creatinine and electrolytes. Keep K>4, Mg>2  - will follow with you.

## 2020-01-03 NOTE — PROVIDER CONTACT NOTE (OTHER) - ASSESSMENT
BP: 146/82  HR: 106  O2: 98% on 2L NC  Pt denies chest pain and any distress. First dose of metoprolol 100mg just given.

## 2020-01-03 NOTE — PROGRESS NOTE ADULT - SUBJECTIVE AND OBJECTIVE BOX
[facility] - Preet Glover, Tere, Andreea, Anh, Juan Carlos Garza  Office Number:  [phone]    Patient resting comfortably in bed in NAD.   overnight with an episodes of shortness of breath and wheezing. He also had a coughing fit and af with rvr.  He continues to wheeze.     ROS: negative unless otherwise mentioned.    Telemetry:  af , with increase 130    MEDICATIONS  (STANDING):  atorvastatin 10 milliGRAM(s) Oral at bedtime  digoxin     Tablet 0.125 milliGRAM(s) Oral every other day  furosemide   Injectable 20 milliGRAM(s) IV Push every 12 hours  guaiFENesin  milliGRAM(s) Oral every 12 hours  levalbuterol Inhalation 0.63 milliGRAM(s) Inhalation every 8 hours  losartan 100 milliGRAM(s) Oral daily  metoprolol tartrate 50 milliGRAM(s) Oral two times a day  predniSONE   Tablet 20 milliGRAM(s) Oral daily  tamsulosin 0.8 milliGRAM(s) Oral at bedtime    MEDICATIONS  (PRN):  acetaminophen   Tablet .. 650 milliGRAM(s) Oral every 4 hours PRN Mild Pain (1 - 3)  benzonatate 100 milliGRAM(s) Oral three times a day PRN Cough      Allergies    No Known Allergies    Intolerances        Vital Signs Last 24 Hrs  T(C): 36.6 (03 Jan 2020 04:06), Max: 36.8 (02 Jan 2020 10:55)  T(F): 97.8 (03 Jan 2020 04:06), Max: 98.3 (02 Jan 2020 10:55)  HR: 134 (03 Jan 2020 04:07) (79 - 134)  BP: 117/87 (03 Jan 2020 04:07) (117/87 - 164/100)  BP(mean): --  RR: 28 (03 Jan 2020 04:06) (18 - 28)  SpO2: 97% (03 Jan 2020 04:06) (91% - 98%)    I&O's Summary    02 Jan 2020 07:01  -  03 Jan 2020 07:00  --------------------------------------------------------  IN: 840 mL / OUT: 1310 mL / NET: -470 mL    03 Jan 2020 07:01  -  03 Jan 2020 10:21  --------------------------------------------------------  IN: 240 mL / OUT: 550 mL / NET: -310 mL        ON EXAM:    Constitutional: NAD, alert and oriented x 3  Lungs:  Non-labored, faint exp wheeze and cough on breaths, No rales, though faint rhonchi.  Cardiovascular: irregular,  S1 and S2 positive.  +sm at llsb, no rubs, gallops or clicks  Gastrointestinal: Bowel Sounds present, soft, nontender.   Lymph: right lower extremity peripheral edema. No cervical lymphadenopathy.  Neurological: Alert, no focal deficits  Skin: No rashes or ulcers   Psych:  Mood & affect appropriate.    LABS: All Labs Reviewed:                        10.8   10.80 )-----------( 150      ( 03 Jan 2020 08:32 )             33.4                         10.2   4.46  )-----------( 133      ( 02 Jan 2020 08:51 )             31.5     03 Jan 2020 06:34    133    |  98     |  22     ----------------------------<  133    3.6     |  24     |  0.87   02 Jan 2020 06:31    137    |  102    |  19     ----------------------------<  160    3.6     |  23     |  0.80     Ca    9.4        03 Jan 2020 06:34  Ca    9.3        02 Jan 2020 06:31  Phos  2.7       03 Jan 2020 06:34  Mg     1.9       03 Jan 2020 06:34      PT/INR - ( 03 Jan 2020 08:30 )   PT: 36.3 sec;   INR: 3.05 ratio               Blood Culture:   01-02 @ 06:31  Pro Bnp [id number]  01-01 @ 07:08  Pro Bnp [id number]

## 2020-01-03 NOTE — PHYSICAL THERAPY INITIAL EVALUATION ADULT - PERTINENT HX OF CURRENT PROBLEM, REHAB EVAL
89 yo M p/w 4 days of worsening wheezing. Intermittent dyspnea but no decrease in exercise tolerance. XRay Chest 12/30: Small bilateral pleural effusions, right greater than left.

## 2020-01-03 NOTE — CHART NOTE - NSCHARTNOTEFT_GEN_A_CORE
Chart/Event Note  [facility] [address]  SONIA JACQUES, 89y, Male  [id number]    Reason for Notification:   Notified by RN that the above patient was having respiratory distress.     Events/History of Present Illness  Went to bedside with nursing staff to evaluate patient. Patient awake and alert, states he feels ok, but does report some increased respiratory distress. Patient states that he   .chart    Review of Systems:  Constitutional: No fever, chills, or fatigue.  Neurologic: No headache, dizziness, vision/speech changes, numbness, or weakness.  Respiratory: No cough, wheezing, dyspnea, or shortness of breath.  Cardiovascular: No chest pain, pressure, or palpitations.   GI: No abdominal pain, nausea, vomiting, diarrhea, constipation.   : No dysuria, burning, frequency, incontinence, or retention.   Skin: No itching, burning, rashes, or lesions .  Musculoskeletal: No joint pain or swelling.   Psychiatric: No depression, anxiety, or mood swings.    T(C): 36.6 (01-03-20 @ 04:06), Max: 36.8 (01-02-20 @ 10:55)  HR: 134 (01-03-20 @ 04:07) (79 - 134)  BP: 117/87 (01-03-20 @ 04:07) (117/87 - 164/100)  RR: 28 (01-03-20 @ 04:06) (18 - 28)  SpO2: 97% (01-03-20 @ 04:06) (91% - 98%)             Physical Examination:  GENERAL: No acute distress noted during examination. Patient is well-groomed and developed.  NERVOUS SYSTEM:  Alert & oriented X3 with appropriate concentration. Motor strength is 5/5 in both bilateral upper and lower extremities. No focal or lateralizing neurologic deficits noted.   HEAD:  Atraumatic and normocephalic.  EYES: EOMI/PERRLA. Conjunctiva and sclera clear  ENMT: No tonsillar erythema, exudates, lesions, or enlargement. Moist mucous membranes with good dentition.   NECK: Supple with no JVD.   CHEST: Clear to ascultation bilaterally. No rales, rhonchi, wheezing, or rubs heard. Symmetrical/bilateral chest wall rise.   HEART: Regular rate and rhythm with no murmurs, rubs, or gallops.  ABDOMEN: Soft, nontender, and nondistended.   EXTREMITIES:  2+ Peripheral Pulses without clubbing, cyanosis, or edema.  LYMPH: No lymphadenopathy noted.  SKIN: No rashes or lesions seen.     Medical Decision Making/Assessment/Plan:  89y-year-old Male with a past medical history of ______ , admitted for _____ , now with _____ .     - Diagnosis:      1)   2)   3)   4) Will endorse the above diagnostics and findings to the day medicine team for review and follow up. Will additionally sign out to day medicine teams to follow with relevant specialties.     Kwabena Urias NP  Department of Medicine   # Chart/Event Note  [facility] [address]  SONIA JACQUES, 89y, Male  [id number]    Reason for Notification:   Notified by RN that the above patient was having respiratory distress.     Events/History of Present Illness  RN was notified by telemetry that patient heart rate, which was previously AFib, increased to AFib with RVR to a rate of 160s. Went to bedside with nursing staff to evaluate patient. Patient awake and alert, states he feels ok, but does report some increased shortness of breath. Heart rate had decreased to 130s (AFib) during assessment. Patient reports having a coughing fit prior to episode of tachycardia. Patient is conversive with short, choppy sentences. Occasional non-productive cough noted, patient reporting sensation of "junky-ness" to his upper throat. States this episode of shortness of breath started 10 minutes prior to assessment. Rates current symptoms as mild. States he commonly achieves relief with nebulizer treatments. Denies chest pain or palpitations.    Patient's chart reviewed. Has history of CHF and COPD, currently receiving IV steroids and diuresis.      Review of Systems:  Constitutional: No fever, chills, or fatigue.  Neurologic: No headache, dizziness, vision/speech changes, numbness, or weakness.  Respiratory: Moderate shortness of breath. Wheezing. Frequent non-productive cough.   Cardiovascular: No chest pain, pressure, or palpitations.   GI: No abdominal pain, nausea, vomiting, diarrhea, constipation.   Musculoskeletal: No joint pain or swelling.     T(C): 36.6 (01-03-20 @ 04:06), Max: 36.8 (01-02-20 @ 10:55)  HR: 134 (01-03-20 @ 04:07) (79 - 134)  BP: 117/87 (01-03-20 @ 04:07) (117/87 - 164/100)  RR: 28 (01-03-20 @ 04:06) (18 - 28)  SpO2: 97% (01-03-20 @ 04:06) (91% - 98%)             Physical Examination:  GENERAL: Moderate respiratory distress noted.   NERVOUS SYSTEM:  Alert & oriented X3 with appropriate concentration.   HEAD:  Atraumatic and normocephalic.  EYES: EOMI/PERRLA. Conjunctiva and sclera clear  NECK: Supple with no JVD.   CHEST: Diminished across all fields with decreased air movement. Scattered wheezes. Scattered crackles. Symmetrical/bilateral chest wall rise. Abdominal muscle usage. SPO2 97% on NC.   HEART: Tachycardic rate and irregular rhythm.  ABDOMEN: Soft, nontender, and nondistended.   EXTREMITIES:  2+ Peripheral Pulses with +2 bilateral lower extremity edema.     Medical Decision Making/Assessment/Plan:  89y-year-old Male with a past medical history of AFib, coronary artery disease, COPD, CHF , admitted for respiratory distress secondary to COPD and CHF exacerbation, now with another episode of shortness of breath.     - Diagnosis: COPD +/- CHF Exacerbation, acute on chronic.   Patient with known CHF and COPD currently on PO Prednisone and IV Lasix, now having diminished breath sounds, decreased air movement, worsening cough, and tachycardia related to respiratory distress. Will treat with additional nebulizers, IV steroids, and IV Lasix, the re-evaluate.   1) Xopenex and Atrovent nebulizers x2.   2) IV Solu-medrol bolus x1. Will discontinue this mornings PO Prednisone dose since he received IV steroids.   3) Extra 1x dose of 20 mg IV Lasix in addition to patient's daily 20 mg IV Lasix BID.   4) Should patient have continued respiratory distress will trial BIPAP and escalate to rapid response team.   5) Labs and repeat Chest X-ray this morning. Day medicine team to follow with official read and lab results.   6) Reviewed above with hospitalist attending physician Dr. Verduzco.   7) Will endorse the above diagnostics and findings to the day medicine team for review and follow up. Will additionally sign out to day medicine teams to follow with relevant specialties.     Kwabnea Urias NP  Department of Medicine   #[id number] Chart/Event Note  [facility] [address]  SONIA JACQUES, 89y, Male  [id number]    Reason for Notification:   Notified by RN that the above patient was having respiratory distress.     Events/History of Present Illness  RN was notified by telemetry that patient heart rate, which was previously AFib, increased to AFib with RVR to a rate of 160s. Went to bedside with nursing staff to evaluate patient. Patient awake and alert, states he feels ok, but does report some increased shortness of breath. Heart rate had decreased to 130s (AFib) during assessment. Patient reports having a coughing fit prior to episode of tachycardia. Patient is conversive with short, choppy sentences. Occasional non-productive cough noted, patient reporting sensation of "junky-ness" to his upper throat. States this episode of shortness of breath started 10 minutes prior to assessment. Rates current symptoms as mild. States he commonly achieves relief with nebulizer treatments. Denies chest pain or palpitations. Afebrile.   Patient's chart reviewed. Has history of CHF and COPD, currently receiving steroids and diuresis.      Review of Systems:  Constitutional: No fever, chills, or fatigue.  Neurologic: No headache, dizziness, vision/speech changes, numbness, or weakness.  Respiratory: Moderate shortness of breath. Wheezing. Frequent non-productive cough.   Cardiovascular: No chest pain, pressure, or palpitations.   GI: No abdominal pain, nausea, vomiting, diarrhea, constipation.   Musculoskeletal: No joint pain or swelling.     T(C): 36.6 (01-03-20 @ 04:06), Max: 36.8 (01-02-20 @ 10:55)  HR: 134 (01-03-20 @ 04:07) (79 - 134)  BP: 117/87 (01-03-20 @ 04:07) (117/87 - 164/100)  RR: 28 (01-03-20 @ 04:06) (18 - 28)  SpO2: 97% (01-03-20 @ 04:06) (91% - 98%)             Physical Examination:  GENERAL: Moderate respiratory distress noted.   NERVOUS SYSTEM:  Alert & oriented X3 with appropriate concentration.   HEAD:  Atraumatic and normocephalic.  EYES: EOMI/PERRLA. Conjunctiva and sclera clear  NECK: Supple with no JVD.   CHEST: Diminished across all fields with decreased air movement. Scattered wheezes. Scattered crackles. Symmetrical/bilateral chest wall rise. Abdominal muscle usage. SPO2 97% on NC.   HEART: Tachycardic rate and irregular rhythm.  ABDOMEN: Soft, nontender, and nondistended.   EXTREMITIES:  2+ Peripheral Pulses with +2 bilateral lower extremity edema.     Medical Decision Making/Assessment/Plan:  89y-year-old Male with a past medical history of AFib, coronary artery disease, COPD, CHF , admitted for respiratory distress secondary to COPD and CHF exacerbation, now with another episode of shortness of breath.     - Diagnosis: COPD +/- CHF Exacerbation, acute on chronic.   Patient with known CHF and COPD currently on PO Prednisone and IV Lasix, now having diminished breath sounds, decreased air movement, worsening cough, and tachycardia related to respiratory distress. Will treat with additional nebulizers, IV steroids, and IV Lasix, the re-evaluate.   1) Xopenex and Atrovent nebulizers x2.   2) IV Solu-medrol bolus x1. Will discontinue this mornings PO Prednisone dose since he received IV steroids.   3) Extra 1x dose of 20 mg IV Lasix in addition to patient's daily 20 mg IV Lasix BID.   4) Should patient have continued respiratory distress will trial BIPAP and escalate to rapid response team.   5) Labs and repeat Chest X-ray this morning. Day medicine team to follow with official read and lab results.   6) Reviewed above with hospitalist attending physician Dr. Verduzco.   7) Will endorse the above diagnostics and findings to the day medicine team for review and follow up. Will additionally sign out to day medicine teams to follow with relevant specialties.     Kwabena Urias NP  Department of Medicine   #[id number] Chart/Event Note  [facility] [address]  SONIA JACQUES, 89y, Male  [id number]    Reason for Notification:   Notified by RN that the above patient was having respiratory distress.     Events/History of Present Illness  RN was notified by telemetry that patient heart rate, which was previously AFib, increased to AFib with RVR to a rate of 160s. Went to bedside with nursing staff to evaluate patient. Patient awake and alert, states he feels ok, but does report some increased shortness of breath. Heart rate had decreased to 130s (AFib) during assessment. Patient reports having a coughing fit prior to episode of tachycardia. Patient is conversive with short, choppy sentences. Occasional non-productive cough noted, patient reporting sensation of "junky-ness" to his upper throat. States this episode of shortness of breath started 10 minutes prior to assessment. Rates current symptoms as mild. States he commonly achieves relief with nebulizer treatments. Denies chest pain or palpitations. Afebrile.   Patient's chart reviewed. Has history of CHF and (now) ?RAD currently receiving steroids and diuresis.      Review of Systems:  Constitutional: No fever, chills, or fatigue.  Neurologic: No headache, dizziness, vision/speech changes, numbness, or weakness.  Respiratory: Moderate shortness of breath. Wheezing. Frequent non-productive cough.   Cardiovascular: No chest pain, pressure, or palpitations.   GI: No abdominal pain, nausea, vomiting, diarrhea, constipation.   Musculoskeletal: No joint pain or swelling.     T(C): 36.6 (01-03-20 @ 04:06), Max: 36.8 (01-02-20 @ 10:55)  HR: 134 (01-03-20 @ 04:07) (79 - 134)  BP: 117/87 (01-03-20 @ 04:07) (117/87 - 164/100)  RR: 28 (01-03-20 @ 04:06) (18 - 28)  SpO2: 97% (01-03-20 @ 04:06) (91% - 98%)             Physical Examination:  GENERAL: Moderate respiratory distress noted.   NERVOUS SYSTEM:  Alert & oriented X3 with appropriate concentration.   HEAD:  Atraumatic and normocephalic.  EYES: EOMI/PERRLA. Conjunctiva and sclera clear  NECK: Supple with no JVD.   CHEST: Diminished across all fields with decreased air movement. Scattered wheezes. Scattered crackles. Symmetrical/bilateral chest wall rise. Abdominal muscle usage. SPO2 97% on NC.   HEART: Tachycardic rate and irregular rhythm.  ABDOMEN: Soft, nontender, and nondistended.   EXTREMITIES:  2+ Peripheral Pulses with +2 bilateral lower extremity edema.     Medical Decision Making/Assessment/Plan:  89y-year-old Male with a past medical history of AFib, coronary artery disease, CHF , admitted for respiratory distress secondary to ?RAD and CHF exacerbation, now with another episode of shortness of breath.     - Diagnosis: ?RAD+/- CHF Exacerbation, acute on chronic.   Patient with known CHF and RAD currently on PO Prednisone and IV Lasix, now having diminished breath sounds, decreased air movement, worsening cough, and tachycardia related to respiratory distress. Will treat with additional nebulizers, IV steroids, and IV Lasix, the re-evaluate.   1) Xopenex and Atrovent nebulizers x2.   2) IV Solu-medrol bolus and Magnesium x1 due to severity of wheezing and respiratory distress. Will discontinue this mornings PO Prednisone dose since he received IV steroids.   3) Extra 1x dose of 20 mg IV Lasix in addition to patient's daily 20 mg IV Lasix BID.   4) Should patient have continued respiratory distress will trial BIPAP and escalate to rapid response team.   5) Labs and repeat Chest X-ray this morning. Day medicine team to follow with official read and lab results.   6) Reviewed above with hospitalist attending physician Dr. Verduzco.   7) Will endorse the above diagnostics and findings to the day medicine team for review and follow up. Will additionally sign out to day medicine teams to follow with relevant specialties.     Kwabena Urias NP  Department of Medicine   #[id number]

## 2020-01-03 NOTE — PROGRESS NOTE ADULT - PROBLEM SELECTOR PLAN 1
Multifactorial, likely viral URI/bronchitis/RAD/given wheezing and cough and combined with decompensated HF with small b/l pleural effusions  -had worsening sob/wheezing/coughing overnight  -increase levalbuterol to q6 ATC, s/p IV Solumedrol 125mg, will c/w prednisone 40mg starting 1/4  -increase diuresis as below, CT chest to further assess per cards  - 02 sat stable on RA  - low suspicion for PE, on coumadin now therapeutic  - not PNA, CXR w/o infiltrate and negative procalcitonin, afebrile. leukocytosis due to steroids  - trops flat, no acute ischemic EKG changes, cards following  -c/w Mucinex 600mg bid, c/w tessalon jennifer prn  - diuresis as below  - monitor on telemetry

## 2020-01-03 NOTE — PROVIDER CONTACT NOTE (OTHER) - BACKGROUND
Pt admitted for SOB and wheezing.   PMH: Afib, Cough, CAD, Seizure, LD, Valvular heart disease, Phimosis, HTN, HLD, GERD, BPH, Cataract, Glaucoma, Bronchitis

## 2020-01-03 NOTE — PROGRESS NOTE ADULT - SUBJECTIVE AND OBJECTIVE BOX
Patient is a 89y old  Male who presents with a chief complaint of Wheezing and SOB x 4 days (03 Jan 2020 10:20)      SUBJECTIVE / OVERNIGHT EVENTS: Overnight had sob, wheezing, coughing fit, Afib with RVR and was given dose of IV solumedrol 125mg, IV Mg, and IV lasix 40mg. This AM reports improvement in sob and wheezing compared to last night but not resolved. Maggie cardona, n/v, f/c.     Tele reviewed: Afib , pvcs      ADDITIONAL REVIEW OF SYSTEMS: Negative except for above    MEDICATIONS  (STANDING):  atorvastatin 10 milliGRAM(s) Oral at bedtime  digoxin     Tablet 0.125 milliGRAM(s) Oral every other day  furosemide   Injectable 40 milliGRAM(s) IV Push every 12 hours  guaiFENesin  milliGRAM(s) Oral every 12 hours  levalbuterol Inhalation 0.63 milliGRAM(s) Inhalation every 6 hours  losartan 100 milliGRAM(s) Oral daily  metoprolol tartrate 100 milliGRAM(s) Oral two times a day  tamsulosin 0.8 milliGRAM(s) Oral at bedtime    MEDICATIONS  (PRN):  acetaminophen   Tablet .. 650 milliGRAM(s) Oral every 4 hours PRN Mild Pain (1 - 3)  benzonatate 100 milliGRAM(s) Oral three times a day PRN Cough      CAPILLARY BLOOD GLUCOSE        I&O's Summary    02 Jan 2020 07:01  -  03 Jan 2020 07:00  --------------------------------------------------------  IN: 840 mL / OUT: 1310 mL / NET: -470 mL    03 Jan 2020 07:01  -  03 Jan 2020 11:08  --------------------------------------------------------  IN: 240 mL / OUT: 550 mL / NET: -310 mL        PHYSICAL EXAM:  Vital Signs Last 24 Hrs  T(C): 36.6 (03 Jan 2020 04:06), Max: 36.6 (02 Jan 2020 20:01)  T(F): 97.8 (03 Jan 2020 04:06), Max: 97.9 (02 Jan 2020 20:01)  HR: 100 (03 Jan 2020 10:36) (92 - 134)  BP: 147/70 (03 Jan 2020 10:36) (117/87 - 164/100)  BP(mean): --  RR: 28 (03 Jan 2020 04:06) (20 - 28)  SpO2: 94% (03 Jan 2020 10:36) (91% - 98%)    PHYSICAL EXAM:    PHYSICAL EXAM:  GENERAL: NAD, well-developed on RA in chair on RA satting 94% with coughing  HEAD:  Atraumatic, Normocephalic  NECK: Supple, No JVD  CHEST/LUNG: + b/l faint wheezing with scattered rhonci b/l   HEART: IRRegular rhythm; +systolic murmur  ABDOMEN: Soft, Nontender, Nondistended; Bowel sounds present  EXTREMITIES:  2+ Peripheral Pulses, 1+ pitting edema not improved since yest  PSYCH: AAOx3  NEUROLOGY: non-focal        LABS:                        10.8   10.80 )-----------( 150      ( 03 Jan 2020 08:32 )             33.4     01-03    133<L>  |  98  |  22  ----------------------------<  133<H>  3.6   |  24  |  0.87    Ca    9.4      03 Jan 2020 06:34  Phos  2.7     01-03  Mg     1.9     01-03      PT/INR - ( 03 Jan 2020 08:30 )   PT: 36.3 sec;   INR: 3.05 ratio                     RADIOLOGY & ADDITIONAL TESTS:    Imaging Personally Reviewed:    Electrocardiogram Personally Reviewed:    COORDINATION OF CARE:  Care Discussed with Consultants/Other Providers [Y/N]:  Prior or Outpatient Records Reviewed [Y/N]:

## 2020-01-03 NOTE — PROGRESS NOTE ADULT - PROBLEM SELECTOR PLAN 3
afib rvr overnight, likely due to nebs, chf, coughing  -per cards increase lopressor to 100mg bid, digoxin 0.125mg every other day  -INR 3.0 uptrending, will hold coumadin tonight (home dose 3-4mg), monitor inr

## 2020-01-03 NOTE — PHYSICAL THERAPY INITIAL EVALUATION ADULT - GAIT DEVIATIONS NOTED, PT EVAL
decreased stride length/decreased modesto/increased time in double stance/decreased velocity of limb motion/decreased weight-shifting ability

## 2020-01-03 NOTE — PROGRESS NOTE ADULT - ASSESSMENT
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

## 2020-01-03 NOTE — PHYSICAL THERAPY INITIAL EVALUATION ADULT - ADDITIONAL COMMENTS
Pt lives in private home with dtr, 3 steps to enter +HR. Prior to admission, pt was I with all functional mobility and ADLs ? with rolling walker. Pt also owns a shower chair.

## 2020-01-03 NOTE — PROVIDER CONTACT NOTE (OTHER) - ACTION/TREATMENT ORDERED:
NP to bedside to assess patient. IV Solumedrol & additional breathing treatments, Chest Xray & IV Mag ordered and given

## 2020-01-03 NOTE — PROVIDER CONTACT NOTE (OTHER) - ASSESSMENT
pt claimed he "felt fine", visible use of accessory muscles & labored breathing. Saturation 97% on 3L NC. & tachy to 150-160s on tele

## 2020-01-03 NOTE — PHYSICAL THERAPY INITIAL EVALUATION ADULT - PLANNED THERAPY INTERVENTIONS, PT EVAL
bed mobility training/GOAL: Pt will negotiate 10 steps with 1 HR and step to pattern independently in 4 weeks./transfer training/balance training/gait training

## 2020-01-03 NOTE — PROGRESS NOTE ADULT - ASSESSMENT
Mr. Johnson is a 89 year old man with a history of CAD s/p PCI to the mid LAD with a BMS in 1994, double vessel CABG and mitral valve repair February 8th, 2013, paroxysmal atrial fibrillation/flutter with TBS s/p [facility] [facility] dual chamber PPM, on Coumadin for stroke risk reduction, hypertension and hyperlipidemia, mild LV dysfunction.  His most recent echo in our office demonstrated moderate to severe MR and moderate to severe TR with moderate pulmonary hypertension as of 2/2019. His EF was around 50%.    - he is demonstrating wheezing and shortness of breath, and there seems to be a viral component to this  - he has mild overload on exam, with small shirley effusions on cxr. He does have severe mr/tr and elevated pulmonary pressures. His BNP is elevated and has trended up. I would continue to try and maintain a slight negative balance with IV Lasix.  Increase his lasix to 40 iv bid for today, and watch his creatinine trend.  - agree with steroids for reactive airway disease  - consider ct chest non-contract  - replete K to >4, Mg>2    - af rates are mostly controlled, except for a bout last night during a coughing fit. Can increased metoprolol to 100 bid and continue digoxin. continue coumadin for goal inr 2-3.  - continue with losartan  - no sign of acute ischemia. his hs troponins are negative.   - oxygen supplementation as necessary  - watch creatinine and electrolytes. Keep K>4, Mg>2  - will follow with you.

## 2020-01-03 NOTE — PROGRESS NOTE ADULT - PROBLEM SELECTOR PLAN 2
small b/l pleural effusions, LE edema, sob, still overloaded  -discussed with cards Dr Rangel, increase lasix to 40mg bid and get CT chest  -TTE 2/2019 with EF 50%, moderate to severe MR and moderate to severe TR with moderate pulmonary HTN  -monitor strict I/O, daily weight  -monitor cr/lytes, replete prn

## 2020-01-04 NOTE — PROGRESS NOTE ADULT - SUBJECTIVE AND OBJECTIVE BOX
Patient is a 89y old  Male who presents with a chief complaint of Wheezing and SOB x 4 days (04 Jan 2020 08:15)      INTERVAL History of Present Illness/OVERNIGHT EVENTS: continues to be symptomatic    MEDICATIONS  (STANDING):  atorvastatin 10 milliGRAM(s) Oral at bedtime  dextrose 5%. 1000 milliLiter(s) (50 mL/Hr) IV Continuous <Continuous>  dextrose 50% Injectable 12.5 Gram(s) IV Push once  dextrose 50% Injectable 25 Gram(s) IV Push once  dextrose 50% Injectable 25 Gram(s) IV Push once  digoxin     Tablet 0.125 milliGRAM(s) Oral every other day  furosemide   Injectable 40 milliGRAM(s) IV Push every 12 hours  guaiFENesin  milliGRAM(s) Oral every 12 hours  insulin lispro (HumaLOG) corrective regimen sliding scale   SubCutaneous three times a day before meals  levalbuterol Inhalation 0.63 milliGRAM(s) Inhalation every 6 hours  losartan 100 milliGRAM(s) Oral daily  metoprolol tartrate 100 milliGRAM(s) Oral two times a day  predniSONE   Tablet 40 milliGRAM(s) Oral daily  tamsulosin 0.8 milliGRAM(s) Oral at bedtime    MEDICATIONS  (PRN):  acetaminophen   Tablet .. 650 milliGRAM(s) Oral every 4 hours PRN Mild Pain (1 - 3)  benzonatate 100 milliGRAM(s) Oral three times a day PRN Cough  dextrose 40% Gel 15 Gram(s) Oral once PRN Blood Glucose LESS THAN 70 milliGRAM(s)/deciliter  glucagon  Injectable 1 milliGRAM(s) IntraMuscular once PRN Glucose LESS THAN 70 milligrams/deciliter      Allergies    No Known Allergies    Intolerances        REVIEW OF SYSTEMS:  Negative unless otherwise specified above.    Vital Signs Last 24 Hrs  T(C): 36.8 (04 Jan 2020 04:40), Max: 36.8 (04 Jan 2020 04:40)  T(F): 98.2 (04 Jan 2020 04:40), Max: 98.2 (04 Jan 2020 04:40)  HR: 107 (04 Jan 2020 11:43) (89 - 113)  BP: 145/91 (04 Jan 2020 11:43) (134/84 - 158/76)  BP(mean): --  RR: 20 (04 Jan 2020 11:43) (18 - 20)  SpO2: 100% (04 Jan 2020 11:43) (95% - 100%)        PHYSICAL EXAM:  GENERAL: No apparent distress, appears chronically ill  HEAD:  Atraumatic, Normocephalic  EYES: Conjunctiva and sclera clear, no discharge  ENMT: Moist mucous membranes, no nasal discharge  NECK: Supple, no JVD  CHEST/LUNG: + wheeze / rhonchi, fair air movement  HEART: Irregular rate and rhythm, no murmurs, rubs or gallops  ABDOMEN: Soft, Nontender, Nondistended; Bowel sounds present  EXTREMITIES:  No clubbing, cyanosis but mild leg edema  SKIN: No rash or new discoloration  NERVOUS SYSTEM:  Alert & Oriented; Bilateral Lower extremity mobile, sensation to light touch intact      LABS:                        10.6   20.64 )-----------( 160      ( 04 Jan 2020 09:57 )             33.1     04 Jan 2020 05:56    133    |  95     |  25     ----------------------------<  144    3.4     |  24     |  0.93     Ca    9.1        04 Jan 2020 05:56      PT/INR - ( 04 Jan 2020 08:20 )   PT: 38.3 sec;   INR: 3.24 ratio             CAPILLARY BLOOD GLUCOSE      POCT Blood Glucose.: 176 mg/dL (04 Jan 2020 11:37)  POCT Blood Glucose.: 161 mg/dL (04 Jan 2020 08:09)      RADIOLOGY & ADDITIONAL TESTS:    Images reviewed personally    Consultant Notes Reviewed and Care Discussed with relevant Consultants/Other Providers.

## 2020-01-04 NOTE — PROVIDER CONTACT NOTE (OTHER) - ASSESSMENT
pt A&04, VSS. denies chest pain. C/o SOB. Pt ambulating/ using bathroom at time of event. Pt also having episodes of coughing.

## 2020-01-04 NOTE — PROGRESS NOTE ADULT - ASSESSMENT
Mr. Johnson is a 89 year old man with a history of CAD s/p PCI to the mid LAD with a BMS in 1994, double vessel CABG and mitral valve repair February 8th, 2013, paroxysmal atrial fibrillation/flutter with TBS s/p [facility] [facility] dual chamber PPM, on Coumadin for stroke risk reduction, hypertension and hyperlipidemia, mild LV dysfunction.  His most recent echo in our office demonstrated moderate to severe MR and moderate to severe TR with moderate pulmonary hypertension as of 2/2019. His EF was around 50%.    - he is demonstrating wheezing and shortness of breath, and there seems to be a viral component to this.  He is not improving, and feels worse today  - he has mild overload on exam, with small shirley effusions on cxr. He does have severe mr/tr and elevated pulmonary pressures. His BNP is elevated and has trended up.  WE performed a non contrast CT of the chest and findings consistent with pulmonary edema.  Need to maintain a net negative fluid balance with IV Lasix.  He is net negative 1800 per flow sheets.  Lasix to be dosed at 40 IV BId, and to be increased if urine output dips.    - Monitor I, O, K and creatinine  - Steroids and nebulizers increased yesterday  - Needs continued support with supplemental oxygen  - Chest PT  - replete K to >4, Mg>2    - af rates are extremely rapid at times, particularly with ambulation.  This is reactive to his underlying clinical condition.  Increase metoprolol to 100 bid and continue digoxin.  continue coumadin for goal inr 2-3.  - continue with losartan  - no sign of acute ischemia. his hs troponins are negative.   - oxygen supplementation as necessary  - watch creatinine and electrolytes. Keep K>4, Mg>2  - will follow with you.

## 2020-01-04 NOTE — PROGRESS NOTE ADULT - PROBLEM SELECTOR PLAN 1
Multifactorial, likely viral URI/bronchitis/RAD/given wheezing and cough and combined with decompensated HF with small b/l pleural effusions  -had worsening sob/wheezing/coughing overnight  -increase levalbuterol to q6 ATC, s/p IV Solumedrol 125mg, will c/w prednisone 40mg starting 1/4  -increase diuresis as below, CT chest findings noted  - 02 sat stable on RA  - low suspicion for PE, on coumadin now therapeutic   leukocytosis due to steroids  -c/w Mucinex 600mg bid, c/w tessalon jnenifer prn  - diuresis   - monitor on telemetry  - will consider pulm consult if continued respiratory symptoms

## 2020-01-04 NOTE — CONSULT NOTE ADULT - ASSESSMENT
1. Wheezing, SOB, minimal improvement with steroids and diuresis  Would resend RVP to eval for viral component. However, given lack of improvement and evidence of ground glass opacities on CT chest which can indicate infection vs. pulmonary edema, would start antibiotics - Levaquin 750 IV QD. Check urine legionella and blood cultures   c/w Steroids Prednisone 40 mg QD  Bronchodilators - Xopenex Q 4 hours  Supplemental O2, goal sats 92-96%  Incentive spirometer, out of bed as tolerated.    2. HFpEF, Afib with RVR  Cardiac optimization as per cardiology- labetalol increased today for rate control  c/w diuresis and strict ins/outs, goal net negative daily    3. DVT prophylaxis    Thank you for the consult. Will follow up. Assessment:  Pneumonia - viral vs. bacterial, hypoxemia  HFrEF  Atrial fibrillation with RVR  fluid overload  Hyponatremia    Rec:  1. Wheezing, SOB, minimal improvement with steroids and diuresis  Would resend RVP to eval for viral component. However, given lack of improvement and evidence of ground glass opacities on CT chest which can indicate infection vs. pulmonary edema, would start antibiotics - Levaquin 750 IV QD. Check urine legionella and blood cultures   c/w Steroids Prednisone 40 mg QD  Bronchodilators - Xopenex Q 4 hours followed by hypertonic inhaled saline and Acapella device to aid in airway clearance.   Supplemental O2, goal sats 92-96%  Incentive spirometer, out of bed as tolerated.    2. HFpEF, Afib with RVR  Cardiac optimization as per cardiology- labetalol increased today for rate control  c/w diuresis and strict ins/outs, goal net negative daily    3. DVT prophylaxis    Thank you for the consult. Will follow up. Assessment:  Pneumonia - viral vs. bacterial, hypoxemia  HFrEF  Atrial fibrillation with RVR  Pulmonary hypertension  MV insufficiency  fluid overload  Hyponatremia    Rec:  1. Wheezing, SOB, minimal improvement with steroids and diuresis  Would resend RVP to eval for viral component. However, given lack of improvement and evidence of ground glass opacities on CT chest which can indicate infection vs. pulmonary edema, would start antibiotics - Levaquin 750 IV QD. Check urine legionella and blood cultures   c/w Steroids Prednisone 40 mg QD  Bronchodilators - Xopenex Q 4 hours followed by hypertonic inhaled saline and Acapella device to aid in airway clearance.   Supplemental O2, goal sats 92-96%  Incentive spirometer, out of bed as tolerated.    2. HFpEF, Afib with RVR  Cardiac optimization as per cardiology- labetalol increased today for rate control  c/w diuresis and strict ins/outs, goal net negative daily    3. DVT prophylaxis    Thank you for the consult. Will follow up.

## 2020-01-04 NOTE — PROGRESS NOTE ADULT - PROBLEM SELECTOR PLAN 2
small b/l pleural effusions, LE edema, sob, still overloaded  diuresis per cardio  -TTE 2/2019 with EF 50%, moderate to severe MR and moderate to severe TR with moderate pulmonary HTN  -monitor strict I/O, daily weight  -monitor cr/lytes, replete prn

## 2020-01-04 NOTE — PROGRESS NOTE ADULT - SUBJECTIVE AND OBJECTIVE BOX
[facility] - Preet Glover, Tere, Andreea, Greg Kamara Savella  Office Number:  [phone]    Patient resting in bed.  He has significant respiratory distress thi AM.  His heart rate is uncontrolled when he ambulated, with periods of RVR up to 175.  He is hypoxic.  He is not feeling any better.       F/U for:  AF    Telemetry: AF, rapid at times.     MEDICATIONS  (STANDING):  atorvastatin 10 milliGRAM(s) Oral at bedtime  dextrose 5%. 1000 milliLiter(s) (50 mL/Hr) IV Continuous <Continuous>  dextrose 50% Injectable 12.5 Gram(s) IV Push once  dextrose 50% Injectable 25 Gram(s) IV Push once  dextrose 50% Injectable 25 Gram(s) IV Push once  digoxin     Tablet 0.125 milliGRAM(s) Oral every other day  furosemide   Injectable 40 milliGRAM(s) IV Push every 12 hours  guaiFENesin  milliGRAM(s) Oral every 12 hours  insulin lispro (HumaLOG) corrective regimen sliding scale   SubCutaneous three times a day before meals  levalbuterol Inhalation 0.63 milliGRAM(s) Inhalation every 6 hours  losartan 100 milliGRAM(s) Oral daily  metoprolol tartrate 100 milliGRAM(s) Oral two times a day  predniSONE   Tablet 40 milliGRAM(s) Oral daily  tamsulosin 0.8 milliGRAM(s) Oral at bedtime    MEDICATIONS  (PRN):  acetaminophen   Tablet .. 650 milliGRAM(s) Oral every 4 hours PRN Mild Pain (1 - 3)  benzonatate 100 milliGRAM(s) Oral three times a day PRN Cough  dextrose 40% Gel 15 Gram(s) Oral once PRN Blood Glucose LESS THAN 70 milliGRAM(s)/deciliter  glucagon  Injectable 1 milliGRAM(s) IntraMuscular once PRN Glucose LESS THAN 70 milligrams/deciliter      Allergies    No Known Allergies        Vital Signs Last 24 Hrs  T(C): 36.8 (04 Jan 2020 04:40), Max: 36.8 (04 Jan 2020 04:40)  T(F): 98.2 (04 Jan 2020 04:40), Max: 98.2 (04 Jan 2020 04:40)  HR: 113 (04 Jan 2020 04:40) (89 - 113)  BP: 158/76 (04 Jan 2020 04:40) (116/79 - 158/76)  BP(mean): --  RR: 20 (04 Jan 2020 04:40) (18 - 20)  SpO2: 95% (04 Jan 2020 04:40) (94% - 99%)    I&O's Summary    03 Jan 2020 07:01  -  04 Jan 2020 07:00  --------------------------------------------------------  IN: 720 mL / OUT: 2520 mL / NET: -1800 mL        ON EXAM:    General: NAD, mild to moderate resp distress  HEENT: Mucous membranes are moist, anicteric  Lungs: Labored breathing with b/l wheezing and rhonchi  Cardiovascular: Irrgular, S1 and S2, tachycardia  Gastrointestinal: Bowel Sounds present, soft, nontender.   Lymph: Minimal peripheral edema. No lymphadenopathy.  Skin: No rashes or ulcers  Psych:  Mood & affect appropriate    LABS: All Labs Reviewed:                        10.8   10.80 )-----------( 150      ( 03 Jan 2020 08:32 )             33.4                         10.2   4.46  )-----------( 133      ( 02 Jan 2020 08:51 )             31.5     04 Jan 2020 05:56    133    |  95     |  25     ----------------------------<  144    3.4     |  24     |  0.93   03 Jan 2020 06:34    133    |  98     |  22     ----------------------------<  133    3.6     |  24     |  0.87   02 Jan 2020 06:31    137    |  102    |  19     ----------------------------<  160    3.6     |  23     |  0.80     Ca    9.1        04 Jan 2020 05:56  Ca    9.4        03 Jan 2020 06:34  Ca    9.3        02 Jan 2020 06:31  Phos  2.7       03 Jan 2020 06:34  Mg     1.9       03 Jan 2020 06:34      PT/INR - ( 03 Jan 2020 08:30 )   PT: 36.3 sec;   INR: 3.05 ratio               Blood Culture:   01-02 @ 06:31  Pro Bnp [id number]      < from: CT Chest No Cont (01.03.20 @ 14:18) >    EXAM:  CT CHEST                            PROCEDURE DATE:  01/03/2020            INTERPRETATION:  CLINICAL INFORMATION: Shortness of breath and coughing    COMPARISON: CT chest 3/9/2017.    PROCEDURE:   CT of the Chest was performed without intravenous contrast.  Sagittal and coronal reformats were performed.    FINDINGS:    No axillary adenopathy. Small mediastinal and hilar lymph nodes. The thyroid is normal.    Calcifications of the thoracic aorta, which is normal in size and contour. The heart is enlarged. Coronary artery calcifications. Mitral annuloplasty. Left-sided dual-lead cardiac pacer.    No endobronchial lesion. Mosaic attenuation of the lungs. Small bilateral pleural effusions, right greater than left, with adjacent compressive atelectasis.     Nodular and groundglass opacities in the bilateral lower lobes, right greater than left. A few subpleural and perifissural opacities, likely lymph nodes. The lungs are otherwise clear. No pneumothorax.    Below the diaphragm, thereis redemonstration of a dilated main pancreatic duct and slight atrophy of the distal body and tail of the pancreas. Right renal cyst.    Evaluation of the osseous structures demonstrate a sternotomy and degenerative changes of the spine. There is a left cervical rib.    IMPRESSION:     1.  Nodular and groundglass opacities in the bilateral lower lobes, right greater than left, which can be seen in the setting of infection or alternatively pulmonary edema.  2.  Small bilateral pleural effusions with adjacent compressive atelectasis.  3.  No change in the pancreatic ductal dilatation or atrophy of the distal body and tail of the pancreas.                TITI PINEDA M.D., RADIOLOGY RESIDENT  This document has been electronically signed.  SKYLA FRENCH M.D., ATTENDING RADIOLOGIST  This document has been electronically signed. Inocencio  3 2020  4:40PM              < end of copied text >

## 2020-01-04 NOTE — PROVIDER CONTACT NOTE (CRITICAL VALUE NOTIFICATION) - ACTION/TREATMENT ORDERED:
PA notified. Ordered contact isolation ,Continue to monitor. Room made to a contact precautions room.

## 2020-01-04 NOTE — PROVIDER CONTACT NOTE (CRITICAL VALUE NOTIFICATION) - BACKGROUND
Pt is admitted with SOB, likely multifactorial from viral URI/bronchitis and combined decompensated CHF 	(bilateral pleural effusions) on IV Lasix / Afib with RVR on Digoxin and Daily Coumadin

## 2020-01-04 NOTE — PROGRESS NOTE ADULT - PROBLEM SELECTOR PLAN 3
lopressor to 100mg bid, digoxin 0.125mg every other day  -INR 3+, will hold coumadin tonight (home dose 3-4mg), monitor inr

## 2020-01-04 NOTE — CONSULT NOTE ADULT - SUBJECTIVE AND OBJECTIVE BOX
[facility] - Division of Pulmonary, Critical Care and Sleep Medicine   Please call [phone] between 8am-pm weekdays, [phone] after hours and weekends      CHIEF COMPLAINT: Wheezing and SOB    HPI: 89 yo M with a h/o Afib on coumadin s/p pacemaker, moderate pulm HTN, moderate-severe MR CAD, GERD, HLD, HTN, MV repair, BPH with urinary retention s/p recent transurethral bladder tumor resection in [facility] on 12/10, p/w wheezing and progressive dyspnea x 4 days. +mild nasal congestion and rhinorrhea. Denies fevers, chills or chest pain, no N/V. +intermittent palpitations . +chronic LE edema, states unchanged. Denies calf tenderness, recent travel or sick contacts.    Admitted  - Being treated for reactive airways disease 2/2 viral URI with Prednisone and diuresis  RVP negative, initial CXR with small b/l pleural effusions; probnp [phone]  Afebrile  Minimal improvement of respiratory symptoms - CT chest 1/3 - b/l lower lobe ground glass opacities and small b/l pleural effusions with adjacent atelectasis  No leukocytosis on admission, now WBC of 20K.   EKG and Tele with Afib with RVR  reports significant dyspnea on minimal exertion, HR increases up to the 170s at times.      PAST MEDICAL & SURGICAL HISTORY:  Phimosis  Seizure: age 9, had 1 seizure, s/p fall  GERD (gastroesophageal reflux disease)  Valvular heart disease: S/p mitral valve repair  CAD (coronary artery disease): BMS x 1  in the LAD ; S/P CABG x2V   BPH (benign prostatic hyperplasia)  HLD (hyperlipidemia)  HTN (hypertension)  Atrial fibrillation  Cataract  Glaucoma  H/O circumcision: 2018  Artificial pacemaker: [employer] Medical Model #[id number] Serial #[id number]  S/P heart valve repair: mitral valve repair with annuloplasty ring   S/P CABG (coronary artery bypass graft): x2 vessels   S/P small bowel resection: due to perforated intestine  during colonoscopy around   Breast cyst: bilateral around 50 years ago  Stented coronary artery:       FAMILY HISTORY:  Family history of ischemic heart disease      SOCIAL HISTORY:  Smoking: [ ] Never Smoked [ ] Former Smoker (__ packs x ___ years) [ ] Current Smoker  (__ packs x ___ years)  Substance Use: [ ] Never Used [ ] Used ____  EtOH Use:  Marital Status: [ ] Single [ ]  [ ]  [ ]   Occupation:  Recent Travel:  Country of Birth:  Advance Directives:    Allergies    No Known Allergies    Intolerances        HOME MEDICATIONS: reviewed from H&P    REVIEW OF SYSTEMS:  Constitutional: [ ] fevers [ ] chills [ ] weight loss [ ] weight gain  HEENT: [ ] dry eyes [ ] eye irritation [ ] postnasal drip [ ] nasal congestion  CV: [ ] chest pain [ ] orthopnea [ ] palpitations [ ] murmur  Resp: [ ] cough [ ] shortness of breath [ ] wheezing [ ] sputum [ ] hemoptysis  GI: [ ] nausea [ ] vomiting [ ] diarrhea [ ] constipation [ ] abd pain [ ] dysphagia   : [ ] dysuria [ ] nocturia [ ] hematuria [ ] increased urinary frequency  Musculoskeletal: [ ] myalgias [ ] arthralgias   Skin: [ ] rash [ ] itch  Neurological: [ ] headache [ ] dizziness [ ] syncope [ ] weakness [ ] numbness  Psychiatric: [ ] anxiety [ ] depression  Endocrine: [ ] diabetes [ ] thyroid problem  Hematologic/Lymphatic: [ ] anemia [ ] bleeding problem  Allergic/Immunologic: [ ] itchy eyes [ ] nasal discharge [ ] hives [ ] angioedema  [ ] All other systems negative  [ ] Unable to assess ROS because ________    OBJECTIVE:  ICU Vital Signs Last 24 Hrs  T(C): 36.8 (2020 04:40), Max: 36.8 (2020 04:40)  T(F): 98.2 (2020 04:40), Max: 98.2 (2020 04:40)  HR: 102 (2020 17:20) (89 - 113)  BP: 133/91 (2020 17:20) (133/91 - 158/76)  BP(mean): --  ABP: --  ABP(mean): --  RR: 18 (2020 17:20) (18 - 20)  SpO2: 98% (2020 17:20) (95% - 100%)        01-03 @ 07:01  -  01-04 @ 07:00  --------------------------------------------------------  IN: 720 mL / OUT: 2520 mL / NET: -1800 mL     @ 07:01   @ 17:36  --------------------------------------------------------  IN: 1375 mL / OUT: 800 mL / NET: 575 mL      CAPILLARY BLOOD GLUCOSE      POCT Blood Glucose.: 206 mg/dL (2020 16:26)      PHYSICAL EXAM:  General:  NAD  HEENT:  NC/AT  Lymph Nodes: No cervical or supraclavicular lymphadenopathy   Neck: Supple  Respiratory:  CTA B/L, no wheezes, crackles or rhonchi  Cardiovascular:  RRR, no m/r/g  Abdomen: soft, NT/ND, +BS  Extremities: no clubbing, cyanosis or edema, warm  Skin: no rash  Neurological: AAOx3, no focal deficits  Psychiatry: not anxious, normal affect    HOSPITAL MEDICATIONS:  MEDICATIONS  (STANDING):  atorvastatin 10 milliGRAM(s) Oral at bedtime  cefTRIAXone   IVPB      dextrose 5%. 1000 milliLiter(s) (50 mL/Hr) IV Continuous <Continuous>  dextrose 50% Injectable 12.5 Gram(s) IV Push once  dextrose 50% Injectable 25 Gram(s) IV Push once  dextrose 50% Injectable 25 Gram(s) IV Push once  digoxin     Tablet 0.125 milliGRAM(s) Oral every other day  furosemide   Injectable 40 milliGRAM(s) IV Push every 12 hours  guaiFENesin  milliGRAM(s) Oral every 12 hours  insulin lispro (HumaLOG) corrective regimen sliding scale   SubCutaneous three times a day before meals  levalbuterol Inhalation 0.63 milliGRAM(s) Inhalation every 6 hours  losartan 100 milliGRAM(s) Oral daily  metoprolol tartrate 100 milliGRAM(s) Oral two times a day  predniSONE   Tablet 40 milliGRAM(s) Oral daily  tamsulosin 0.8 milliGRAM(s) Oral at bedtime    MEDICATIONS  (PRN):  acetaminophen   Tablet .. 650 milliGRAM(s) Oral every 4 hours PRN Mild Pain (1 - 3)  benzonatate 100 milliGRAM(s) Oral three times a day PRN Cough  dextrose 40% Gel 15 Gram(s) Oral once PRN Blood Glucose LESS THAN 70 milliGRAM(s)/deciliter  glucagon  Injectable 1 milliGRAM(s) IntraMuscular once PRN Glucose LESS THAN 70 milligrams/deciliter      LABS:                        10.6   20.64 )-----------( 160      ( 2020 09:57 )             33.1     Hgb Trend: 10.6<--, 10.8<--, 10.2<--, 9.9<--, 11.2<--  01-04    133<L>  |  95<L>  |  25<H>  ----------------------------<  144<H>  3.4<L>   |  24  |  0.93    Ca    9.1      2020 05:56  Phos  2.7       Mg     1.9     03      Creatinine Trend: 0.93<--, 0.87<--, 0.80<--, 0.93<--, 0.92<--  PT/INR - ( 2020 08:20 )   PT: 38.3 sec;   INR: 3.24 ratio                   MICROBIOLOGY:   RVP - negative    RADIOLOGY:  [x ] Reviewed and interpreted by me  < from: CT Chest No Cont (20 @ 14:18) >  EXAM:  CT CHEST                            PROCEDURE DATE:  2020            INTERPRETATION:  CLINICAL INFORMATION: Shortness of breath and coughing    COMPARISON: CT chest 3/9/2017.    PROCEDURE:   CT of the Chest was performed without intravenous contrast.  Sagittal and coronal reformats were performed.    FINDINGS:    No axillary adenopathy. Small mediastinal and hilar lymph nodes. The thyroid is normal.    Calcifications of the thoracic aorta, which is normal in size and contour. The heart is enlarged. Coronary artery calcifications. Mitral annuloplasty. Left-sided dual-lead cardiac pacer.    No endobronchial lesion. Mosaic attenuation of the lungs. Small bilateral pleural effusions, right greater than left, with adjacent compressive atelectasis.     Nodular and groundglass opacities in the bilateral lower lobes, right greater than left. A few subpleural and perifissural opacities, likely lymph nodes. The lungs are otherwise clear. No pneumothorax.    Below the diaphragm, thereis redemonstration of a dilated main pancreatic duct and slight atrophy of the distal body and tail of the pancreas. Right renal cyst.    Evaluation of the osseous structures demonstrate a sternotomy and degenerative changes of the spine. There is a left cervical rib.    IMPRESSION:     1.  Nodular and groundglass opacities in the bilateral lower lobes, right greater than left, which can be seen in the setting of infection or alternatively pulmonary edema.  2.  Small bilateral pleural effusions with adjacent compressive atelectasis.  3.  No change in the pancreatic ductal dilatation or atrophy of the distal body and tail of the pancreas.    TITI PINEDA M.D., RADIOLOGY RESIDENT  This document has been electronically signed.  SKYLA FRENCH M.D., ATTENDING RADIOLOGIST  This document has been electronically signed. Inocencio  3 2020  4:40PM    < end of copied text >    < from: Xray Chest 2 Views PA/Lat (19 @ 18:53) >  EXAM:  XR CHEST PA LAT 2V                            PROCEDURE DATE:  2019            INTERPRETATION:  CLINICAL INFORMATION: Expiratory stridor.    EXAM: PA and lateral chest radiographs.    COMPARISON: Chest radiograph from 10/20/2019.    FINDINGS:  Surgical clips in left upper quadrant.  Left-sided dual-lead cardiac pacemaker. Patient status post sternotomy and mitral valvuloplasty. The heart is enlarged.  Small bilateral pleural effusions, right greater than left. No pneumothorax.  Thevisualized osseous structures demonstrate no acute pathology.    IMPRESSION:  Small bilateral pleural effusions, right greater than left.      JASMIN ROWLEY M.D., RADIOLOGY RESIDENT  This document has been electronically signed.  JASMIN JOY M.D., ATTENDING RADIOLOGIST  This document has been electronically signed. Dec 31 2019  8:35AM    < end of copied text >      PULMONARY FUNCTION TESTS: none available    EK19- Afib with RVR at 111 bpm    Echo 2019 - moderate to severe MR, moderate pulm htn, LVEF 50%, LA enlarged [facility] - Division of Pulmonary, Critical Care and Sleep Medicine   Please call [phone] between 8am-pm weekdays, [phone] after hours and weekends      CHIEF COMPLAINT: Wheezing and SOB    HPI: 89 yo M with a h/o Afib on coumadin s/p pacemaker, moderate pulm HTN, moderate-severe MR CAD, GERD, HLD, HTN, MV repair, BPH with urinary retention s/p recent transurethral bladder tumor resection in [facility] on 12/10, p/w wheezing and progressive dyspnea x 4 days. +mild nasal congestion and rhinorrhea. Denies fevers, chills or chest pain, no N/V. +intermittent palpitations . +chronic LE edema, states unchanged. Denies calf tenderness, recent travel or sick contacts.    Admitted  - Being treated for reactive airways disease 2/2 viral URI with Prednisone and diuresis  RVP negative, initial CXR with small b/l pleural effusions; probnp [phone]  Afebrile throughout  Minimal improvement of respiratory symptoms - CT chest 1/3 - b/l lower lobe ground glass opacities and small b/l pleural effusions with adjacent atelectasis  No leukocytosis on admission, now WBC of 20K.   EKG and Tele with Afib with RVR. Reports significant dyspnea on minimal exertion, HR increases up to the 170s at times.   Throughout the exam, he is coughing continuously, unable to expectorate, yellow sputum when he does     PAST MEDICAL & SURGICAL HISTORY:  Phimosis  Seizure: age 9, had 1 seizure, s/p fall  GERD (gastroesophageal reflux disease)  Valvular heart disease: S/p mitral valve repair  CAD (coronary artery disease): BMS x 1  in the LAD ; S/P CABG x2V   BPH (benign prostatic hyperplasia)  HLD (hyperlipidemia)  HTN (hypertension)  Atrial fibrillation  Cataract  Glaucoma  H/O circumcision: 2018  Artificial pacemaker: [employer] Medical Model #[id number] Serial #[id number]  S/P heart valve repair: mitral valve repair with annuloplasty ring   S/P CABG (coronary artery bypass graft): x2 vessels   S/P small bowel resection: due to perforated intestine  during colonoscopy around   Breast cyst: bilateral around 50 years ago  Stented coronary artery:       FAMILY HISTORY:  Family history of ischemic heart disease      SOCIAL HISTORY:  Smoking: [x ] Never Smoked [ ] Former Smoker (__ packs x ___ years) [ ] Current Smoker  (__ packs x ___ years)  Substance Use: [ x] Never Used [ ] Used ____  EtOH Use: denies    Allergies  No Known Allergies    HOME MEDICATIONS: reviewed from H&P    REVIEW OF SYSTEMS:  Constitutional: [ ] fevers [ ] chills [ ] weight loss [ ] weight gain  HEENT: [ ] dry eyes [ ] eye irritation [ ] postnasal drip [ ] nasal congestion  CV: [ ] chest pain [ ] orthopnea [ ] palpitations [ ] murmur  Resp: [x ] cough [x ] shortness of breath [x ] wheezing [x ] sputum [ ] hemoptysis  GI: [ ] nausea [ ] vomiting [ ] diarrhea [ ] constipation [ ] abd pain [ ] dysphagia   : [ ] dysuria [ ] nocturia [ ] hematuria [ ] increased urinary frequency  Musculoskeletal: [ ] myalgias [ ] arthralgias   Skin: [ ] rash [ ] itch  Neurological: [ ] headache [ ] dizziness [ ] syncope [ ] weakness [ ] numbness  Psychiatric: [ ] anxiety [ ] depression  Endocrine: [ ] diabetes [ ] thyroid problem  Hematologic/Lymphatic: [ ] anemia [ ] bleeding problem  Allergic/Immunologic: [ ] itchy eyes [ ] nasal discharge [ ] hives [ ] angioedema  [x ] All other systems negative  [ ] Unable to assess ROS because ________    OBJECTIVE:  ICU Vital Signs Last 24 Hrs  T(C): 36.8 (2020 04:40), Max: 36.8 (2020 04:40)  T(F): 98.2 (2020 04:40), Max: 98.2 (2020 04:40)  HR: 102 (2020 17:20) (89 - 113)  BP: 133/91 (2020 17:20) (133/91 - 158/76)  BP(mean): --  ABP: --  ABP(mean): --  RR: 18 (2020 17:20) (18 - 20)  SpO2: 98% (2020 17:20) (95% - 100%)        -03 @ 07:01  -  01-04 @ 07:00  --------------------------------------------------------  IN: 720 mL / OUT: 2520 mL / NET: -1800 mL     @ 07:01   @ 17:36  --------------------------------------------------------  IN: 1375 mL / OUT: 800 mL / NET: 575 mL      CAPILLARY BLOOD GLUCOSE      POCT Blood Glucose.: 206 mg/dL (2020 16:26)      PHYSICAL EXAM:  General:  coughing, moderate distress  HEENT:  NC/AT  Neck: Supple  Respiratory:  scattered wheezes and rhonchi  Cardiovascular:  irreg irreg  Abdomen: soft, NT/ND, +BS  Extremities: no clubbing, cyanosis or edema, warm  Skin: no rash  Neurological: AAOx3, no focal deficits      HOSPITAL MEDICATIONS:  MEDICATIONS  (STANDING):  atorvastatin 10 milliGRAM(s) Oral at bedtime  cefTRIAXone   IVPB      dextrose 5%. 1000 milliLiter(s) (50 mL/Hr) IV Continuous <Continuous>  dextrose 50% Injectable 12.5 Gram(s) IV Push once  dextrose 50% Injectable 25 Gram(s) IV Push once  dextrose 50% Injectable 25 Gram(s) IV Push once  digoxin     Tablet 0.125 milliGRAM(s) Oral every other day  furosemide   Injectable 40 milliGRAM(s) IV Push every 12 hours  guaiFENesin  milliGRAM(s) Oral every 12 hours  insulin lispro (HumaLOG) corrective regimen sliding scale   SubCutaneous three times a day before meals  levalbuterol Inhalation 0.63 milliGRAM(s) Inhalation every 6 hours  losartan 100 milliGRAM(s) Oral daily  metoprolol tartrate 100 milliGRAM(s) Oral two times a day  predniSONE   Tablet 40 milliGRAM(s) Oral daily  tamsulosin 0.8 milliGRAM(s) Oral at bedtime    MEDICATIONS  (PRN):  acetaminophen   Tablet .. 650 milliGRAM(s) Oral every 4 hours PRN Mild Pain (1 - 3)  benzonatate 100 milliGRAM(s) Oral three times a day PRN Cough  dextrose 40% Gel 15 Gram(s) Oral once PRN Blood Glucose LESS THAN 70 milliGRAM(s)/deciliter  glucagon  Injectable 1 milliGRAM(s) IntraMuscular once PRN Glucose LESS THAN 70 milligrams/deciliter      LABS:                        10.6   20.64 )-----------( 160      ( 2020 09:57 )             33.1     Hgb Trend: 10.6<--, 10.8<--, 10.2<--, 9.9<--, 11.2<--  04    133<L>  |  95<L>  |  25<H>  ----------------------------<  144<H>  3.4<L>   |  24  |  0.93    Ca    9.1      2020 05:56  Phos  2.7       Mg     1.9           Creatinine Trend: 0.93<--, 0.87<--, 0.80<--, 0.93<--, 0.92<--  PT/INR - ( 2020 08:20 )   PT: 38.3 sec;   INR: 3.24 ratio                   MICROBIOLOGY:   RVP - negative    RADIOLOGY:  [x ] Reviewed and interpreted by me  < from: CT Chest No Cont (20 @ 14:18) >  EXAM:  CT CHEST                            PROCEDURE DATE:  2020            INTERPRETATION:  CLINICAL INFORMATION: Shortness of breath and coughing    COMPARISON: CT chest 3/9/2017.    PROCEDURE:   CT of the Chest was performed without intravenous contrast.  Sagittal and coronal reformats were performed.    FINDINGS:    No axillary adenopathy. Small mediastinal and hilar lymph nodes. The thyroid is normal.    Calcifications of the thoracic aorta, which is normal in size and contour. The heart is enlarged. Coronary artery calcifications. Mitral annuloplasty. Left-sided dual-lead cardiac pacer.    No endobronchial lesion. Mosaic attenuation of the lungs. Small bilateral pleural effusions, right greater than left, with adjacent compressive atelectasis.     Nodular and groundglass opacities in the bilateral lower lobes, right greater than left. A few subpleural and perifissural opacities, likely lymph nodes. The lungs are otherwise clear. No pneumothorax.    Below the diaphragm, thereis redemonstration of a dilated main pancreatic duct and slight atrophy of the distal body and tail of the pancreas. Right renal cyst.    Evaluation of the osseous structures demonstrate a sternotomy and degenerative changes of the spine. There is a left cervical rib.    IMPRESSION:     1.  Nodular and groundglass opacities in the bilateral lower lobes, right greater than left, which can be seen in the setting of infection or alternatively pulmonary edema.  2.  Small bilateral pleural effusions with adjacent compressive atelectasis.  3.  No change in the pancreatic ductal dilatation or atrophy of the distal body and tail of the pancreas.    TITI PINEDA M.D., RADIOLOGY RESIDENT  This document has been electronically signed.  SKYLA FRENCH M.D., ATTENDING RADIOLOGIST  This document has been electronically signed. Inocencio  3 2020  4:40PM    < end of copied text >    < from: Xray Chest 2 Views PA/Lat (19 @ 18:53) >  EXAM:  XR CHEST PA LAT 2V                            PROCEDURE DATE:  2019            INTERPRETATION:  CLINICAL INFORMATION: Expiratory stridor.    EXAM: PA and lateral chest radiographs.    COMPARISON: Chest radiograph from 10/20/2019.    FINDINGS:  Surgical clips in left upper quadrant.  Left-sided dual-lead cardiac pacemaker. Patient status post sternotomy and mitral valvuloplasty. The heart is enlarged.  Small bilateral pleural effusions, right greater than left. No pneumothorax.  Thevisualized osseous structures demonstrate no acute pathology.    IMPRESSION:  Small bilateral pleural effusions, right greater than left.      JASMIN ROWLEY M.D., RADIOLOGY RESIDENT  This document has been electronically signed.  JASMIN JOY M.D., ATTENDING RADIOLOGIST  This document has been electronically signed. Dec 31 2019  8:35AM    < end of copied text >      PULMONARY FUNCTION TESTS: none available    EK19- Afib with RVR at 111 bpm    Echo 2019 - moderate to severe MR, moderate pulm htn, LVEF 50%, LA enlarged

## 2020-01-05 NOTE — PROGRESS NOTE ADULT - ASSESSMENT
89M PMH Afib (coumadin), PPM, mod pHTN, mitral regurgitation, MVR, BPH presents with dyspnea and wheezing found to have bilateral bibasilar GGO on CT and is RVP+ for hMPV.       # Wheezing, SOB, minimal improvement with steroids and diuresis now in the context of hMPV positivity  - Would continue with Levaquin 750 IV QD.   - f/u urine legionella and blood cultures   - c/w Steroids Prednisone 40 mg QD  - Bronchodilators - Xopenex Q 4 hours followed by hypertonic inhaled saline and Acapella device to aid in airway clearance.   - Supplemental O2, goal sats 92-96%  - Incentive spirometer, out of bed as tolerated.    2. HFpEF, Afib with RVR  - Cardiac optimization as per cardiology  - c/w diuresis and strict ins/outs, goal net negative daily    3. DVT prophylaxis    Thank you for the consult. Will follow up.         Etienne Cross MD  PGY-5  Pulmonary and Critical Care Fellow  Pager: [phone]

## 2020-01-05 NOTE — PROGRESS NOTE ADULT - SUBJECTIVE AND OBJECTIVE BOX
[facility] - Preet Glover, Tere, Andreea, Anh, Juan Carlos Garza  Office Number:  [phone]    Patient resting comfortably in bed in [facility].  He is breathing a bit better than yesterday, but still labored.  He was seen by pulmonary, and started on abx.    F/U for:  CHF    Telemetry:  AF, better rate controlled than yesterday    MEDICATIONS  (STANDING):  atorvastatin 10 milliGRAM(s) Oral at bedtime  dextrose 5%. 1000 milliLiter(s) (50 mL/Hr) IV Continuous <Continuous>  dextrose 50% Injectable 12.5 Gram(s) IV Push once  dextrose 50% Injectable 25 Gram(s) IV Push once  dextrose 50% Injectable 25 Gram(s) IV Push once  digoxin     Tablet 0.125 milliGRAM(s) Oral every other day  furosemide   Injectable 40 milliGRAM(s) IV Push every 12 hours  guaiFENesin  milliGRAM(s) Oral every 12 hours  insulin lispro (HumaLOG) corrective regimen sliding scale   SubCutaneous three times a day before meals  levalbuterol Inhalation 0.63 milliGRAM(s) Inhalation every 6 hours  levoFLOXacin IVPB 750 milliGRAM(s) IV Intermittent every 24 hours  levoFLOXacin IVPB      losartan 100 milliGRAM(s) Oral daily  metoprolol tartrate 100 milliGRAM(s) Oral two times a day  predniSONE   Tablet 40 milliGRAM(s) Oral daily  sodium chloride 3%  Inhalation 4 milliLiter(s) Inhalation every 6 hours  tamsulosin 0.8 milliGRAM(s) Oral at bedtime    MEDICATIONS  (PRN):  acetaminophen   Tablet .. 650 milliGRAM(s) Oral every 4 hours PRN Mild Pain (1 - 3)  benzonatate 100 milliGRAM(s) Oral three times a day PRN Cough  dextrose 40% Gel 15 Gram(s) Oral once PRN Blood Glucose LESS THAN 70 milliGRAM(s)/deciliter  glucagon  Injectable 1 milliGRAM(s) IntraMuscular once PRN Glucose LESS THAN 70 milligrams/deciliter      Allergies    No Known Allergies    Intolerances        Vital Signs Last 24 Hrs  T(C): 36.7 (05 Jan 2020 04:43), Max: 36.7 (04 Jan 2020 20:41)  T(F): 98 (05 Jan 2020 04:43), Max: 98 (04 Jan 2020 20:41)  HR: 94 (05 Jan 2020 04:43) (64 - 107)  BP: 144/84 (05 Jan 2020 04:43) (127/77 - 145/91)  BP(mean): --  RR: 17 (05 Jan 2020 04:43) (17 - 20)  SpO2: 100% (05 Jan 2020 04:43) (98% - 100%)    I&O's Summary    04 Jan 2020 07:01  -  05 Jan 2020 07:00  --------------------------------------------------------  IN: 1745 mL / OUT: 2600 mL / NET: -855 mL    05 Jan 2020 07:01  -  05 Jan 2020 08:30  --------------------------------------------------------  IN: 0 mL / OUT: 800 mL / NET: -800 mL        ON EXAM:    General: NAD, mild to moderate resp distress  HEENT: Mucous membranes are moist, anicteric  Lungs: Labored breathing with b/l wheezing and rhonchi  Cardiovascular: Irrgular, S1 and S2, tachycardia  Gastrointestinal: Bowel Sounds present, soft, nontender.   Lymph: Minimal peripheral edema. No lymphadenopathy.  Skin: No rashes or ulcers  Psych:  Mood & affect appropriate      LABS: All Labs Reviewed:                        10.6   20.64 )-----------( 160      ( 04 Jan 2020 09:57 )             33.1                         10.8   10.80 )-----------( 150      ( 03 Jan 2020 08:32 )             33.4                         10.2   4.46  )-----------( 133      ( 02 Jan 2020 08:51 )             31.5     05 Jan 2020 06:22    139    |  98     |  26     ----------------------------<  104    3.8     |  26     |  1.05   04 Jan 2020 05:56    133    |  95     |  25     ----------------------------<  144    3.4     |  24     |  0.93   03 Jan 2020 06:34    133    |  98     |  22     ----------------------------<  133    3.6     |  24     |  0.87     Ca    9.0        05 Jan 2020 06:22  Ca    9.1        04 Jan 2020 05:56  Ca    9.4        03 Jan 2020 06:34  Phos  2.7       03 Jan 2020 06:34  Mg     1.9       03 Jan 2020 06:34    TPro  6.0    /  Alb  3.2    /  TBili  0.6    /  DBili  x      /  AST  14     /  ALT  16     /  AlkPhos  128    05 Jan 2020 06:22    PT/INR - ( 04 Jan 2020 08:20 )   PT: 38.3 sec;   INR: 3.24 ratio Initial (On Arrival)

## 2020-01-05 NOTE — PROGRESS NOTE ADULT - SUBJECTIVE AND OBJECTIVE BOX
CHIEF COMPLAINT:    Interval Events: Patient states that he is experiencing less wheezing; however, still present. Still coughing. No labored breathing. No fevers or chills or CP. On exam, +wheezing and rales throughout all lung fields.     REVIEW OF SYSTEMS:  CONSTITUTIONAL: +weakness. No fevers or chills  EYES/ENT: No visual changes;  No vertigo or throat pain   NECK: No pain or stiffness  RESPIRATORY: +cough, wheezing. No hemoptysis; +post tussive shortness of breath  CARDIOVASCULAR: No chest pain or palpitations  GASTROINTESTINAL: No abdominal or epigastric pain. No nausea, vomiting, or hematemesis; No diarrhea or constipation. No melena or hematochezia.  GENITOURINARY: No dysuria, frequency or hematuria  NEUROLOGICAL: No numbness or weakness  SKIN: No itching, burning, rashes, or lesions   All other review of systems is negative unless indicated above.    OBJECTIVE:  ICU Vital Signs Last 24 Hrs  T(C): 36.7 (05 Jan 2020 04:43), Max: 36.7 (04 Jan 2020 20:41)  T(F): 98 (05 Jan 2020 04:43), Max: 98 (04 Jan 2020 20:41)  HR: 94 (05 Jan 2020 04:43) (64 - 107)  BP: 144/84 (05 Jan 2020 04:43) (127/77 - 145/91)  BP(mean): --  ABP: --  ABP(mean): --  RR: 17 (05 Jan 2020 04:43) (17 - 20)  SpO2: 100% (05 Jan 2020 04:43) (98% - 100%)        01-04 @ 07:01 - 01-05 @ 07:00  --------------------------------------------------------  IN: 1745 mL / OUT: 2600 mL / NET: -855 mL    01-05 @ 07:01 - 01-05 @ 10:53  --------------------------------------------------------  IN: 240 mL / OUT: 800 mL / NET: -560 mL      CAPILLARY BLOOD GLUCOSE      POCT Blood Glucose.: 117 mg/dL (05 Jan 2020 07:37)      PHYSICAL EXAM:  General: WN/WD NAD  Neurology: A&Ox3, nonfocal, DRAPER x 4  Eyes: PERRLA/ EOMI, Gross vision intact  ENT/Neck: Neck supple, trachea midline, No JVD, Gross hearing intact  Respiratory: +wheezing and rales. no rhonchi.   CV: RRR, +S1/S2, -S3/S4, no murmurs, rubs or gallops  Abdominal: Soft, NT, ND +BS, No HSM  MSK: 5/5 strength UE/LE bilaterally  Extremities: No edema, 2+ peripheral pulses  Skin: No Rashes, Hematoma, Ecchymosis      HOSPITAL MEDICATIONS:  MEDICATIONS  (STANDING):  atorvastatin 10 milliGRAM(s) Oral at bedtime  dextrose 5%. 1000 milliLiter(s) (50 mL/Hr) IV Continuous <Continuous>  dextrose 50% Injectable 12.5 Gram(s) IV Push once  dextrose 50% Injectable 25 Gram(s) IV Push once  dextrose 50% Injectable 25 Gram(s) IV Push once  digoxin     Tablet 0.125 milliGRAM(s) Oral every other day  furosemide   Injectable 40 milliGRAM(s) IV Push every 12 hours  guaiFENesin  milliGRAM(s) Oral every 12 hours  insulin lispro (HumaLOG) corrective regimen sliding scale   SubCutaneous three times a day before meals  levalbuterol Inhalation 0.63 milliGRAM(s) Inhalation every 6 hours  levoFLOXacin IVPB 750 milliGRAM(s) IV Intermittent every 24 hours  levoFLOXacin IVPB      losartan 100 milliGRAM(s) Oral daily  metoprolol tartrate 100 milliGRAM(s) Oral two times a day  predniSONE   Tablet 40 milliGRAM(s) Oral daily  sodium chloride 3%  Inhalation 4 milliLiter(s) Inhalation every 6 hours  tamsulosin 0.8 milliGRAM(s) Oral at bedtime    MEDICATIONS  (PRN):  acetaminophen   Tablet .. 650 milliGRAM(s) Oral every 4 hours PRN Mild Pain (1 - 3)  benzonatate 100 milliGRAM(s) Oral three times a day PRN Cough  dextrose 40% Gel 15 Gram(s) Oral once PRN Blood Glucose LESS THAN 70 milliGRAM(s)/deciliter  glucagon  Injectable 1 milliGRAM(s) IntraMuscular once PRN Glucose LESS THAN 70 milligrams/deciliter      LABS:                        10.3   9.86  )-----------( 140      ( 05 Jan 2020 08:56 )             31.1     Hgb Trend: 10.3<--, 10.6<--, 10.8<--, 10.2<--, 9.9<--  01-05    139  |  98  |  26<H>  ----------------------------<  104<H>  3.8   |  26  |  1.05    Ca    9.0      05 Jan 2020 06:22    TPro  6.0  /  Alb  3.2<L>  /  TBili  0.6  /  DBili  x   /  AST  14  /  ALT  16  /  AlkPhos  128<H>  01-05    Creatinine Trend: 1.05<--, 0.93<--, 0.87<--, 0.80<--, 0.93<--, 0.92<--  PT/INR - ( 05 Jan 2020 08:45 )   PT: 31.8 sec;   INR: 2.68 ratio         Rapid Respiratory Viral Panel (01.04.20 @ 18:56)    Rapid RVP Result: Detected: This Respiratory Panel uses polymerase chain reaction (PCR) to detect for  adenovirus; coronavirus (HKU1, NL63, 229E, OC43); human metapneumovirus  (hMPV); human enterovirus/rhinovirus (Entero/RV); influenza A; influenza  A/H1; influenza A/H3; influenza A/H1-2009; influenza B; parainfluenza  viruses 1, 2, 3, 4; respiratory syncytial virus; Mycoplasma pneumoniae;  and Chlamydophila pneumoniae.    Adenovirus (RapRVP): NotDetec    Influenza A (RapRVP): NotDetec    Influenza AH1 2009 (RapRVP): NotDetec    Influenza AH1 (RapRVP): NotDetec    Influenza AH3 (RapRVP): NotDetec    Influenza B (RapRVP): NotDetec    Parainfluenza 1 (RapRVP): NotDetec    Parainfluenza 2 (RapRVP): NotDetec    Parainfluenza 3 (RapRVP): NotDetec    Parainfluenza 4 (RapRVP): NotDetec    Chlamydia pneumoniae (RapRVP): NotDetec    Mycoplasma pneumoniae (RapRVP): NotDetec    Entero/Rhinovirus (RapRVP): NotDetec    hMPV (RapRVP): Detected    Coronavirus (229E,HKU1,NL63,OC43): NotDetec

## 2020-01-05 NOTE — PROGRESS NOTE ADULT - SUBJECTIVE AND OBJECTIVE BOX
Patient is a 89y old  Male who presents with a chief complaint of Wheezing and SOB x 4 days (05 Jan 2020 10:53)      INTERVAL History of Present Illness/OVERNIGHT EVENTS: feels better with steroids, abx    MEDICATIONS  (STANDING):  atorvastatin 10 milliGRAM(s) Oral at bedtime  dextrose 5%. 1000 milliLiter(s) (50 mL/Hr) IV Continuous <Continuous>  dextrose 50% Injectable 12.5 Gram(s) IV Push once  dextrose 50% Injectable 25 Gram(s) IV Push once  dextrose 50% Injectable 25 Gram(s) IV Push once  digoxin     Tablet 0.125 milliGRAM(s) Oral every other day  furosemide   Injectable 40 milliGRAM(s) IV Push every 12 hours  guaiFENesin  milliGRAM(s) Oral every 12 hours  insulin lispro (HumaLOG) corrective regimen sliding scale   SubCutaneous three times a day before meals  levalbuterol Inhalation 0.63 milliGRAM(s) Inhalation every 6 hours  levoFLOXacin IVPB 750 milliGRAM(s) IV Intermittent every 24 hours  levoFLOXacin IVPB      losartan 100 milliGRAM(s) Oral daily  metoprolol tartrate 100 milliGRAM(s) Oral two times a day  predniSONE   Tablet 40 milliGRAM(s) Oral daily  sodium chloride 3%  Inhalation 4 milliLiter(s) Inhalation every 6 hours  tamsulosin 0.8 milliGRAM(s) Oral at bedtime  warfarin 3 milliGRAM(s) Oral once    MEDICATIONS  (PRN):  acetaminophen   Tablet .. 650 milliGRAM(s) Oral every 4 hours PRN Mild Pain (1 - 3)  benzonatate 100 milliGRAM(s) Oral three times a day PRN Cough  dextrose 40% Gel 15 Gram(s) Oral once PRN Blood Glucose LESS THAN 70 milliGRAM(s)/deciliter  glucagon  Injectable 1 milliGRAM(s) IntraMuscular once PRN Glucose LESS THAN 70 milligrams/deciliter      Allergies    No Known Allergies    Intolerances        REVIEW OF SYSTEMS:  Negative unless otherwise specified above.    Vital Signs Last 24 Hrs  T(C): 36.6 (05 Jan 2020 12:18), Max: 36.7 (04 Jan 2020 20:41)  T(F): 97.8 (05 Jan 2020 12:18), Max: 98 (04 Jan 2020 20:41)  HR: 97 (05 Jan 2020 12:18) (64 - 102)  BP: 127/81 (05 Jan 2020 12:18) (127/77 - 144/84)  BP(mean): --  RR: 18 (05 Jan 2020 12:18) (17 - 18)  SpO2: 97% (05 Jan 2020 12:18) (97% - 100%)        PHYSICAL EXAM:  GENERAL: No apparent distress, appears stated age  HEAD:  Atraumatic, Normocephalic  EYES: Conjunctiva and sclera clear, no discharge  ENMT: Moist mucous membranes, no nasal discharge  NECK: Supple, no JVD  CHEST/LUNG: +exp wheezing, fair air movement  HEART: Regular rate and rhythm, no murmurs, rubs or gallops  ABDOMEN: Soft, Nontender, Nondistended; Bowel sounds present  EXTREMITIES:  No clubbing, cyanosis or edema  SKIN: No rash or new discoloration  NERVOUS SYSTEM:  Alert & Oriented; Bilateral Lower extremity mobile, sensation to light touch intact      LABS:                        10.3   9.86  )-----------( 140      ( 05 Jan 2020 08:56 )             31.1     05 Jan 2020 06:22    139    |  98     |  26     ----------------------------<  104    3.8     |  26     |  1.05     Ca    9.0        05 Jan 2020 06:22    TPro  6.0    /  Alb  3.2    /  TBili  0.6    /  DBili  x      /  AST  14     /  ALT  16     /  AlkPhos  128    05 Jan 2020 06:22    PT/INR - ( 05 Jan 2020 08:45 )   PT: 31.8 sec;   INR: 2.68 ratio             CAPILLARY BLOOD GLUCOSE      POCT Blood Glucose.: 189 mg/dL (05 Jan 2020 11:46)  POCT Blood Glucose.: 117 mg/dL (05 Jan 2020 07:37)  POCT Blood Glucose.: 206 mg/dL (04 Jan 2020 16:26)      RADIOLOGY & ADDITIONAL TESTS:    Images reviewed personally    Consultant Notes Reviewed and Care Discussed with relevant Consultants/Other Providers.

## 2020-01-05 NOTE — PROGRESS NOTE ADULT - PROBLEM SELECTOR PLAN 7
dvt ppx: coumadin  PT rec home PT  possible discharge early next week if continued clinical improvement

## 2020-01-05 NOTE — PROGRESS NOTE ADULT - ASSESSMENT
Mr. Johnson is a 89 year old man with a history of CAD s/p PCI to the mid LAD with a BMS in 1994, double vessel CABG and mitral valve repair February 8th, 2013, paroxysmal atrial fibrillation/flutter with TBS s/p [facility] dual chamber PPM, on Coumadin for stroke risk reduction, hypertension and hyperlipidemia, mild LV dysfunction.  His most recent echo in our office demonstrated moderate to severe MR and moderate to severe TR with moderate pulmonary hypertension as of 2/2019. His EF was around 50%.    - he is demonstrating wheezing and shortness of breath, and there seems to be a viral component to this.  He is a bit improved fro yesterday.  - Started on ABx per pulmonary.    - he has mild overload on exam, with small shirley effusions on cxr. He does have severe mr/tr and elevated pulmonary pressures. His BNP is elevated and has trended up.  WE performed a non contrast CT of the chest and findings consistent with pulmonary edema.  Need to maintain a net negative fluid balance with IV Lasix.    - Monitor I, O, K and creatinine  - Steroids and nebulizers   - Needs continued support with supplemental oxygen  - Chest PT  - replete K to >4, Mg>2    - af rates are better controlled today.  Continue metoprolol 100 bid and digoxin.    - continue coumadin for goal inr 2-3.  - continue with losartan  - no sign of acute ischemia. his hs troponins are negative.   - watch creatinine and electrolytes. Keep K>4, Mg>2  - will follow with you.

## 2020-01-06 NOTE — PROGRESS NOTE ADULT - PROBLEM SELECTOR PLAN 2
small b/l pleural effusions, LE edema, sob, still overloaded  -c/w IV lasix 40mg IV q12  -TTE 2/2019 with EF 50%, moderate to severe MR and moderate to severe TR with moderate pulmonary HTN  -monitor strict I/O, daily weight  -monitor cr/lytes, replete prn

## 2020-01-06 NOTE — PROGRESS NOTE ADULT - ASSESSMENT
89M PMH Afib (coumadin), PPM, mod pHTN, mitral regurgitation, MVR, BPH presents with dyspnea and wheezing found to have bilateral bibasilar GGO on CT and is RVP+ for hMPV.     1. Human MPV pneumonia  -Wheezing, SOB- improving with steroids and diuresis  - Would continue with Levaquin 750 IV QD x 5 days   - c/w Steroids Prednisone 40 mg QD  - Bronchodilators - Xopenex Q 4 hours followed by hypertonic inhaled saline and Acapella device to aid in airway clearance.   - Supplemental O2, goal sats 92-96%  - Incentive spirometer, out of bed as tolerated.    2. HFpEF, Afib with RVR  - Cardiac optimization as per cardiology  - c/w diuresis and strict ins/outs, goal net negative daily    3. DVT prophylaxis, out of bed to chair    Will follow up 89M PMH Afib (coumadin), PPM, mod pHTN, mitral regurgitation, MVR, BPH presents with dyspnea and wheezing found to have bilateral bibasilar GGO on CT and is RVP+ for hMPV.   Clinically much improved over the past 24 hours    1. Human MPV pneumonia  -Wheezing, SOB- improving with steroids and diuresis  - Would continue with Levaquin 750 IV QD x 5 days   - c/w Steroids Prednisone 40 mg QD x 5 days  - Bronchodilators - Xopenex Q 4 hours followed by hypertonic inhaled saline and Acapella device to aid in airway clearance.   - Supplemental O2, goal sats 92-96%  - Incentive spirometer, out of bed as tolerated.    2. HFpEF, Afib with RVR  - Cardiac optimization as per cardiology  - c/w diuresis and strict ins/outs, goal net negative daily    3. DVT prophylaxis, out of bed to chair    Will follow up

## 2020-01-06 NOTE — DIETITIAN INITIAL EVALUATION ADULT. - PHYSICAL APPEARANCE
well nourished/other (specify)/No visual signs of fat/muscle loss noted Ht: 64 inches (162.56 cm) Wt: 145.2 pounds (65.9 kg) - lowest wt 1/5 standing wt  BMI: 24.9 kg/m2 - based on lowest wt 1/5 standing wt  IBW: 130 pounds +/-10% %IBW: 112% - based on lowest wt 1/5 standing wt  Skin: no pressure injuries per flowsheets   Edema: +1 edema to b/l ankles, b/l legs as per flowsheets

## 2020-01-06 NOTE — PROGRESS NOTE ADULT - SUBJECTIVE AND OBJECTIVE BOX
Patient is a 89y old  Male who presents with a chief complaint of Wheezing and SOB x 4 days (06 Jan 2020 10:13)      SUBJECTIVE / OVERNIGHT EVENTS: No overnight events. Reports sob improving but still wheezing. Denies cp, f/c,     Tele reviewed: vpaced and Afib , at times to 150      ADDITIONAL REVIEW OF SYSTEMS: Negative except for above    MEDICATIONS  (STANDING):  atorvastatin 10 milliGRAM(s) Oral at bedtime  dextrose 5%. 1000 milliLiter(s) (50 mL/Hr) IV Continuous <Continuous>  dextrose 50% Injectable 12.5 Gram(s) IV Push once  dextrose 50% Injectable 25 Gram(s) IV Push once  dextrose 50% Injectable 25 Gram(s) IV Push once  digoxin     Tablet 0.125 milliGRAM(s) Oral every other day  furosemide   Injectable 40 milliGRAM(s) IV Push every 12 hours  guaiFENesin  milliGRAM(s) Oral every 12 hours  insulin lispro (HumaLOG) corrective regimen sliding scale   SubCutaneous three times a day before meals  levalbuterol Inhalation 0.63 milliGRAM(s) Inhalation every 6 hours  levoFLOXacin IVPB 750 milliGRAM(s) IV Intermittent every 24 hours  levoFLOXacin IVPB      losartan 100 milliGRAM(s) Oral daily  metoprolol tartrate 100 milliGRAM(s) Oral two times a day  sodium chloride 3%  Inhalation 4 milliLiter(s) Inhalation every 6 hours  tamsulosin 0.8 milliGRAM(s) Oral at bedtime  warfarin 4 milliGRAM(s) Oral once    MEDICATIONS  (PRN):  acetaminophen   Tablet .. 650 milliGRAM(s) Oral every 4 hours PRN Mild Pain (1 - 3)  benzonatate 100 milliGRAM(s) Oral three times a day PRN Cough  dextrose 40% Gel 15 Gram(s) Oral once PRN Blood Glucose LESS THAN 70 milliGRAM(s)/deciliter  glucagon  Injectable 1 milliGRAM(s) IntraMuscular once PRN Glucose LESS THAN 70 milligrams/deciliter      CAPILLARY BLOOD GLUCOSE      POCT Blood Glucose.: 196 mg/dL (06 Jan 2020 12:18)  POCT Blood Glucose.: 126 mg/dL (06 Jan 2020 07:55)  POCT Blood Glucose.: 92 mg/dL (05 Jan 2020 21:49)  POCT Blood Glucose.: 194 mg/dL (05 Jan 2020 16:56)    I&O's Summary    05 Jan 2020 07:01  -  06 Jan 2020 07:00  --------------------------------------------------------  IN: 780 mL / OUT: 2630 mL / NET: -1850 mL    06 Jan 2020 07:01  -  06 Jan 2020 12:42  --------------------------------------------------------  IN: 0 mL / OUT: 1480 mL / NET: -1480 mL        PHYSICAL EXAM:  Vital Signs Last 24 Hrs  T(C): 36.3 (06 Jan 2020 05:22), Max: 37.1 (05 Jan 2020 17:04)  T(F): 97.3 (06 Jan 2020 05:22), Max: 98.8 (05 Jan 2020 17:04)  HR: 66 (06 Jan 2020 05:22) (60 - 105)  BP: 130/85 (06 Jan 2020 05:22) (112/63 - 149/78)  BP(mean): --  RR: 18 (06 Jan 2020 05:22) (18 - 18)  SpO2: 97% (06 Jan 2020 05:22) (96% - 98%)    PHYSICAL EXAM:  GENERAL: NAD, well-developed in chair on 2L NC, not tachypneic   HEAD:  Atraumatic, Normocephalic  NECK: Supple, No JVD  CHEST/LUNG: diffuse wheezing and rhonci b/l   HEART: IRRegular rhythm; +systolic murmur  ABDOMEN: Soft, Nontender, Nondistended; Bowel sounds present  EXTREMITIES:  2+ Peripheral Pulses, 1+ pitting ankle edema b/l  PSYCH: AAOx3  NEUROLOGY: non-focal      LABS:                        10.3   9.86  )-----------( 140      ( 05 Jan 2020 08:56 )             31.1     01-05    139  |  98  |  26<H>  ----------------------------<  104<H>  3.8   |  26  |  1.05    Ca    9.0      05 Jan 2020 06:22    TPro  6.0  /  Alb  3.2<L>  /  TBili  0.6  /  DBili  x   /  AST  14  /  ALT  16  /  AlkPhos  128<H>  01-05    PT/INR - ( 06 Jan 2020 08:10 )   PT: 27.3 sec;   INR: 2.33 ratio         PTT - ( 06 Jan 2020 08:10 )  PTT:34.8 sec          Culture - Blood (collected 04 Jan 2020 22:45)  Source: .Blood Blood-Peripheral  Preliminary Report (05 Jan 2020 23:01):    No growth to date.    Culture - Blood (collected 04 Jan 2020 22:45)  Source: .Blood Blood-Peripheral  Preliminary Report (05 Jan 2020 23:01):    No growth to date.        RADIOLOGY & ADDITIONAL TESTS:    Imaging Personally Reviewed:    Electrocardiogram Personally Reviewed:    COORDINATION OF CARE:  Care Discussed with Consultants/Other Providers [Y/N]:  Prior or Outpatient Records Reviewed [Y/N]:

## 2020-01-06 NOTE — PROGRESS NOTE ADULT - SUBJECTIVE AND OBJECTIVE BOX
[facility] - Preet Glover, Tere, Andreea, Anh, Juan Carlos Garza  Office Number:  [phone]    Patient resting comfortably in bed in NAD. says his breathing is better finally.  in isolation for rsv  no chest pain or palpitations, with improvement in his wheezing.    ROS: negative unless otherwise mentioned.    Telemetry: af  up to 150 though not sustained.     MEDICATIONS  (STANDING):  atorvastatin 10 milliGRAM(s) Oral at bedtime  dextrose 5%. 1000 milliLiter(s) (50 mL/Hr) IV Continuous <Continuous>  dextrose 50% Injectable 12.5 Gram(s) IV Push once  dextrose 50% Injectable 25 Gram(s) IV Push once  dextrose 50% Injectable 25 Gram(s) IV Push once  digoxin     Tablet 0.125 milliGRAM(s) Oral every other day  furosemide   Injectable 40 milliGRAM(s) IV Push every 12 hours  guaiFENesin  milliGRAM(s) Oral every 12 hours  insulin lispro (HumaLOG) corrective regimen sliding scale   SubCutaneous three times a day before meals  levalbuterol Inhalation 0.63 milliGRAM(s) Inhalation every 6 hours  levoFLOXacin IVPB 750 milliGRAM(s) IV Intermittent every 24 hours  levoFLOXacin IVPB      losartan 100 milliGRAM(s) Oral daily  metoprolol tartrate 100 milliGRAM(s) Oral two times a day  sodium chloride 3%  Inhalation 4 milliLiter(s) Inhalation every 6 hours  tamsulosin 0.8 milliGRAM(s) Oral at bedtime    MEDICATIONS  (PRN):  acetaminophen   Tablet .. 650 milliGRAM(s) Oral every 4 hours PRN Mild Pain (1 - 3)  benzonatate 100 milliGRAM(s) Oral three times a day PRN Cough  dextrose 40% Gel 15 Gram(s) Oral once PRN Blood Glucose LESS THAN 70 milliGRAM(s)/deciliter  glucagon  Injectable 1 milliGRAM(s) IntraMuscular once PRN Glucose LESS THAN 70 milligrams/deciliter      Allergies    No Known Allergies    Intolerances        Vital Signs Last 24 Hrs  T(C): 36.3 (06 Jan 2020 05:22), Max: 37.1 (05 Jan 2020 17:04)  T(F): 97.3 (06 Jan 2020 05:22), Max: 98.8 (05 Jan 2020 17:04)  HR: 66 (06 Jan 2020 05:22) (60 - 105)  BP: 130/85 (06 Jan 2020 05:22) (112/63 - 149/78)  BP(mean): --  RR: 18 (06 Jan 2020 05:22) (18 - 18)  SpO2: 97% (06 Jan 2020 05:22) (96% - 98%)    I&O's Summary    05 Jan 2020 07:01  -  06 Jan 2020 07:00  --------------------------------------------------------  IN: 780 mL / OUT: 2630 mL / NET: -1850 mL    06 Jan 2020 07:01  -  06 Jan 2020 08:48  --------------------------------------------------------  IN: 0 mL / OUT: 1000 mL / NET: -1000 mL        ON EXAM:    General: NAD, breathing is comfortable  HEENT: Mucous membranes are moist, anicteric  Lungs:  b/l wheezing and rhonchi, though overall improved  Cardiovascular: Irregular, S1 and S2, tachycardic  Gastrointestinal: Bowel Sounds present, soft, nontender.   Lymph: Minimal peripheral edema. No lymphadenopathy.  Skin: No rashes or ulcers  Psych:  Mood & affect appropriate    LABS: All Labs Reviewed:                        10.3   9.86  )-----------( 140      ( 05 Jan 2020 08:56 )             31.1                         10.6   20.64 )-----------( 160      ( 04 Jan 2020 09:57 )             33.1     05 Jan 2020 06:22    139    |  98     |  26     ----------------------------<  104    3.8     |  26     |  1.05   04 Jan 2020 05:56    133    |  95     |  25     ----------------------------<  144    3.4     |  24     |  0.93     Ca    9.0        05 Jan 2020 06:22  Ca    9.1        04 Jan 2020 05:56    TPro  6.0    /  Alb  3.2    /  TBili  0.6    /  DBili  x      /  AST  14     /  ALT  16     /  AlkPhos  128    05 Jan 2020 06:22    PT/INR - ( 06 Jan 2020 08:10 )   PT: 27.3 sec;   INR: 2.33 ratio         PTT - ( 06 Jan 2020 08:10 )  PTT:34.8 sec      Blood Culture: Organism --  Gram Stain Blood -- Gram Stain --  Specimen Source .Blood Blood-Peripheral  Culture-Blood --

## 2020-01-06 NOTE — DIETITIAN INITIAL EVALUATION ADULT. - ADD RECOMMEND
1) Continue current diet: DASH/TLC, monitor/adjust as needed 2) Encouraged PO intake as tolerated with emphasis on protein, reviewed DASH/TLC diet education, pt made aware RD remains available 3) Monitor PO intake, weight, labs, skin, GI status, diet

## 2020-01-06 NOTE — PROGRESS NOTE ADULT - PROBLEM SELECTOR PLAN 3
c/w lopressor 100mg bid, digoxin 0.125mg every other day  HR may be elevated at times due to nebs  INR 2.3, dose coumadin 4mg tonight, monitor INR daily

## 2020-01-06 NOTE — DIETITIAN INITIAL EVALUATION ADULT. - REASON INDICATOR FOR ASSESSMENT
Pt seen for length of stay assessment. Source: chart review, patient. 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 Pt found to have bilateral opacities on CT and is RVP+ for PNA per chart. Per chart, likely also with acute on chronic diastolic HF exacerbation.

## 2020-01-06 NOTE — PROGRESS NOTE ADULT - ASSESSMENT
Mr. Johnson is a 89 year old man with a history of CAD s/p PCI to the mid LAD with a BMS in 1994, double vessel CABG and mitral valve repair February 8th, 2013, paroxysmal atrial fibrillation/flutter with TBS s/p [facility] dual chamber PPM, on Coumadin for stroke risk reduction, hypertension and hyperlipidemia, mild LV dysfunction.  His most recent echo in our office demonstrated moderate to severe MR and moderate to severe TR with moderate pulmonary hypertension as of 2/2019. His EF was around 50%.  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    - he is demonstrating wheezing and shortness of breath, and there is a viral component to this.  He is a bit improved from yesterday.  - Started on ABx per pulmonary.    - he has mild overload on exam, with small shirley effusions on cxr. He does have severe mr/tr and elevated pulmonary pressures. His BNP is elevated and has trended up.  We performed a non contrast CT of the chest and findings consistent with pulmonary edema.  Need to maintain a net negative fluid balance with IV Lasix. If his creatinine worsens, would decrease to daily dosing.  - Monitor I, O, K and creatinine  - Steroids and nebulizers   - Needs continued support with supplemental oxygen and Chest PT  - replete K to >4, Mg>2    - af rates are ok, though with bouts up to the 150's. Continue metoprolol 100 bid and digoxin. His faster heart rates seem to be reactive.  - continue coumadin for goal inr 2-3.  - continue with losartan  - no sign of acute ischemia. his hs troponins are negative.   - watch creatinine and electrolytes. Keep K>4, Mg>2  - will follow with you.

## 2020-01-06 NOTE — PROGRESS NOTE ADULT - SUBJECTIVE AND OBJECTIVE BOX
[facility] - Division of Pulmonary, Critical Care and Sleep Medicine   Please call [phone] between 8am-pm weekdays, [phone] after hours and weekends    Interval Events: No events - still wheezing but feeling better, afebrile.    REVIEW OF SYSTEMS:  CV: [ ] chest pain   Resp: [ ] cough [ ] shortness of breath   [ ] All other systems negative  [ ] Unable to assess ROS because ________    OBJECTIVE:  ICU Vital Signs Last 24 Hrs  T(C): 36.3 (06 Jan 2020 05:22), Max: 37.1 (05 Jan 2020 17:04)  T(F): 97.3 (06 Jan 2020 05:22), Max: 98.8 (05 Jan 2020 17:04)  HR: 66 (06 Jan 2020 05:22) (60 - 105)  BP: 130/85 (06 Jan 2020 05:22) (112/63 - 149/78)  BP(mean): --  ABP: --  ABP(mean): --  RR: 18 (06 Jan 2020 05:22) (18 - 18)  SpO2: 97% (06 Jan 2020 05:22) (96% - 98%)        01-05 @ 07:01 - 01-06 @ 07:00  --------------------------------------------------------  IN: 780 mL / OUT: 2630 mL / NET: -1850 mL    01-06 @ 07:01 - 01-06 @ 10:13  --------------------------------------------------------  IN: 0 mL / OUT: 1480 mL / NET: -1480 mL      CAPILLARY BLOOD GLUCOSE      POCT Blood Glucose.: 126 mg/dL (06 Jan 2020 07:55)      PHYSICAL EXAM:  General: NAD  HEENT: NC/AT  Lymph Nodes: no cervical or supraclavicular lymphadenopathy  Neck: supple  Respiratory:  CTA b/l, no wheezes, crackles or rhonchi  Cardiovascular:  RRR, no m/r/g  Abdomen: soft, NT/ND, +BS  Extremities: no clubbing, cyanosis or edema, warm  Skin: no rash  Neurological: AAOx3, non focal exam  Psychiatry: not anxious appearing, normal affect and mood    HOSPITAL MEDICATIONS:  MEDICATIONS  (STANDING):  atorvastatin 10 milliGRAM(s) Oral at bedtime  dextrose 5%. 1000 milliLiter(s) (50 mL/Hr) IV Continuous <Continuous>  dextrose 50% Injectable 12.5 Gram(s) IV Push once  dextrose 50% Injectable 25 Gram(s) IV Push once  dextrose 50% Injectable 25 Gram(s) IV Push once  digoxin     Tablet 0.125 milliGRAM(s) Oral every other day  furosemide   Injectable 40 milliGRAM(s) IV Push every 12 hours  guaiFENesin  milliGRAM(s) Oral every 12 hours  insulin lispro (HumaLOG) corrective regimen sliding scale   SubCutaneous three times a day before meals  levalbuterol Inhalation 0.63 milliGRAM(s) Inhalation every 6 hours  levoFLOXacin IVPB 750 milliGRAM(s) IV Intermittent every 24 hours  levoFLOXacin IVPB      losartan 100 milliGRAM(s) Oral daily  metoprolol tartrate 100 milliGRAM(s) Oral two times a day  sodium chloride 3%  Inhalation 4 milliLiter(s) Inhalation every 6 hours  tamsulosin 0.8 milliGRAM(s) Oral at bedtime    MEDICATIONS  (PRN):  acetaminophen   Tablet .. 650 milliGRAM(s) Oral every 4 hours PRN Mild Pain (1 - 3)  benzonatate 100 milliGRAM(s) Oral three times a day PRN Cough  dextrose 40% Gel 15 Gram(s) Oral once PRN Blood Glucose LESS THAN 70 milliGRAM(s)/deciliter  glucagon  Injectable 1 milliGRAM(s) IntraMuscular once PRN Glucose LESS THAN 70 milligrams/deciliter      LABS:                        10.3   9.86  )-----------( 140      ( 05 Jan 2020 08:56 )             31.1     Hgb Trend: 10.3<--, 10.6<--, 10.8<--, 10.2<--, 9.9<--  01-05    139  |  98  |  26<H>  ----------------------------<  104<H>  3.8   |  26  |  1.05    Ca    9.0      05 Jan 2020 06:22    TPro  6.0  /  Alb  3.2<L>  /  TBili  0.6  /  DBili  x   /  AST  14  /  ALT  16  /  AlkPhos  128<H>  01-05    Creatinine Trend: 1.05<--, 0.93<--, 0.87<--, 0.80<--, 0.93<--, 0.92<--  PT/INR - ( 06 Jan 2020 08:10 )   PT: 27.3 sec;   INR: 2.33 ratio         PTT - ( 06 Jan 2020 08:10 )  PTT:34.8 sec          MICROBIOLOGY:   Legionella pneumophila Antigen, Urine (01.04.20 @ 22:39)    Legionella Antigen, Urine: Negative    Culture - Blood (01.04.20 @ 22:45)    Specimen Source: .Blood Blood-Peripheral    Culture Results:   No growth to date.      RADIOLOGY:  [ ] Reviewed and interpreted by me [facility] - Division of Pulmonary, Critical Care and Sleep Medicine   Please call [phone] between 8am-pm weekdays, [phone] after hours and weekends    Interval Events: No events - coughing but overall feeling much better, afebrile. Wheezing improved    REVIEW OF SYSTEMS:  CV: [- ] chest pain   Resp: [+ ] cough [- ] shortness of breath   [x ] All other systems negative  [ ] Unable to assess ROS because ________    OBJECTIVE:  ICU Vital Signs Last 24 Hrs  T(C): 36.3 (06 Jan 2020 05:22), Max: 37.1 (05 Jan 2020 17:04)  T(F): 97.3 (06 Jan 2020 05:22), Max: 98.8 (05 Jan 2020 17:04)  HR: 66 (06 Jan 2020 05:22) (60 - 105)  BP: 130/85 (06 Jan 2020 05:22) (112/63 - 149/78)  BP(mean): --  ABP: --  ABP(mean): --  RR: 18 (06 Jan 2020 05:22) (18 - 18)  SpO2: 97% (06 Jan 2020 05:22) (96% - 98%)        01-05 @ 07:01 - 01-06 @ 07:00  --------------------------------------------------------  IN: 780 mL / OUT: 2630 mL / NET: -1850 mL    01-06 @ 07:01 - 01-06 @ 10:13  --------------------------------------------------------  IN: 0 mL / OUT: 1480 mL / NET: -1480 mL      CAPILLARY BLOOD GLUCOSE      POCT Blood Glucose.: 126 mg/dL (06 Jan 2020 07:55)      PHYSICAL EXAM:  General: NAD  HEENT: NC/AT  Neck: supple  Respiratory:  scattered rhonchi and wheezes, no crackles  Cardiovascular:  RRR, no m/r/g  Abdomen: soft, NT/ND, +BS  Extremities: no clubbing, cyanosis or edema, warm  Skin: no rash  Neurological: AAOx3, non focal exam  Psychiatry: not anxious appearing, normal affect and mood    HOSPITAL MEDICATIONS:  MEDICATIONS  (STANDING):  atorvastatin 10 milliGRAM(s) Oral at bedtime  dextrose 5%. 1000 milliLiter(s) (50 mL/Hr) IV Continuous <Continuous>  dextrose 50% Injectable 12.5 Gram(s) IV Push once  dextrose 50% Injectable 25 Gram(s) IV Push once  dextrose 50% Injectable 25 Gram(s) IV Push once  digoxin     Tablet 0.125 milliGRAM(s) Oral every other day  furosemide   Injectable 40 milliGRAM(s) IV Push every 12 hours  guaiFENesin  milliGRAM(s) Oral every 12 hours  insulin lispro (HumaLOG) corrective regimen sliding scale   SubCutaneous three times a day before meals  levalbuterol Inhalation 0.63 milliGRAM(s) Inhalation every 6 hours  levoFLOXacin IVPB 750 milliGRAM(s) IV Intermittent every 24 hours  levoFLOXacin IVPB      losartan 100 milliGRAM(s) Oral daily  metoprolol tartrate 100 milliGRAM(s) Oral two times a day  sodium chloride 3%  Inhalation 4 milliLiter(s) Inhalation every 6 hours  tamsulosin 0.8 milliGRAM(s) Oral at bedtime    MEDICATIONS  (PRN):  acetaminophen   Tablet .. 650 milliGRAM(s) Oral every 4 hours PRN Mild Pain (1 - 3)  benzonatate 100 milliGRAM(s) Oral three times a day PRN Cough  dextrose 40% Gel 15 Gram(s) Oral once PRN Blood Glucose LESS THAN 70 milliGRAM(s)/deciliter  glucagon  Injectable 1 milliGRAM(s) IntraMuscular once PRN Glucose LESS THAN 70 milligrams/deciliter      LABS:                        10.3   9.86  )-----------( 140      ( 05 Jan 2020 08:56 )             31.1     Hgb Trend: 10.3<--, 10.6<--, 10.8<--, 10.2<--, 9.9<--  01-05    139  |  98  |  26<H>  ----------------------------<  104<H>  3.8   |  26  |  1.05    Ca    9.0      05 Jan 2020 06:22    TPro  6.0  /  Alb  3.2<L>  /  TBili  0.6  /  DBili  x   /  AST  14  /  ALT  16  /  AlkPhos  128<H>  01-05    Creatinine Trend: 1.05<--, 0.93<--, 0.87<--, 0.80<--, 0.93<--, 0.92<--  PT/INR - ( 06 Jan 2020 08:10 )   PT: 27.3 sec;   INR: 2.33 ratio         PTT - ( 06 Jan 2020 08:10 )  PTT:34.8 sec          MICROBIOLOGY:   Legionella pneumophila Antigen, Urine (01.04.20 @ 22:39)    Legionella Antigen, Urine: Negative    Culture - Blood (01.04.20 @ 22:45)    Specimen Source: .Blood Blood-Peripheral    Culture Results:   No growth to date.      RADIOLOGY:  [x ] Reviewed and interpreted by me  < from: CT Chest No Cont (01.03.20 @ 14:18) >  EXAM:  CT CHEST                            PROCEDURE DATE:  01/03/2020            INTERPRETATION:  CLINICAL INFORMATION: Shortness of breath and coughing    COMPARISON: CT chest 3/9/2017.    PROCEDURE:   CT of the Chest was performed without intravenous contrast.  Sagittal and coronal reformats were performed.    FINDINGS:    No axillary adenopathy. Small mediastinal and hilar lymph nodes. The thyroid is normal.    Calcifications of the thoracic aorta, which is normal in size and contour. The heart is enlarged. Coronary artery calcifications. Mitral annuloplasty. Left-sided dual-lead cardiac pacer.    No endobronchial lesion. Mosaic attenuation of the lungs. Small bilateral pleural effusions, right greater than left, with adjacent compressive atelectasis.     Nodular and groundglass opacities in the bilateral lower lobes, right greater than left. A few subpleural and perifissural opacities, likely lymph nodes. The lungs are otherwise clear. No pneumothorax.    Below the diaphragm, thereis redemonstration of a dilated main pancreatic duct and slight atrophy of the distal body and tail of the pancreas. Right renal cyst.    Evaluation of the osseous structures demonstrate a sternotomy and degenerative changes of the spine. There is a left cervical rib.    IMPRESSION:     1.  Nodular and groundglass opacities in the bilateral lower lobes, right greater than left, which can be seen in the setting of infection or alternatively pulmonary edema.  2.  Small bilateral pleural effusions with adjacent compressive atelectasis.  3.  No change in the pancreatic ductal dilatation or atrophy of the distal body and tail of the pancreas.      TITI PINEDA M.D., RADIOLOGY RESIDENT  This document has been electronically signed.  SKYLA FRENCH M.D., ATTENDING RADIOLOGIST  This document has been electronically signed. In[place]encio  3 2020  4:40PM    < end of copied text >

## 2020-01-06 NOTE — PROGRESS NOTE ADULT - PROBLEM SELECTOR PLAN 1
RVP+ with acute bronchitis and possible PNA given GGO on CT  -not sob but still wheezing, per pulm increase levalbuterol q4 ATC and Nacl inhaler q4  -c/w levaquin 750mg q48 (cr 39), next dose 1/7 x 5 day course  -c/w prednisone 40mg daily x 5 day course  -c/w mucinex 600mg bid  -c/w 02 prn, wean as tolerate

## 2020-01-06 NOTE — DIETITIAN INITIAL EVALUATION ADULT. - OTHER INFO
Pt reports good appetite and fair-good intake, consuming >50% of meal trays, consumed eggs and cereal for breakfast this morning. Pt denies nausea/vomiting/diarrhea/constipation, last BM yesterday (1/5) as per patient. Pt endorses difficulties chewing certain foods (meats, some hard foods) due to ill-fitting dentures, no difficulties swallowing reported. Pt denies need for Soft diet at this time, states "I can order softer foods," and would not like limiting of options. Confirmed NKFA.     Pt reports good appetite and intake PTA, consuming 3 meals/day, breakfast and lunch provided by "center" per pt. Diet recall includes eggs and cold cereal for breakfast, "a meal of whatever is served by the center" for lunch, and dinner consists of mostly "store-bought meals." Pt endorses disliking meat and generally eating more fish and chicken. Pt states he looks at nutrition labels and tries to choose packaged items with lower sodium content. Pt denies oral nutrition supplementation PTA. Per H&P, pt supplementing with ferrous sulfate and Vitamin D3 PTA. Pt endorses recent weight gain - unsure of quantity, attributes to fluid retention. States -151 pounds (consistent with recent weights in chart 145.2-152.3 pounds).     Encouraged PO intake with emphasis on lean protein (poultry, fish, eggs). Discussed provision of supplemental calories, protein, and nutrients through oral nutrition supplement, pt not amenable to any nutritional supplementation at this time. Encouraged pt to continue to order soft foods and made pt aware RD remains available, diet can be changed if needed. Reviewed Heart Healthy, Low Sodium diet. Encouraged continued avoidance of red and processed meats high in saturated fats. Discussed low sodium nutrition therapy, and encouraged continued assessment of nutrition labels to choose lower-sodium options of store-bought foods (ie canned soups). Pt amenable to recommendations and made aware RD remains available.

## 2020-01-07 NOTE — PROGRESS NOTE ADULT - ASSESSMENT
Mr. Johnson is a 89 year old man with a history of CAD s/p PCI to the mid LAD with a BMS in 1994, double vessel CABG and mitral valve repair February 8th, 2013, paroxysmal atrial fibrillation/flutter with TBS s/p [place] dual chamber PPM, on Coumadin for stroke risk reduction, hypertension and hyperlipidemia, mild LV dysfunction.  His most recent echo in our office demonstrated moderate to severe MR and moderate to severe TR with moderate pulmonary hypertension as of 2/2019. His EF was around 50%.  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    - he is demonstrating wheezing and shortness of breath, and there is a viral component to this.  He is a bit improved from yesterday.  - Started on ABx per pulmonary.    - he has mild overload on exam, with small shirley effusions on cxr. He does have severe mr/tr and elevated pulmonary pressures. His BNP is elevated and has trended up.  We performed a non contrast CT of the chest and findings consistent with pulmonary edema.  Need to maintain a net negative fluid balance with IV Lasix. If his creatinine worsens, would decrease to daily dosing.  - Monitor I, O, K and creatinine  - Steroids and nebulizers   - Needs continued support with supplemental oxygen and Chest PT  - replete K to >4, Mg>2    - af rates are ok, Continue metoprolol 100 bid and digoxin. His faster heart rates seem to be reactive.  - continue coumadin for goal inr 2-3.  - continue with losartan  - no sign of acute ischemia. his hs troponins are negative.   - watch creatinine and electrolytes. Keep K>4, Mg>2  - will follow with you.

## 2020-01-07 NOTE — PROGRESS NOTE ADULT - PROBLEM SELECTOR PLAN 1
RVP+ with acute bronchitis and possible PNA given GGO on CT  -not sob, +wheezing/cough improving,    -c/w levalbuterol and hypertonic saline nebs q4 ATC   -c/w levaquin 750mg q48 (cr 39), last dose today day 4/5  -discussed with pulm Dr Barrera, c/w prednisone 40mg daily x 3 more days  -c/w mucinex 600mg bid  -02 sat upper 90s at rest, will get exertional 02 sat today  -wean 02 as tolerated, monitor 02 sat

## 2020-01-07 NOTE — PROGRESS NOTE ADULT - PROBLEM SELECTOR PLAN 3
c/w lopressor 100mg bid, digoxin 0.125mg every other day  HR may be elevated at times due to nebs  INR 2.0, dose coumadin 4mg tonight, monitor INR daily

## 2020-01-07 NOTE — PROGRESS NOTE ADULT - SUBJECTIVE AND OBJECTIVE BOX
[facility] - Preet Glover, Tere, Andreea, Anh, Juan Carlos Garza  Office Number:  [phone]    Patient resting comfortably in bed in NAD.  Reports SOB better.  HE is still wheezing.      F/U for:  AF    Telemetry: Rate controlled AF     MEDICATIONS  (STANDING):  atorvastatin 10 milliGRAM(s) Oral at bedtime  dextrose 5%. 1000 milliLiter(s) (50 mL/Hr) IV Continuous <Continuous>  dextrose 50% Injectable 12.5 Gram(s) IV Push once  dextrose 50% Injectable 25 Gram(s) IV Push once  dextrose 50% Injectable 25 Gram(s) IV Push once  digoxin     Tablet 0.125 milliGRAM(s) Oral every other day  furosemide   Injectable 40 milliGRAM(s) IV Push every 12 hours  guaiFENesin  milliGRAM(s) Oral every 12 hours  insulin lispro (HumaLOG) corrective regimen sliding scale   SubCutaneous three times a day before meals  levalbuterol Inhalation 0.63 milliGRAM(s) Inhalation every 4 hours  levoFLOXacin  Tablet 750 milliGRAM(s) Oral once  losartan 100 milliGRAM(s) Oral daily  metoprolol tartrate 100 milliGRAM(s) Oral two times a day  potassium chloride    Tablet ER 40 milliEquivalent(s) Oral once  predniSONE   Tablet 40 milliGRAM(s) Oral daily  sodium chloride 3%  Inhalation 4 milliLiter(s) Inhalation every 4 hours  tamsulosin 0.8 milliGRAM(s) Oral at bedtime    MEDICATIONS  (PRN):  acetaminophen   Tablet .. 650 milliGRAM(s) Oral every 4 hours PRN Mild Pain (1 - 3)  benzonatate 100 milliGRAM(s) Oral three times a day PRN Cough  dextrose 40% Gel 15 Gram(s) Oral once PRN Blood Glucose LESS THAN 70 milliGRAM(s)/deciliter  glucagon  Injectable 1 milliGRAM(s) IntraMuscular once PRN Glucose LESS THAN 70 milligrams/deciliter      Allergies    No Known Allergies    Intolerances        Vital Signs Last 24 Hrs  T(C): 36.4 (07 Jan 2020 05:20), Max: 36.6 (06 Jan 2020 12:00)  T(F): 97.5 (07 Jan 2020 05:20), Max: 97.9 (06 Jan 2020 12:00)  HR: 90 (07 Jan 2020 05:20) (90 - 99)  BP: 131/69 (07 Jan 2020 05:20) (121/75 - 150/73)  BP(mean): --  RR: 18 (07 Jan 2020 05:20) (18 - 18)  SpO2: 99% (07 Jan 2020 05:20) (98% - 99%)    I&O's Summary    06 Jan 2020 07:01  -  07 Jan 2020 07:00  --------------------------------------------------------  IN: 300 mL / OUT: 3800 mL / NET: -3500 mL        ON EXAM:    General: NAD, breathing is comfortable  HEENT: Mucous membranes are moist, anicteric  Lungs:  b/l wheezing and rhonchi, though overall improved  Cardiovascular: Irregular, S1 and S2, tachycardic  Gastrointestinal: Bowel Sounds present, soft, nontender.   Lymph: Minimal peripheral edema. No lymphadenopathy.  Skin: No rashes or ulcers  Psych:  Mood & affect appropriate    LABS: All Labs Reviewed:                        10.3   9.86  )-----------( 140      ( 05 Jan 2020 08:56 )             31.1                         10.6   20.64 )-----------( 160      ( 04 Jan 2020 09:57 )             33.1     07 Jan 2020 06:38    138    |  95     |  25     ----------------------------<  169    3.4     |  27     |  1.02   05 Jan 2020 06:22    139    |  98     |  26     ----------------------------<  104    3.8     |  26     |  1.05     Ca    9.0        07 Jan 2020 06:38  Ca    9.0        05 Jan 2020 06:22  Mg     2.0       07 Jan 2020 06:38    TPro  6.1    /  Alb  3.3    /  TBili  0.7    /  DBili  x      /  AST  22     /  ALT  28     /  AlkPhos  132    07 Jan 2020 06:38  TPro  6.0    /  Alb  3.2    /  TBili  0.6    /  DBili  x      /  AST  14     /  ALT  16     /  AlkPhos  128    05 Jan 2020 06:22    PT/INR - ( 06 Jan 2020 08:10 )   PT: 27.3 sec;   INR: 2.33 ratio         PTT - ( 06 Jan 2020 08:10 )  PTT:34.8 sec      Blood Culture: Organism --  Gram Stain Blood -- Gram Stain --  Specimen Source .Blood Blood-Peripheral  Culture-Blood --

## 2020-01-07 NOTE — PROGRESS NOTE ADULT - PROBLEM SELECTOR PLAN 2
small b/l pleural effusions, LE edema, sob, still overloaded, good outputs  -c/w IV lasix 40mg IV q12  -TTE 2/2019 with EF 50%, moderate to severe MR and moderate to severe TR with moderate pulmonary HTN  -monitor strict I/O, daily weight  -monitor cr/lytes, replete prn

## 2020-01-07 NOTE — PROGRESS NOTE ADULT - PROBLEM SELECTOR PLAN 7
dvt ppx: coumadin  PT rec home PT  Dispo: possible discharge home to[place] if continued improvement and if 02 sats stable on exertion

## 2020-01-07 NOTE — PROGRESS NOTE ADULT - ASSESSMENT
89M PMH Afib (coumadin), PPM, mod pHTN, mitral regurgitation, MVR, BPH presents with dyspnea and wheezing found to have bilateral bibasilar GGO on CT and is RVP+ for hMPV.   Clinically improving.     1. Human MPV pneumonia  -Wheezing, SOB- improving with steroids and diuresis  - Would continue with Levaquin 750 IV QD - day 4/5, c/w Steroids Prednisone 40 mg QD x 5 days  - Bronchodilators - Xopenex Q 4 hours followed by hypertonic inhaled saline and Acapella device to aid in airway clearance.   - Supplemental O2, goal sats 92-96%- on my exam, 96-97% on RA at rest. Would ambulate and check O2 sats  - Incentive spirometer, out of bed as tolerated.    2. HFpEF, Afib with RVR  - Cardiac optimization as per cardiology  - c/w diuresis and strict ins/outs, goal net negative daily    3. DVT prophylaxis, out of bed to chair  Dispo home soon?  Will follow up    Attending Attestation:   I was physically present for the key portions of the evaluation and management (E/M) service provided.  I agree with the above history, physical, and plan which I have reviewed and edited where appropriate.     35 minutes spent on total encounter; more than 50% of the visit was spent counseling and/or coordinating care by the attending physician.     Plan discussed with patient.

## 2020-01-07 NOTE — PROGRESS NOTE ADULT - SUBJECTIVE AND OBJECTIVE BOX
[facility] - Division of Pulmonary, Critical Care and Sleep Medicine   Please call [phone] between 8am-pm weekdays, [phone] after hours and weekends    Interval Events:  Clinically improving - seen sitting up in a chair, eating lunch. Denies dyspnea. +cough and wheeze but improving. Afebrile    REVIEW OF SYSTEMS:  CV: [- ] chest pain   Resp: [+ ] cough [ -] shortness of breath   [x ] All other systems negative  [ ] Unable to assess ROS because ________    OBJECTIVE:  ICU Vital Signs Last 24 Hrs  T(C): 36.4 (07 Jan 2020 05:20), Max: 36.5 (06 Jan 2020 20:29)  T(F): 97.5 (07 Jan 2020 05:20), Max: 97.7 (06 Jan 2020 20:29)  HR: 90 (07 Jan 2020 05:20) (90 - 99)  BP: 131/69 (07 Jan 2020 05:20) (121/75 - 150/73)  BP(mean): --  ABP: --  ABP(mean): --  RR: 18 (07 Jan 2020 05:20) (18 - 18)  SpO2: 99% (07 Jan 2020 05:20) (99% - 99%)        01-06 @ 07:01 - 01-07 @ 07:00  --------------------------------------------------------  IN: 300 mL / OUT: 3800 mL / NET: -3500 mL    01-07 @ 07:01 - 01-07 @ 13:11  --------------------------------------------------------  IN: 360 mL / OUT: 300 mL / NET: 60 mL      CAPILLARY BLOOD GLUCOSE      POCT Blood Glucose.: 279 mg/dL (07 Jan 2020 12:15)      PHYSICAL EXAM:  General: NAD  HEENT: NC/AT  Neck: supple  Respiratory:  moving air throughout - course BS and scattered wheezes  Cardiovascular:  RRR, no m/r/g  Abdomen: soft, NT/ND, +BS  Extremities: no clubbing, cyanosis or edema, warm  Skin: no rash  Neurological: AAOx3, non focal exam  Psychiatry: not anxious appearing, normal affect and mood    HOSPITAL MEDICATIONS:  MEDICATIONS  (STANDING):  atorvastatin 10 milliGRAM(s) Oral at bedtime  dextrose 5%. 1000 milliLiter(s) (50 mL/Hr) IV Continuous <Continuous>  dextrose 50% Injectable 12.5 Gram(s) IV Push once  dextrose 50% Injectable 25 Gram(s) IV Push once  dextrose 50% Injectable 25 Gram(s) IV Push once  digoxin     Tablet 0.125 milliGRAM(s) Oral every other day  furosemide   Injectable 40 milliGRAM(s) IV Push every 12 hours  guaiFENesin  milliGRAM(s) Oral every 12 hours  insulin lispro (HumaLOG) corrective regimen sliding scale   SubCutaneous three times a day before meals  levalbuterol Inhalation 0.63 milliGRAM(s) Inhalation every 4 hours  levoFLOXacin  Tablet 750 milliGRAM(s) Oral once  losartan 100 milliGRAM(s) Oral daily  metoprolol tartrate 100 milliGRAM(s) Oral two times a day  predniSONE   Tablet 40 milliGRAM(s) Oral daily  sodium chloride 3%  Inhalation 4 milliLiter(s) Inhalation every 4 hours  tamsulosin 0.8 milliGRAM(s) Oral at bedtime  warfarin 4 milliGRAM(s) Oral once    MEDICATIONS  (PRN):  acetaminophen   Tablet .. 650 milliGRAM(s) Oral every 4 hours PRN Mild Pain (1 - 3)  benzonatate 100 milliGRAM(s) Oral three times a day PRN Cough  dextrose 40% Gel 15 Gram(s) Oral once PRN Blood Glucose LESS THAN 70 milliGRAM(s)/deciliter  glucagon  Injectable 1 milliGRAM(s) IntraMuscular once PRN Glucose LESS THAN 70 milligrams/deciliter      LABS:                        11.9   11.73 )-----------( 178      ( 07 Jan 2020 09:28 )             36.6     Hgb Trend: 11.9<--, 10.3<--, 10.6<--, 10.8<--, 10.2<--  01-07    138  |  95<L>  |  25<H>  ----------------------------<  169<H>  3.4<L>   |  27  |  1.02    Ca    9.0      07 Jan 2020 06:38  Mg     2.0     01-07    TPro  6.1  /  Alb  3.3  /  TBili  0.7  /  DBili  x   /  AST  22  /  ALT  28  /  AlkPhos  132<H>  01-07    Creatinine Trend: 1.02<--, 1.05<--, 0.93<--, 0.87<--, 0.80<--, 0.93<--  PT/INR - ( 07 Jan 2020 08:53 )   PT: 24.0 sec;   INR: 2.08 ratio         PTT - ( 07 Jan 2020 08:53 )  PTT:33.2 sec          MICROBIOLOGY:     RADIOLOGY:  [ x] Reviewed and interpreted by me

## 2020-01-08 NOTE — PROGRESS NOTE ADULT - PROBLEM SELECTOR PLAN 3
c/w lopressor 100mg bid, digoxin 0.125mg every other day  INR 2.3, dose coumadin 4mg tonight, monitor INR daily

## 2020-01-08 NOTE — PROGRESS NOTE ADULT - PROBLEM SELECTOR PLAN 1
RVP+ with acute bronchitis and possible PNA given GGO on CT  -not sob, +wheezing/cough improving,    -c/w levalbuterol and hypertonic saline nebs q4 ATC   -c/w levaquin 750mg q48 (cr 39) Day 5 of 5  -completed predispose course  -c/w mucinex 600mg bid  -02 sat upper 98% on RA, 90% on exertion  -will set up with nebulizer machine at home and outpatient pulm f/u with Dr Wen in 1 -2 weeks, repeat chest CT in 4-6 weeks

## 2020-01-08 NOTE — DISCHARGE NOTE PROVIDER - PROVIDER TOKENS
PROVIDER:[TOKEN:[[id number]]],PROVIDER:[TOKEN:[[id number]]],PROVIDER:[TOKEN:[[id number]]],PROVIDER:[TOKEN:[[id number]]]

## 2020-01-08 NOTE — DISCHARGE NOTE PROVIDER - CARE PROVIDER_API CALL
Cody Holland)  Urology  [address], [address]  [place] [address]  Phone: [phone]  Fax: [phone]  Follow Up Time:     Sarthak Kamara)  Internal Medicine  [address] [place] [address]  Phone: [phone]  Fax: [phone]  Follow Up Time:     Dilcia Izquierdo)  Medicine  [address] [place] [address]  Phone: [phone]  Fax: [phone]  Follow Up Time:     Yennifer Barrera)  Internal Medicine; Pulmonary Disease  [address] [place] [address]  Phone: [phone]  Fax: [phone]  Follow Up Time:

## 2020-01-08 NOTE — PROGRESS NOTE ADULT - PROBLEM SELECTOR PLAN 7
dvt ppx: coumadin  PT rec home PT  Dispo: likely discharge home torey if can switch to po diuretics a1c 6.3 on 11/2019, FS stable likely more elevated than baseline due to steroid use  -c/w SWATI  -monitor FS

## 2020-01-08 NOTE — DISCHARGE NOTE PROVIDER - NSDCCPCAREPLAN_GEN_ALL_CORE_FT
PRINCIPAL DISCHARGE DIAGNOSIS  Diagnosis: Human metapneumovirus pneumonia  Assessment and Plan of Treatment: stable. Continue with your medications as ordered.  pulmology f/u with Dr Barrera in 1 -2 weeks, repeat chest CT in 4-6 weeks.      SECONDARY DISCHARGE DIAGNOSES  Diagnosis: Bladder carcinoma  Assessment and Plan of Treatment: Follow up with Urology as an outpatient.    Diagnosis: Acute on chronic heart failure with preserved ejection fraction  Assessment and Plan of Treatment: Weigh yourself daily.  If you gain 3lbs in 3 days, or 5lbs in a week call your Health Care Provider.  Do not eat or drink foods containing more than 2000mg of salt (sodium) in your diet every day.  Call your Health Care Provider if you have any swelling or increased swelling in your feet, ankles, and/or stomach.  Take all of your medication as directed.  If you become dizzy call your Health Care Provider.      Diagnosis: Atrial fibrillation with RVR  Assessment and Plan of Treatment: Atrial fibrillation is the most common heart rhythm problem & has the risk of stroke & heart attack  It helps if you control your blood pressure, not drink more than 1-2 alcohol drinks per day, cut down on caffeine, getting treatment for over active thyroid gland, & getting exercise  Call your doctor if you feel your heart racing or beating unusually, chest tightness or pain, lightheaded, faint, shortness of breath especially with exercise  It is important to take your heart medication as prescribed  You may be on anticoagulation which is very important to take as directed - you may need blood work to monitor drug levels      Diagnosis: BPH (benign prostatic hyperplasia)  Assessment and Plan of Treatment: You have an enlarged prostate gland which gets bigger as men get older - it is a very common problem and has nothing to do with prostate cancer  Call your doctor if you are urinating more frequently, have trouble starting to urinate, have weak stream, urine leaking or dribbling, and feeling as though bladder is not empty after urination  Your doctor will monitor your prostate with a rectal exam as well as urine or blood testing  You can help yourself by reducing the amount of fluid you drink before going to bed, limiting the amount of alcohol & caffeine you drink   Avoid cold & allergy medication that contain decongestants or antihistamines which make BPH symptoms worse  You can also "double void" by waiting a moment after urinating & trying again  Take your medication as prescribed - one medication helps to relax the muscle aroung the urethra and the other medication you may take prevents the prostate from growing more or even shrinking the prostate      Diagnosis: HTN (hypertension)  Assessment and Plan of Treatment: Low salt diet  Activity as tolerated.  Take all medication as prescribed.  Follow up with your medical doctor for routine blood pressure monitoring at your next visit.  Notify your doctor if you have any of the following symptoms:   Dizziness, Lightheadedness, Blurry vision, Headache, Chest pain, Shortness of breath

## 2020-01-08 NOTE — PROGRESS NOTE ADULT - PROBLEM SELECTOR PLAN 2
small b/l pleural effusions, still overloaded but improving  -c/w IV lasix 40mg IV q12 today per cards, possible switch to po torey  -TTE 2/2019 with EF 50%, moderate to severe MR and moderate to severe TR with moderate pulmonary HTN  -monitor strict I/O, daily weight  -monitor cr/lytes, replete prn

## 2020-01-08 NOTE — DISCHARGE NOTE PROVIDER - NSDCFUSCHEDAPPT_GEN_ALL_CORE_FT
SONIA JACQUES ; 01/16/2020 ; [facility] [address] [place], [place] [address]SONIA ; 01/16/2020 ; [facility] [address] [place], [place] [address]SON[place] ; 01/22/2020 ; [facility] [address] [facility]  SONIA JACQUES ; 01/23/2020 ; [facility] [address]  SONIA JACQUES ; 01/23/2020 ; [facility] [address] [place], [place] [address]SON[place] ; 01/23/2020 ; [facility] [address] [place] [address] ; 01/30/2020 ; [facility] [address] [place] [address] ; 01/30/2020 ; [facility] [address] [place] [address] ; 02/05/2020 ; [facility] [address] [place] [address] ; 02/05/2020 ; [facility] [address] [place] [address] ; 02/12/2020 ; [facility] [address] [place] [address] ; 02/12/2020 ; [facility] [address] [place] [address] ; 02/19/2020 ; [facility] [address] [place] [address] ; 02/19/2020 ; [facility] [address] [facility] SONIA JACQUES ; 01/16/2020 ; [facility] [address] [place], [place] [address]SON[place] ; 01/16/2020 ; [facility] [address] [place], [place] [address]SON[place] ; 01/22/2020 ; [facility] [address] [facility]  SONIA JACQUES ; 01/23/2020 ; [facility] [address]  SONIA JACQUES ; 01/23/2020 ; [facility] [address] [place], [place] [address]SON[place] ; 01/23/2020 ; [facility] [address] [place], [place] [address] ; 01/30/2020 ; [facility] [address] [place], [place] [address] ; 01/30/2020 ; [facility] [address] [place] [address] ; 02/05/2020 ; [facility] [address] [place] [address] ; 02/05/2020 ; [facility] [address] [place] [address] ; 02/12/2020 ; [facility] [address] [place] [address] ; 02/12/2020 ; [facility] [address] [place] [address] ; 02/19/2020 ; [facility] [address] [place] [address] ; 02/19/2020 ; [facility] [address] [facility] SONIA JACQUES ; 01/16/2020 ; [facility] [address] [place], [place] [address]SON[place] ; 01/16/2020 ; [facility] [address] [place], [place] [address] ; 01/22/2020 ; [facility] [address] [facility]  SONIA JACQUES ; 01/23/2020 ; [facility] [address]  SONIA JACQUES ; 01/23/2020 ; [facility] [address] [place], [place] [address]SON[place] ; 01/23/2020 ; [facility] [address] [place], [place] [address] ; 01/30/2020 ; [facility] [address] [place], [place] [address] ; 01/30/2020 ; [facility] [address] [place], [place] [address] ; 02/05/2020 ; [facility] [address] [place], [place] [address] ; 02/05/2020 ; [facility] [address] [place], [place] [address] ; 02/12/2020 ; [facility] [address] [place], [place] [address] ; 02/12/2020 ; [facility] [address] [place], [place] [address] ; 02/19/2020 ; [facility] [address] [place] [address] ; 02/19/2020 ; [facility] [address] [facility] SONIA JACQUES ; 01/16/2020 ; [facility] [address] [place], [place] [address]SON[place] ; 01/16/2020 ; [facility] [address] [place], [place] [address]SON[place] ; 01/22/2020 ; [facility] Card[facility] [address] [facility]  SONIA JACQUES ; 01/23/2020 ; [facility] [address]  SONIA JACQUES ; 01/23/2020 ; [facility] [address] [place], [place] [address]SON[place] ; 01/23/2020 ; [facility] [address] [place], [place] [address]SON[place] ; 01/30/2020 ; [facility] [address] [place], [place] [address]SON[place] ; 01/30/2020 ; [facility] [address] [place], [place] [address]SON[place] ; 02/05/2020 ; [facility] [address] [place], [place] [address] ; 02/05/2020 ; [facility] [address] [place], [place] [address] ; 02/12/2020 ; [facility] [address] [place], [place] [address] ; 02/12/2020 ; [facility] [address] [place], [place] [address] ; 02/19/2020 ; [facility] [address] [place], [place] [address] ; 02/19/2020 ; [facility] [address] [facility] SONIA JACQUES ; 01/16/2020 ; [facility] [address] [place], [place] [address]SON[place] ; 01/16/2020 ; [facility] [address] [place], [place] [address]SON[place] ; 01/22/2020 ; [facility] Card[facility] [address] [facility]  SONIA JACQUES ; 01/23/2020 ; [facility] Card[facility] [address]  SONIA JACQUES ; 01/23/2020 ; [facility] [address] [place], [place] [address]SON[place] ; 01/23/2020 ; [facility] [address] [place], [place] [address] ; 01/30/2020 ; [facility] [address] [place], [place] [address] ; 01/30/2020 ; [facility] [address] [place], [place] [address] ; 02/05/2020 ; [facility] [address] [place], [place] [address] ; 02/05/2020 ; [facility] [address] [place], [place] [address] ; 02/12/2020 ; [facility] [address] [place], [place] [address] ; 02/12/2020 ; [facility] [address] [place], [place] [address] ; 02/19/2020 ; [facility] [address] [place], [place] [address] ; 02/19/2020 ; [facility] [address] [facility]

## 2020-01-08 NOTE — DISCHARGE NOTE PROVIDER - HOSPITAL COURSE
89 M w/pmh Afib on coumadin s/p pacemaker, BPH, CAD, GERD, HLD, HTN, MV repair urinary retention s/p recent transurethral bladder tumor resection , p/w SOB and  wheezing  x 4 days likely due viral URI/bronchitis  and acute on chronic diastolic HF exacerbation. Human metapneumovirus pneumonia. Plan: RVP+ with acute bronchitis and possible PNA given GGO on CT. As per pulm, s/p ABx for PNA with levalbuterol and hypertonic saline nebs. Also completed steroid with clinical improvement and satting well on RA. Seen and evaluated by Cards, s/p IV Lasix and edema and SOB are much improved and now stable on PO diuretics. Patient remains stable for DC home with close follow up with PCP and Cards as per Dr. Arias 89 M w/pmh Afib on coumadin s/p pacemaker, BPH, CAD, GERD, HLD, HTN, MV repair urinary retention s/p recent transurethral bladder tumor resection , p/w SOB and  wheezing  x 4 days likely due viral URI/bronchitis  and acute on chronic diastolic HF exacerbation. Human metapneumovirus pneumonia. Plan: RVP+ with acute bronchitis and possible PNA given GGO on CT. As per pulm, s/p ABx for PNA with levalbuterol and hypertonic saline nebs. Also completed steroid with clinical improvement and satting well on RA. Seen and evaluated by Cards, s/p IV Lasix and edema and SOB are much improved and now stable on PO diuretics. Patient remains stable for DC home with close follow up with PCP, Pulm, and Cards as per Dr. Arias 6 M w/pmh Afib on coumadin s/p pacemaker, BPH, CAD, GERD, HLD, HTN, MV repair urinary retention s/p recent transurethral bladder tumor resection , p/w SOB and  wheezing  x 4 days  likely due viral URI/bronchitis  and acute on chronic diastolic HF exacerbation        ·  Problem: Acute on chronic heart failure with preserved ejection fraction.  Plan: Breathing improved. CXR showed small b/l pleural effusions but improved. Reviewed Echo-2/2019 with EF 50%, moderate to severe MR and moderate to severe TR with moderate pulmonary HTN    -cardiology plan noted, Lasix to 40mg PO q12     -monitor strict I/O, daily weight    -            ·  Problem: Human metapneumovirus pneumonia.  Plan: Improved wheezing on levalbuterol and hypertonic saline nebs q4 ATC     -completed 5 days of Levaquin and Prednisone    -c/w mucinex 600mg bid    - will set up with nebulizer machine at home and outpatient pulm f/u with Dr Barrera in 1 -2 weeks, repeat chest CT in 4-6 weeks.             ·  Problem: Constipation, unspecified constipation type.  Plan: Didn't have BM for 3 days.     - given Miralax and Senna.             ·  Problem: Atrial fibrillation with RVR.  Plan: Rate controled a.fib 60s, v-Paced    - c/w lopressor 100mg bid, digoxin 0.125mg every other day    - INR 2.44- coumadin 4 mg tonight.         ·  Problem: BPH (benign prostatic hyperplasia).  Plan: stable    c/w tamsulosin 0.8mg daily.             Problem: HTN (hypertension). Plan: BP has been stable    -c/w home losartan and metoprolol.        ·  Problem: Bladder carcinoma.  Plan: high grade urothelial carcinoma on pathology     - outpatient  f/u.             ·  Problem: Prediabetes.  Plan: HbA1C 6.3% on 11/2019, FS stable likely more elevated than baseline due to steroid use    -c/w SWATI 6 M w/pmh Afib on coumadin s/p pacemaker, BPH, CAD, GERD, HLD, HTN, MV repair urinary retention s/p recent transurethral bladder tumor resection , p/w SOB and  wheezing  x 4 days  likely due viral URI/bronchitis  and acute on chronic diastolic HF exacerbation        ·  Problem: Acute on chronic heart failure with preserved ejection fraction.  Plan: Breathing improved. CXR showed small b/l pleural effusions but improved. Reviewed Echo-2/2019 with EF 50%, moderate to severe MR and moderate to severe TR with moderate pulmonary HTN    -cardiology plan noted, Lasix to 40mg PO q12     -monitor strict I/O, daily weight                ·  Problem: Human metapneumovirus pneumonia.  Plan: Improved wheezing on levalbuterol and hypertonic saline nebs q4 ATC     -completed 5 days of Levaquin and Prednisone    -c/w mucinex 600mg bid    - will set up with nebulizer machine at home and outpatient pulm f/u with Dr Barrera in 1 -2 weeks, repeat chest CT in 4-6 weeks.             ·  Problem: Constipation, unspecified constipation type.  Plan: dc on senna              ·  Problem: Atrial fibrillation with RVR.  Plan: Rate controled a.fib 60s, v-Paced    - c/w lopressor 100mg bid, digoxin 0.125mg every other day     dc on coumadin 4 mg .         ·  Problem: BPH (benign prostatic hyperplasia).  Plan: stable    c/w tamsulosin 0.8mg daily.             Problem: HTN (hypertension). Plan: BP has been stable    -c/w home losartan and metoprolol.        ·  Problem: Bladder carcinoma.  Plan: high grade urothelial carcinoma on pathology     - outpatient  f/u.             ·  Problem: Prediabetes.  Plan: HbA1C 6.3% on 11/2019, FS stable likely more elevated than baseline due to steroid use    -c/w SWATI This is a 89 M w/pmh Afib on coumadin s/p pacemaker, BPH, CAD, GERD, HLD, HTN, MV repair urinary retention s/p recent transurethral bladder tumor resection , p/w SOB and  wheezing  x 4 days. He was found to have dyspnea from likely due viral URI/bronchitis  and acute on chronic diastolic HF exacerbation. The patient was admitted in Tele, serial Allyssa were normal and EKG showed no ischemic changes. He was evaluated by cardiology and plan of care was as below-        ·  Problem: Acute on chronic heart failure with preserved ejection fraction.  Plan: Breathing improved. CXR showed small b/l pleural effusions but improved. Reviewed Echo-2/2019 with EF 50%, moderate to severe MR and moderate to severe TR with moderate pulmonary HTN    -cardiology plans to c/w Lasix to 40mg PO q12, Metoprolol 100mg bid and Digoxin 0.125mg every other day        ·  Problem: Human metapneumovirus pneumonia.  Plan: Improved wheezing on levalbuterol and hypertonic saline nebs q4 ATC     -completed 5 days of Levaquin and Prednisone    -c/w mucinex 600mg bid prn    - will set up with nebulizer machine at home and outpatient pulm f/u with Dr Barrera in 1 -2 weeks, repeat chest CT in 4-6 weeks.         ·  Problem: Atrial fibrillation with RVR.  Plan: Rate controled a.fib 60s, v-Paced    - c/w lopressor 100mg bid, digoxin 0.125mg every other day     dc on coumadin 4 mg tonight, outpatient cardiology- Dr Gates f/u .             * Problem: HTN (hypertension). Plan: BP has been stable    - c/w home lARB and metoprolol.        ·  Problem: Bladder carcinoma.  Plan: high grade urothelial carcinoma on pathology     - outpatient  f/u.             ·  Problem: Prediabetes.  Plan: HbA1C 6.3% on 11/2019, c/w home diabetic regimens and have outpatient f/u with PCP and endocrinologist.        Patient remains stable, Cardiology cleared for discharge. He will go home today and will f/u with his cardiology in a week.

## 2020-01-08 NOTE — CHART NOTE - NSCHARTNOTEFT_GEN_A_CORE
Pt seen for consult for vitamin K/coumadin education.  Chart reviewed, events noted.    Source: EMR, RN, pt, pt's wife    Per request of pt's wife, consistent vitamin K education provided in setting of patient on coumadin. Recommended consuming vitamin K sources consistently throughout week to avoid interactions with medication. Food sources of vitamin K reviewed, including dark leafy greens such as kale, spinach, or amina greens.  Per pt's wife, pt monitors intake of vitamin K foods at home. Pt advised to be aware of taking any supplements with vitamin K while on coumadin.  List of Vitamin K Content of Foods handout provided to pt's wife.   Reviewed low sodium, heart healthy diet recommendations per previous RD note.  Pt expressed understanding of current and previous educations provided.  Both pt and pt's wife had no follow up questions.  Both made aware RD remains available.     Diet: Regular, DASH/TLC    Pt denies nausea, vomiting, diarrhea, or constipation.     PO intake: Per pt, PO intake fair-good, varies with meals.  Observed pt finished sandwich on lunch tray at visit.    Previous nutrition diagnosis:  Inadequate Oral Intake  nutrition diagnosis is: ongoing; improving with increased PO intake    Plan:  1. Continue with DASH/TLC diet as tolerated.  2. Will continue to monitor and encourage PO intake.  3. Will continue to monitor PO intake, labs, weights, BM, skin, clinical course.    RD remains available.   Madelyn Carlisle RD, #[phone]

## 2020-01-08 NOTE — PROGRESS NOTE ADULT - SUBJECTIVE AND OBJECTIVE BOX
[facility] -- Preet Glover, Andreea Carranza Pannella, Patel, Savella  Office # [phone]      Follow Up:  CAD    Subjective/Observations: Patient seen and examined. Events noted. Resting comfortably in bed. No complaints of chest pain, dyspnea, or palpitations reported. No signs of orthopnea or PND.       REVIEW OF SYSTEMS: All other review of systems is negative unless indicated above    PAST MEDICAL & SURGICAL HISTORY:  Phimosis  Seizure: age 9, had 1 seizure, s/p fall  GERD (gastroesophageal reflux disease)  Valvular heart disease: S/p mitral valve repair  CAD (coronary artery disease): BMS x 1  in the LAD 1995; S/P CABG x2V 2013  BPH (benign prostatic hyperplasia)  HLD (hyperlipidemia)  HTN (hypertension)  Atrial fibrillation  Cataract  Glaucoma  H/O circumcision: 2018  Artificial pacemaker: St "[employer] Model #[id number] Serial #[id number]  S/P heart valve repair: mitral valve repair with annuloplasty ring 2013  S/P CABG (coronary artery bypass graft): x2 vessels 2013  S/P small bowel resection: due to perforated intestine  during colonoscopy around 2000  Breast cyst: bilateral around 50 years ago  Stented coronary artery: 1995      MEDICATIONS  (STANDING):  atorvastatin 10 milliGRAM(s) Oral at bedtime  dextrose 5%. 1000 milliLiter(s) (50 mL/Hr) IV Continuous <Continuous>  dextrose 50% Injectable 12.5 Gram(s) IV Push once  dextrose 50% Injectable 25 Gram(s) IV Push once  dextrose 50% Injectable 25 Gram(s) IV Push once  digoxin     Tablet 0.125 milliGRAM(s) Oral every other day  furosemide   Injectable 40 milliGRAM(s) IV Push every 12 hours  guaiFENesin  milliGRAM(s) Oral every 12 hours  insulin lispro (HumaLOG) corrective regimen sliding scale   SubCutaneous three times a day before meals  levalbuterol Inhalation 0.63 milliGRAM(s) Inhalation every 4 hours  levoFLOXacin  Tablet 750 milliGRAM(s) Oral once  losartan 100 milliGRAM(s) Oral daily  metoprolol tartrate 100 milliGRAM(s) Oral two times a day  sodium chloride 3%  Inhalation 4 milliLiter(s) Inhalation every 4 hours  tamsulosin 0.8 milliGRAM(s) Oral at bedtime    MEDICATIONS  (PRN):  acetaminophen   Tablet .. 650 milliGRAM(s) Oral every 4 hours PRN Mild Pain (1 - 3)  benzonatate 100 milliGRAM(s) Oral three times a day PRN Cough  dextrose 40% Gel 15 Gram(s) Oral once PRN Blood Glucose LESS THAN 70 milliGRAM(s)/deciliter  glucagon  Injectable 1 milliGRAM(s) IntraMuscular once PRN Glucose LESS THAN 70 milligrams/deciliter      Allergies    No Known Allergies    Intolerances            Vital Signs Last 24 Hrs  T(C): 36.5 (08 Jan 2020 05:07), Max: 37.1 (07 Jan 2020 13:34)  T(F): 97.7 (08 Jan 2020 05:07), Max: 98.7 (07 Jan 2020 13:34)  HR: 97 (08 Jan 2020 05:07) (89 - 102)  BP: 124/86 (08 Jan 2020 05:07) (101/92 - 125/72)  BP(mean): --  RR: 18 (08 Jan 2020 05:07) (17 - 18)  SpO2: 97% (08 Jan 2020 05:07) (90% - 99%)    I&O's Summary    07 Jan 2020 07:01  -  08 Jan 2020 07:00  --------------------------------------------------------  IN: 1200 mL / OUT: 1225 mL / NET: -25 mL          PHYSICAL EXAM:  TELE: AF 80-90  Constitutional: NAD, awake   HEENT: Moist Mucous Membranes, Anicteric  Pulmonary: Decreased breath sounds b/l. coarse bs, bl crackles  Cardiovascular: IRRR, S1 and S2, No murmurs, rubs, gallops or clicks  Gastrointestinal: Bowel Sounds present, soft, nontender.   Lymph: No peripheral edema. No lymphadenopathy.  Skin: No visible rashes or ulcers.  Psych:  Mood & affect appropriate for situation    LABS: All Labs Reviewed:                        11.9 11.73 )-----------( 178      ( 07 Jan 2020 09:28 )             36.6     08 Jan 2020 06:07    138    |  97     |  24     ----------------------------<  150    4.8     |  29     |  1.03   07 Jan 2020 06:38    138    |  95     |  25     ----------------------------<  169    3.4     |  27     |  1.02     Ca    9.6        08 Jan 2020 06:07  Ca    9.0        07 Jan 2020 06:38  Mg     2.1       08 Jan 2020 06:07  Mg     2.0       07 Jan 2020 06:38    TPro  6.1    /  Alb  3.3    /  TBili  0.7    /  DBili  x      /  AST  22     /  ALT  28     /  AlkPhos  132    07 Jan 2020 06:38    PT/INR - ( 07 Jan 2020 08:53 )   PT: 24.0 sec;   INR: 2.08 ratio         PTT - ( 07 Jan 2020 08:53 )  PTT:33.2 sec

## 2020-01-08 NOTE — DISCHARGE NOTE PROVIDER - NSDCMRMEDTOKEN_GEN_ALL_CORE_FT
atorvastatin 10 mg oral tablet: 1 tab(s) orally once a day  biotin:   Coumadin 4 mg oral tablet: 1 tab(s) orally once a day  digoxin 125 mcg (0.125 mg) oral tablet: 1 tab(s) orally every other day  ferrous sulfate:   furosemide 40 mg oral tablet: 1 tab(s) orally once a day  losartan 100 mg oral tablet: 1 tab(s) orally once a day  metoprolol tartrate 50 mg oral tablet: 1 tab(s) orally 2 times a day  tamsulosin 0.4 mg oral capsule: 1 cap(s) orally 2 times a day  Vitamin D3: atorvastatin 10 mg oral tablet: 1 tab(s) orally once a day (at bedtime)  benzonatate 100 mg oral capsule: 1 cap(s) orally 3 times a day, As needed, Cough  biotin:   Coumadin 4 mg oral tablet: 1 tab(s) orally once a day  digoxin 125 mcg (0.125 mg) oral tablet: 1 tab(s) orally every other day  ferrous sulfate: 325 milligram(s) orally once a day  furosemide 40 mg oral tablet: 1 tab(s) orally 2 times a day  guaiFENesin 600 mg oral tablet, extended release: 1 tab(s) orally every 12 hours  losartan 100 mg oral tablet: 1 tab(s) orally once a day  metoprolol tartrate 100 mg oral tablet: 1 tab(s) orally 2 times a day  senna oral tablet: 2 tab(s) orally once a day (at bedtime)  tamsulosin 0.4 mg oral capsule: 1 cap(s) orally 2 times a day  Vitamin D3:

## 2020-01-08 NOTE — PROGRESS NOTE ADULT - ASSESSMENT
89M PMH Afib (coumadin), PPM, mod pHTN, mitral regurgitation, MVR, BPH presents with dyspnea and wheezing found to have bilateral bibasilar GGO on CT and is RVP+ for hMPV.   Clinically improving.     1. Human MPV pneumonia  -Wheezing, SOB- improving with steroids and diuresis  - Would continue with Levaquin 750 IV QD - day 4/5, c/w Steroids Prednisone 40 mg QD x 5 days  - Bronchodilators - Xopenex Q 4 hours followed by hypertonic inhaled saline and Acapella device to aid in airway clearance.   - Supplemental O2, goal sats 92-96%- on my exam, 96-97% on RA at rest. Would ambulate and check O2 sats  - Incentive spirometer, out of bed as tolerated.    2. HFpEF, Afib with RVR  - Cardiac optimization as per cardiology  - c/w diuresis and strict ins/outs, goal net negative daily    3. DVT prophylaxis, out of bed to chair  Dispo home- likely today or tomorrow- would send home with bronchodilators - Xopenex and Pulmicort nebs. Can follow up with me in the office in 1-2 weeks and for repeat CT chest at 6 weeks  Will follow up    Attending Attestation:   I was physically present for the key portions of the evaluation and management (E/M) service provided.  I agree with the above history, physical, and plan which I have reviewed and edited where appropriate.     35 minutes spent on total encounter; more than 50% of the visit was spent counseling and/or coordinating care by the attending physician.     Plan discussed with patient and nurse.

## 2020-01-08 NOTE — DISCHARGE NOTE PROVIDER - CARE PROVIDERS DIRECT ADDRESSES
[email or web address]

## 2020-01-08 NOTE — DISCHARGE NOTE PROVIDER - NSDCHHHOMEBOUND_GEN_ALL_CORE
Fall risk/Shortness of breath with minimal ambulation/Chest pain/weakness during/after ambulation   greater than 20 feet/Pain greater than 7 on scale of 10 on ambulation

## 2020-01-08 NOTE — PROGRESS NOTE ADULT - SUBJECTIVE AND OBJECTIVE BOX
Patient is a 89y old  Male who presents with a chief complaint of Wheezing and SOB x 4 days (08 Jan 2020 10:31)      SUBJECTIVE / OVERNIGHT EVENTS: No overnight events. Denies sob, chest pain, fever. Cough and wheezing improving but still present.     Tele reviewed: Afib   80-90, pvcs, triplets, couplets      ADDITIONAL REVIEW OF SYSTEMS: Negative except for above    MEDICATIONS  (STANDING):  atorvastatin 10 milliGRAM(s) Oral at bedtime  dextrose 5%. 1000 milliLiter(s) (50 mL/Hr) IV Continuous <Continuous>  dextrose 50% Injectable 12.5 Gram(s) IV Push once  dextrose 50% Injectable 25 Gram(s) IV Push once  dextrose 50% Injectable 25 Gram(s) IV Push once  digoxin     Tablet 0.125 milliGRAM(s) Oral every other day  furosemide   Injectable 40 milliGRAM(s) IV Push every 12 hours  guaiFENesin  milliGRAM(s) Oral every 12 hours  insulin lispro (HumaLOG) corrective regimen sliding scale   SubCutaneous three times a day before meals  levalbuterol Inhalation 0.63 milliGRAM(s) Inhalation every 4 hours  losartan 100 milliGRAM(s) Oral daily  metoprolol tartrate 100 milliGRAM(s) Oral two times a day  sodium chloride 3%  Inhalation 4 milliLiter(s) Inhalation every 4 hours  tamsulosin 0.8 milliGRAM(s) Oral at bedtime  warfarin 4 milliGRAM(s) Oral once    MEDICATIONS  (PRN):  acetaminophen   Tablet .. 650 milliGRAM(s) Oral every 4 hours PRN Mild Pain (1 - 3)  benzonatate 100 milliGRAM(s) Oral three times a day PRN Cough  dextrose 40% Gel 15 Gram(s) Oral once PRN Blood Glucose LESS THAN 70 milliGRAM(s)/deciliter  glucagon  Injectable 1 milliGRAM(s) IntraMuscular once PRN Glucose LESS THAN 70 milligrams/deciliter      CAPILLARY BLOOD GLUCOSE      POCT Blood Glucose.: 154 mg/dL (08 Jan 2020 07:42)  POCT Blood Glucose.: 198 mg/dL (07 Jan 2020 21:26)  POCT Blood Glucose.: 337 mg/dL (07 Jan 2020 16:55)  POCT Blood Glucose.: 279 mg/dL (07 Jan 2020 12:15)    I&O's Summary    07 Jan 2020 07:01  -  08 Jan 2020 07:00  --------------------------------------------------------  IN: 1200 mL / OUT: 1225 mL / NET: -25 mL    08 Jan 2020 07:01  -  08 Jan 2020 11:49  --------------------------------------------------------  IN: 240 mL / OUT: 300 mL / NET: -60 mL        PHYSICAL EXAM:  Vital Signs Last 24 Hrs  T(C): 36.5 (08 Jan 2020 05:07), Max: 37.1 (07 Jan 2020 13:34)  T(F): 97.7 (08 Jan 2020 05:07), Max: 98.7 (07 Jan 2020 13:34)  HR: 97 (08 Jan 2020 05:07) (89 - 102)  BP: 124/86 (08 Jan 2020 05:07) (101/92 - 125/72)  BP(mean): --  RR: 18 (08 Jan 2020 05:07) (17 - 18)  SpO2: 97% (08 Jan 2020 05:07) (90% - 99%)    PHYSICAL EXAM:  GENERAL: NAD, well-developed on RA  HEAD:  Atraumatic, Normocephalic  NECK: Supple, No JVD  CHEST/LUNG: rhonci on right, faint wheezing on left much improved  HEART: IRRegular rhythm; +systolic murmur  ABDOMEN: Soft, Nontender, Nondistended; Bowel sounds present  EXTREMITIES:  2+ Peripheral Pulses, trace pitting ankle edema b/l   PSYCH: AAOx3  NEUROLOGY: non-focal      LABS:                        13.7   12.99 )-----------( 221      ( 08 Jan 2020 09:12 )             42.6     01-08    138  |  97  |  24<H>  ----------------------------<  150<H>  4.8   |  29  |  1.03    Ca    9.6      08 Jan 2020 06:07  Mg     2.1     01-08    TPro  6.1  /  Alb  3.3  /  TBili  0.7  /  DBili  x   /  AST  22  /  ALT  28  /  AlkPhos  132<H>  01-07    PT/INR - ( 08 Jan 2020 08:36 )   PT: 27.2 sec;   INR: 2.30 ratio         PTT - ( 07 Jan 2020 08:53 )  PTT:33.2 sec            RADIOLOGY & ADDITIONAL TESTS:    Imaging Personally Reviewed:    Electrocardiogram Personally Reviewed:    COORDINATION OF CARE:  Care Discussed with Consultants/Other Providers [Y/N]:  Prior or Outpatient Records Reviewed [Y/N]:

## 2020-01-08 NOTE — PROGRESS NOTE ADULT - ASSESSMENT
Mr. Johnson is a 89 year old man with a history of CAD s/p PCI to the mid LAD with a BMS in 1994, double vessel CABG and mitral valve repair February 8th, 2013, paroxysmal atrial fibrillation/flutter with TBS s/p [facility] dual chamber PPM, on Coumadin for stroke risk reduction, hypertension and hyperlipidemia, mild LV dysfunction.  His most recent echo in our office demonstrated moderate to severe MR and moderate to severe TR with moderate pulmonary hypertension as of 2/2019. His EF was around 50%.  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    - he is demonstrating wheezing and shortness of breath, and there is a viral component to this.  He is a bit improved from yesterday.  - Started on ABx per pulmonary.    - he has mild overload on exam, with small shirley effusions on cxr. He does have severe mr/tr and elevated pulmonary pressures. His BNP is elevated and has trended up.  We performed a non contrast CT of the chest and findings consistent with pulmonary edema.  Need to maintain a net negative fluid balance with IV Lasix. If his creatinine worsens, would decrease to daily dosing. But today is cr remains stable so i would cont  - Please continue to maintain strict I/Os, monitor daily weights, Cr, and K.   - Steroids and nebulizers   - Needs continued support with supplemental oxygen and Chest PT  - replete K to >4, Mg>2    - af rates are ok, Continue metoprolol 100 bid and digoxin. His faster heart rates seem to be reactive.  - continue coumadin for goal inr 2-3.  - continue with losartan  - no sign of acute ischemia. his hs troponins are negative.   - watch creatinine and electrolytes. Keep K>4, Mg>2  - will follow with you.

## 2020-01-08 NOTE — PROGRESS NOTE ADULT - SUBJECTIVE AND OBJECTIVE BOX
[facility] - Division of Pulmonary, Critical Care and Sleep Medicine   Please call [phone] between 8am-pm weekdays, [phone] after hours and weekends    Interval Events: No events overnight - feeling much better. Ambulated with PT yesterday - minimal dyspnea and O2 sats remained WNL    REVIEW OF SYSTEMS:  CV: [- ] chest pain   Resp: [+ ] cough [- ] shortness of breath   [x ] All other systems negative  [ ] Unable to assess ROS because ________    OBJECTIVE:  ICU Vital Signs Last 24 Hrs  T(C): 36.5 (08 Jan 2020 05:07), Max: 37.1 (07 Jan 2020 13:34)  T(F): 97.7 (08 Jan 2020 05:07), Max: 98.7 (07 Jan 2020 13:34)  HR: 97 (08 Jan 2020 05:07) (89 - 102)  BP: 124/86 (08 Jan 2020 05:07) (101/92 - 125/72)  BP(mean): --  ABP: --  ABP(mean): --  RR: 18 (08 Jan 2020 05:07) (17 - 18)  SpO2: 97% (08 Jan 2020 05:07) (90% - 99%)        01-07 @ 07:01 - 01-08 @ 07:00  --------------------------------------------------------  IN: 1200 mL / OUT: 1225 mL / NET: -25 mL    01-08 @ 07:01 - 01-08 @ 10:32  --------------------------------------------------------  IN: 240 mL / OUT: 300 mL / NET: -60 mL      CAPILLARY BLOOD GLUCOSE      POCT Blood Glucose.: 154 mg/dL (08 Jan 2020 07:42)      PHYSICAL EXAM:  General: NAD  HEENT: NC/AT  Neck: supple  Respiratory:  diffuse rhonchi  Cardiovascular:  RRR, no m/r/g  Abdomen: soft, NT/ND, +BS  Extremities: no clubbing, cyanosis or edema, warm  Skin: no rash  Neurological: AAOx3, non focal exam  Psychiatry: not anxious appearing, normal affect and mood    HOSPITAL MEDICATIONS:  MEDICATIONS  (STANDING):  atorvastatin 10 milliGRAM(s) Oral at bedtime  dextrose 5%. 1000 milliLiter(s) (50 mL/Hr) IV Continuous <Continuous>  dextrose 50% Injectable 12.5 Gram(s) IV Push once  dextrose 50% Injectable 25 Gram(s) IV Push once  dextrose 50% Injectable 25 Gram(s) IV Push once  digoxin     Tablet 0.125 milliGRAM(s) Oral every other day  furosemide   Injectable 40 milliGRAM(s) IV Push every 12 hours  guaiFENesin  milliGRAM(s) Oral every 12 hours  insulin lispro (HumaLOG) corrective regimen sliding scale   SubCutaneous three times a day before meals  levalbuterol Inhalation 0.63 milliGRAM(s) Inhalation every 4 hours  losartan 100 milliGRAM(s) Oral daily  metoprolol tartrate 100 milliGRAM(s) Oral two times a day  sodium chloride 3%  Inhalation 4 milliLiter(s) Inhalation every 4 hours  tamsulosin 0.8 milliGRAM(s) Oral at bedtime  warfarin 4 milliGRAM(s) Oral once    MEDICATIONS  (PRN):  acetaminophen   Tablet .. 650 milliGRAM(s) Oral every 4 hours PRN Mild Pain (1 - 3)  benzonatate 100 milliGRAM(s) Oral three times a day PRN Cough  dextrose 40% Gel 15 Gram(s) Oral once PRN Blood Glucose LESS THAN 70 milliGRAM(s)/deciliter  glucagon  Injectable 1 milliGRAM(s) IntraMuscular once PRN Glucose LESS THAN 70 milligrams/deciliter      LABS:                        13.7   12.99 )-----------( 221      ( 08 Jan 2020 09:12 )             42.6     Hgb Trend: 13.7<--, 11.9<--, 10.3<--, 10.6<--, 10.8<--  01-08    138  |  97  |  24<H>  ----------------------------<  150<H>  4.8   |  29  |  1.03    Ca    9.6      08 Jan 2020 06:07  Mg     2.1     01-08    TPro  6.1  /  Alb  3.3  /  TBili  0.7  /  DBili  x   /  AST  22  /  ALT  28  /  AlkPhos  132<H>  01-07    Creatinine Trend: 1.03<--, 1.02<--, 1.05<--, 0.93<--, 0.87<--, 0.80<--  PT/INR - ( 08 Jan 2020 08:36 )   PT: 27.2 sec;   INR: 2.30 ratio         PTT - ( 07 Jan 2020 08:53 )  PTT:33.2 sec          MICROBIOLOGY:     RADIOLOGY:  [x ] Reviewed and interpreted by me

## 2020-01-09 NOTE — PROGRESS NOTE ADULT - PROBLEM SELECTOR PLAN 2
small b/l pleural effusions, still overloaded but improving  -c/w IV lasix 40mg IV q12 today per cards, cr stable,   -TTE 2/2019 with EF 50%, moderate to severe MR and moderate to severe TR with moderate pulmonary HTN  -monitor strict I/O, daily weight  -monitor cr/lytes, replete prn

## 2020-01-09 NOTE — PROGRESS NOTE ADULT - ASSESSMENT
89M PMH Afib (coumadin), PPM, mod pHTN, mitral regurgitation, MVR, BPH presents with dyspnea and wheezing found to have bilateral bibasilar GGO on CT and is RVP+ for hMPV.   Clinically improving.     1. Human MPV pneumonia  -Wheezing, SOB- improving with steroids and diuresis  - Would continue with Levaquin 750 IV QD - day 4/5, c/w Steroids Prednisone 40 mg QD x 5 days  - Bronchodilators - Xopenex Q 4 hours followed by hypertonic inhaled saline and Acapella device to aid in airway clearance.   - Supplemental O2, goal sats 92-96%- on my exam, 96-97% on RA at rest. Would ambulate and check O2 sats  - Incentive spirometer, out of bed as tolerated.    2. HFpEF, Afib with RVR  - Cardiac optimization as per cardiology  - c/w diuresis and strict ins/outs, goal net negative daily    3. DVT prophylaxis, out of bed to chair  Dispo home- likely tomorrow- would send home with bronchodilators - Xopenex and Pulmicort nebs. Can follow up with me in the office in 1-2 weeks and for repeat CT chest at 6 weeks  Will follow up    Attending Attestation:   I was physically present for the key portions of the evaluation and management (E/M) service provided.  I agree with the above history, physical, and plan which I have reviewed and edited where appropriate.     35 minutes spent on total encounter; more than 50% of the visit was spent counseling and/or coordinating care by the attending physician.     Plan discussed with patient

## 2020-01-09 NOTE — PROGRESS NOTE ADULT - PROBLEM SELECTOR PLAN 7
a1c 6.3 on 11/2019, FS stable likely more elevated than baseline due to steroid use  -c/w SWATI  -monitor FS

## 2020-01-09 NOTE — PROGRESS NOTE ADULT - PROBLEM SELECTOR PLAN 3
c/w lopressor 100mg bid, digoxin 0.125mg every other day  INR 3.1 today, hold coumadin tonight, no s/s of bleeding (home dose is 4mg)  -monitor INR daily

## 2020-01-09 NOTE — PROGRESS NOTE ADULT - SUBJECTIVE AND OBJECTIVE BOX
Patient is a 89y old  Male who presents with a chief complaint of Wheezing and SOB x 4 days (09 Jan 2020 09:30)      SUBJECTIVE / OVERNIGHT EVENTS: No overnight events. Maggie sob, chest pain, +cough, denies f/c,     Tele reviewed: Afib , pvcs, triplets,       ADDITIONAL REVIEW OF SYSTEMS: Negative except for above    MEDICATIONS  (STANDING):  atorvastatin 10 milliGRAM(s) Oral at bedtime  dextrose 5%. 1000 milliLiter(s) (50 mL/Hr) IV Continuous <Continuous>  dextrose 50% Injectable 12.5 Gram(s) IV Push once  dextrose 50% Injectable 25 Gram(s) IV Push once  dextrose 50% Injectable 25 Gram(s) IV Push once  digoxin     Tablet 0.125 milliGRAM(s) Oral every other day  furosemide   Injectable 40 milliGRAM(s) IV Push every 12 hours  guaiFENesin  milliGRAM(s) Oral every 12 hours  insulin lispro (HumaLOG) corrective regimen sliding scale   SubCutaneous three times a day before meals  levalbuterol Inhalation 0.63 milliGRAM(s) Inhalation every 4 hours  losartan 100 milliGRAM(s) Oral daily  metoprolol tartrate 100 milliGRAM(s) Oral two times a day  sodium chloride 3%  Inhalation 4 milliLiter(s) Inhalation every 4 hours  tamsulosin 0.8 milliGRAM(s) Oral at bedtime    MEDICATIONS  (PRN):  acetaminophen   Tablet .. 650 milliGRAM(s) Oral every 4 hours PRN Mild Pain (1 - 3)  benzonatate 100 milliGRAM(s) Oral three times a day PRN Cough  dextrose 40% Gel 15 Gram(s) Oral once PRN Blood Glucose LESS THAN 70 milliGRAM(s)/deciliter  glucagon  Injectable 1 milliGRAM(s) IntraMuscular once PRN Glucose LESS THAN 70 milligrams/deciliter      CAPILLARY BLOOD GLUCOSE      POCT Blood Glucose.: 142 mg/dL (09 Jan 2020 07:44)  POCT Blood Glucose.: 203 mg/dL (08 Jan 2020 21:13)  POCT Blood Glucose.: 165 mg/dL (08 Jan 2020 16:54)    I&O's Summary    08 Jan 2020 07:01  -  09 Jan 2020 07:00  --------------------------------------------------------  IN: 800 mL / OUT: 2775 mL / NET: -1975 mL    09 Jan 2020 07:01  -  09 Jan 2020 12:20  --------------------------------------------------------  IN: 240 mL / OUT: 900 mL / NET: -660 mL        PHYSICAL EXAM:  Vital Signs Last 24 Hrs  T(C): 36.4 (09 Jan 2020 05:06), Max: 36.4 (09 Jan 2020 05:06)  T(F): 97.5 (09 Jan 2020 05:06), Max: 97.5 (09 Jan 2020 05:06)  HR: 83 (09 Jan 2020 11:30) (83 - 102)  BP: 110/776 (09 Jan 2020 11:30) (110/776 - 137/72)  BP(mean): --  RR: 18 (09 Jan 2020 05:06) (18 - 18)  SpO2: 95% (09 Jan 2020 11:30) (95% - 97%)      PHYSICAL EXAM:  GENERAL: NAD, well-developed on RA  HEAD:  Atraumatic, Normocephalic  NECK: Supple, No JVD  CHEST/LUNG: wheezing resolved, b/l rhonci, trace bibasilar crackles  HEART: Irregular rhythm; +systolic murmur  ABDOMEN: Soft, Nontender, Nondistended; Bowel sounds present  EXTREMITIES:  2+ Peripheral Pulses, trace pitting ankle edema b/l unchanged  PSYCH: AAOx3  NEUROLOGY: non-focal        LABS:                        13.4   11.96 )-----------( 214      ( 09 Jan 2020 09:40 )             42.7     01-09    135  |  94<L>  |  30<H>  ----------------------------<  150<H>  4.4   |  28  |  1.16    Ca    9.4      09 Jan 2020 06:34  Mg     2.1     01-08      PT/INR - ( 09 Jan 2020 08:25 )   PT: 36.8 sec;   INR: 3.12 ratio         PTT - ( 09 Jan 2020 08:25 )  PTT:36.3 sec            RADIOLOGY & ADDITIONAL TESTS:    Imaging Personally Reviewed:    Electrocardiogram Personally Reviewed:    COORDINATION OF CARE:  Care Discussed with Consultants/Other Providers [Y/N]:  Prior or Outpatient Records Reviewed [Y/N]:

## 2020-01-09 NOTE — PROGRESS NOTE ADULT - PROBLEM SELECTOR PLAN 1
RVP+ with acute bronchitis and likely PNA given GGO on CT  -not sob, wheezing improved  -c/w levalbuterol and hypertonic saline nebs q4 ATC   -completed 5 days of levaquin and prendisone  -c/w mucinex 600mg bid  -02 sat stable on RA,  -will set up with nebulizer machine at home and outpatient pulm f/u with Dr Wen in 1 -2 weeks, repeat chest CT in 4-6 weeks

## 2020-01-09 NOTE — PROGRESS NOTE ADULT - ASSESSMENT
89 year old man with a history of CAD s/p PCI to the mid LAD with a BMS in 1994, double vessel CABG and mitral valve repair February 8th, 2013, paroxysmal atrial fibrillation/flutter with TBS s/p [facility] dual chamber PPM, on Coumadin for stroke risk reduction, hypertension and hyperlipidemia, mild LV dysfunction.  His most recent echo in our office demonstrated moderate to severe MR and moderate to severe TR with moderate pulmonary hypertension as of 2/2019. His EF was around 50%.  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    - lung exam remains quite abnormal, and though it is difficult to exclude pulm edema, his issues are more likely pulm than cardiac.  He is a bit improved from yesterday.  - he had mild overload on exam, with small shirley effusions on cxr. He does have severe mr/tr and elevated pulmonary pressures. His BNP is elevated and has trended up.  We performed a non contrast CT of the chest and findings consistent with pulmonary edema.  Need to maintain a net negative fluid balance with IV Lasix. If his creatinine worsens, would decrease to daily dosing. But today is cr remains stable so i would cont for today.  He is 2L net neg, and it might be reasonable to change to po tomorrow  - Please continue to maintain strict I/Os, monitor daily weights, Cr, and K.   - Steroids and nebulizers   - Needs continued support with supplemental oxygen and Chest PT  - replete K to >4, Mg>2    - af rates are ok, Continue metoprolol 100 bid and digoxin. His faster heart rates seem to be reactive.  - continue coumadin for goal inr 2-3.  - continue with losartan  - no sign of acute ischemia. his hs troponins are negative.   - watch creatinine and electrolytes. Keep K>4, Mg>2  - will follow with you.

## 2020-01-10 NOTE — PROGRESS NOTE ADULT - PROBLEM SELECTOR PLAN 9
dvt ppx: coumadin  PT rec home PT  Dispo: likely discharge home tomorrow with home PT if stable on po diuretics and + BM

## 2020-01-10 NOTE — DISCHARGE NOTE NURSING/CASE MANAGEMENT/SOCIAL WORK - PATIENT PORTAL LINK FT
You can access the FollowMyHealth Patient Portal offered by [facility] by registering at the following website: [email or web address]. By joining [facility]’s FollowMyHealth portal, you will also be able to view your health information using other applications (apps) compatible with our system.

## 2020-01-10 NOTE — PROGRESS NOTE ADULT - SUBJECTIVE AND OBJECTIVE BOX
[facility] - Division of Pulmonary, Critical Care and Sleep Medicine   Please call [phone] between 8am-pm weekdays, [phone] after hours and weekends    Interval Events: No events overnight. Feels well - still with cough.     REVIEW OF SYSTEMS:  CV: [ ] chest pain   Resp: [ ] cough [ ] shortness of breath   [ ] All other systems negative  [ ] Unable to assess ROS because ________    OBJECTIVE:  ICU Vital Signs Last 24 Hrs  T(C): 36.4 (10 Inocencio 2020 11:37), Max: 37.4 (09 Jan 2020 21:12)  T(F): 97.5 (10 Inocencio 2020 11:37), Max: 99.4 (09 Jan 2020 21:12)  HR: 79 (10 Inocencio 2020 11:37) (78 - 92)  BP: 82/54 (10 Inocencio 2020 11:37) (82/54 - 133/87)  BP(mean): --  ABP: --  ABP(mean): --  RR: 16 (10 Inocencio 2020 11:37) (16 - 17)  SpO2: 96% (10 Inocencio 2020 11:37) (96% - 98%)        01-09 @ 07:01  -  01-10 @ 07:00  --------------------------------------------------------  IN: 780 mL / OUT: 2325 mL / NET: -1545 mL    01-10 @ 07:01  -  01-10 @ 11:54  --------------------------------------------------------  IN: 450 mL / OUT: 0 mL / NET: 450 mL      CAPILLARY BLOOD GLUCOSE      POCT Blood Glucose.: 137 mg/dL (10 Inocencio 2020 08:49)      PHYSICAL EXAM:  General: NAD  HEENT: NC/AT  Lymph Nodes: no cervical or supraclavicular lymphadenopathy  Neck: supple  Respiratory:  CTA b/l, no wheezes, crackles or rhonchi  Cardiovascular:  RRR, no m/r/g  Abdomen: soft, NT/ND, +BS  Extremities: no clubbing, cyanosis or edema, warm  Skin: no rash  Neurological: AAOx3, non focal exam  Psychiatry: not anxious appearing, normal affect and mood    HOSPITAL MEDICATIONS:  MEDICATIONS  (STANDING):  atorvastatin 10 milliGRAM(s) Oral at bedtime  dextrose 5%. 1000 milliLiter(s) (50 mL/Hr) IV Continuous <Continuous>  dextrose 50% Injectable 12.5 Gram(s) IV Push once  dextrose 50% Injectable 25 Gram(s) IV Push once  dextrose 50% Injectable 25 Gram(s) IV Push once  digoxin     Tablet 0.125 milliGRAM(s) Oral every other day  furosemide    Tablet 40 milliGRAM(s) Oral two times a day  guaiFENesin  milliGRAM(s) Oral every 12 hours  insulin lispro (HumaLOG) corrective regimen sliding scale   SubCutaneous three times a day before meals  levalbuterol Inhalation 0.63 milliGRAM(s) Inhalation every 4 hours  losartan 100 milliGRAM(s) Oral daily  metoprolol tartrate 100 milliGRAM(s) Oral two times a day  senna 2 Tablet(s) Oral at bedtime  sodium chloride 3%  Inhalation 4 milliLiter(s) Inhalation every 4 hours  tamsulosin 0.8 milliGRAM(s) Oral at bedtime    MEDICATIONS  (PRN):  acetaminophen   Tablet .. 650 milliGRAM(s) Oral every 4 hours PRN Mild Pain (1 - 3)  benzonatate 100 milliGRAM(s) Oral three times a day PRN Cough  dextrose 40% Gel 15 Gram(s) Oral once PRN Blood Glucose LESS THAN 70 milliGRAM(s)/deciliter  glucagon  Injectable 1 milliGRAM(s) IntraMuscular once PRN Glucose LESS THAN 70 milligrams/deciliter      LABS:                        13.4   11.96 )-----------( 214      ( 09 Jan 2020 09:40 )             42.7     Hgb Trend: 13.4<--, 13.7<--, 11.9<--, 10.3<--, 10.6<--  01-09    135  |  94<L>  |  30<H>  ----------------------------<  150<H>  4.4   |  28  |  1.16    Ca    9.4      09 Jan 2020 06:34      Creatinine Trend: 1.16<--, 1.03<--, 1.02<--, 1.05<--, 0.93<--, 0.87<--  PT/INR - ( 10 Inocencio 2020 08:20 )   PT: 31.8 sec;   INR: 2.73 ratio         PTT - ( 09 Jan 2020 08:25 )  PTT:36.3 sec          MICROBIOLOGY:     RADIOLOGY:  [ ] Reviewed and interpreted by me [facility] - Division of Pulmonary, Critical Care and Sleep Medicine   Please call [phone] between 8am-pm weekdays, [phone] after hours and weekends    Interval Events: No events overnight. Feels well - still with cough.     REVIEW OF SYSTEMS:  CV: [ -] chest pain   Resp: [+ ] cough [- ] shortness of breath   [ x] All other systems negative  [ ] Unable to assess ROS because ________    OBJECTIVE:  ICU Vital Signs Last 24 Hrs  T(C): 36.4 (10 Inocencio 2020 11:37), Max: 37.4 (09 Jan 2020 21:12)  T(F): 97.5 (10 Inocencio 2020 11:37), Max: 99.4 (09 Jan 2020 21:12)  HR: 79 (10 Inocencio 2020 11:37) (78 - 92)  BP: 82/54 (10 Inocencio 2020 11:37) (82/54 - 133/87)  BP(mean): --  ABP: --  ABP(mean): --  RR: 16 (10 Inocencio 2020 11:37) (16 - 17)  SpO2: 96% (10 Inocencio 2020 11:37) (96% - 98%)        01-09 @ 07:01  -  01-10 @ 07:00  --------------------------------------------------------  IN: 780 mL / OUT: 2325 mL / NET: -1545 mL    01-10 @ 07:01  -  01-10 @ 11:54  --------------------------------------------------------  IN: 450 mL / OUT: 0 mL / NET: 450 mL      CAPILLARY BLOOD GLUCOSE      POCT Blood Glucose.: 137 mg/dL (10 Inocencio 2020 08:49)      PHYSICAL EXAM:  General: NAD  HEENT: NC/AT  Neck: supple  Respiratory:  scattered rhonchi, no wheezes or crackles  Cardiovascular:  RRR, no m/r/g  Abdomen: soft, NT/ND, +BS  Extremities: no clubbing, cyanosis or edema, warm  Skin: no rash  Neurological: AAOx3, non focal exam  Psychiatry: not anxious appearing, normal affect and mood    HOSPITAL MEDICATIONS:  MEDICATIONS  (STANDING):  atorvastatin 10 milliGRAM(s) Oral at bedtime  dextrose 5%. 1000 milliLiter(s) (50 mL/Hr) IV Continuous <Continuous>  dextrose 50% Injectable 12.5 Gram(s) IV Push once  dextrose 50% Injectable 25 Gram(s) IV Push once  dextrose 50% Injectable 25 Gram(s) IV Push once  digoxin     Tablet 0.125 milliGRAM(s) Oral every other day  furosemide    Tablet 40 milliGRAM(s) Oral two times a day  guaiFENesin  milliGRAM(s) Oral every 12 hours  insulin lispro (HumaLOG) corrective regimen sliding scale   SubCutaneous three times a day before meals  levalbuterol Inhalation 0.63 milliGRAM(s) Inhalation every 4 hours  losartan 100 milliGRAM(s) Oral daily  metoprolol tartrate 100 milliGRAM(s) Oral two times a day  senna 2 Tablet(s) Oral at bedtime  sodium chloride 3%  Inhalation 4 milliLiter(s) Inhalation every 4 hours  tamsulosin 0.8 milliGRAM(s) Oral at bedtime    MEDICATIONS  (PRN):  acetaminophen   Tablet .. 650 milliGRAM(s) Oral every 4 hours PRN Mild Pain (1 - 3)  benzonatate 100 milliGRAM(s) Oral three times a day PRN Cough  dextrose 40% Gel 15 Gram(s) Oral once PRN Blood Glucose LESS THAN 70 milliGRAM(s)/deciliter  glucagon  Injectable 1 milliGRAM(s) IntraMuscular once PRN Glucose LESS THAN 70 milligrams/deciliter      LABS:                        13.4   11.96 )-----------( 214      ( 09 Jan 2020 09:40 )             42.7     Hgb Trend: 13.4<--, 13.7<--, 11.9<--, 10.3<--, 10.6<--  01-09    135  |  94<L>  |  30<H>  ----------------------------<  150<H>  4.4   |  28  |  1.16    Ca    9.4      09 Jan 2020 06:34      Creatinine Trend: 1.16<--, 1.03<--, 1.02<--, 1.05<--, 0.93<--, 0.87<--  PT/INR - ( 10 Inocencio 2020 08:20 )   PT: 31.8 sec;   INR: 2.73 ratio         PTT - ( 09 Jan 2020 08:25 )  PTT:36.3 sec          MICROBIOLOGY:     RADIOLOGY:  [x ] Reviewed and interpreted by me

## 2020-01-10 NOTE — PROGRESS NOTE ADULT - PROBLEM SELECTOR PLAN 1
-c/w levalbuterol and hypertonic saline nebs q4 ATC   -completed 5 days of levaquin and prendisone  -c/w mucinex 600mg bid  -02 sat stable on RA,  -will set up with nebulizer machine at home and outpatient pulm f/u with Dr Barrera in 1 -2 weeks, repeat chest CT in 4-6 weeks

## 2020-01-10 NOTE — PROGRESS NOTE ADULT - PROBLEM SELECTOR PLAN 2
-small b/l pleural effusions but improved  -change Lasix to 40mg PO q12 today per cards  -TTE 2/2019 with EF 50%, moderate to severe MR and moderate to severe TR with moderate pulmonary HTN  -monitor strict I/O, daily weight  -monitor cr/lytes, replete prn

## 2020-01-10 NOTE — PROGRESS NOTE ADULT - ASSESSMENT
89M PMH Afib (coumadin), PPM, mod pHTN, mitral regurgitation, MVR, BPH presents with dyspnea and wheezing found to have bilateral bibasilar GGO on CT and is RVP+ for hMPV.   Clinically improving.     1. Human MPV pneumonia  -Wheezing, SOB- improving with steroids and diuresis  - Would continue with Levaquin 750 IV QD - day 4/5, c/w Steroids Prednisone 40 mg QD x 5 days  - Bronchodilators - Xopenex Q 4 hours followed by hypertonic inhaled saline and Acapella device to aid in airway clearance.   - Supplemental O2, goal sats 92-96%- on my exam, 96-97% on RA at rest. Would ambulate and check O2 sats  - Incentive spirometer, out of bed as tolerated.    2. HFpEF, Afib with RVR  - Cardiac optimization as per cardiology  - c/w diuresis and strict ins/outs, goal net negative daily- plan to switch to PO diuretics today    3. DVT prophylaxis, out of bed to chair  Dispo home- likely tomorrow- would send home with bronchodilators - Xopenex and Pulmicort nebs. Can follow up with me in the office in 1-2 weeks and for repeat CT chest at 6 weeks  Will follow up    Attending Attestation:   I was physically present for the key portions of the evaluation and management (E/M) service provided.  I agree with the above history, physical, and plan which I have reviewed and edited where appropriate.     35 minutes spent on total encounter; more than 50% of the visit was spent counseling and/or coordinating care by the attending physician.     Plan discussed with patient 89M PMH Afib (coumadin), PPM, mod pHTN, mitral regurgitation, MVR, BPH presents with dyspnea and wheezing found to have bilateral bibasilar GGO on CT and is RVP+ for hMPV.   Clinically much improved    1. Human MPV pneumonia  s/p Levaquin and Prednisone course - clinically much improved   - Bronchodilators - Xopenex Q 4 hours followed by hypertonic inhaled saline and Acapella device to aid in airway clearance.   Ambulated on RA and maintained O2 sats  - Incentive spirometer, out of bed as tolerated.    2. HFpEF, Afib with RVR  - Cardiac optimization as per cardiology  - c/w diuresis and strict ins/outs, goal net negative daily- plan to switch to PO diuretics today    3. DVT prophylaxis, out of bed to chair  Dispo home- likely tomorrow- would send home with bronchodilators - Xopenex and Pulmicort nebs. Can follow up with me in the office in 1-2 weeks and for repeat CT chest at 6 weeks    Please call the answering service at [phone] with questions over the weekend.      Attending Attestation:   I was physically present for the key portions of the evaluation and management (E/M) service provided.  I agree with the above history, physical, and plan which I have reviewed and edited where appropriate.     35 minutes spent on total encounter; more than 50% of the visit was spent counseling and/or coordinating care by the attending physician.     Plan discussed with patient

## 2020-01-10 NOTE — PROGRESS NOTE ADULT - ASSESSMENT
89 year old man with a history of CAD s/p PCI to the mid LAD with a BMS in 1994, double vessel CABG and mitral valve repair February 8th, 2013, paroxysmal atrial fibrillation/flutter with TBS s/p [place] dual chamber PPM, on Coumadin for stroke risk reduction, hypertension and hyperlipidemia, mild LV dysfunction.  His most recent echo in our office demonstrated moderate to severe MR and moderate to severe TR with moderate pulmonary hypertension as of 2/2019. His EF was around 50%.  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    - lung exam remains quite abnormal, and though it is difficult to exclude pulm edema, his issues are more likely pulm than cardiac.  He is a bit improved from yesterday.  - he had mild overload on exam, with small shirley effusions on cxr. He does have severe mr/tr and elevated pulmonary pressures. His BNP is elevated and has trended up.  We performed a non contrast CT of the chest and findings consistent with pulmonary edema.  He is 1.5L net neg. Change to Lasix 40 mg po bid today.  - Please continue to maintain strict I/Os, monitor daily weights, Cr, and K.   - Steroids and nebulizers   - Needs continued support with supplemental oxygen and Chest PT  - replete K to >4, Mg>2    - af rates are ok, Continue metoprolol 100 bid and digoxin. His faster heart rates seem to be reactive.  - continue coumadin for goal inr 2-3.  - continue with losartan  - no sign of acute ischemia. His hs troponins are negative.   - watch creatinine and electrolytes. Keep K>4, Mg>2  - will follow with you.  - d/c planning per primary team. He will follow up with Dr. Kamara in our office.

## 2020-01-10 NOTE — PROGRESS NOTE ADULT - SUBJECTIVE AND OBJECTIVE BOX
[facility] - Preet Glover, Tere, Andreea, Anh, Juan Carlos Garza  Office Number:  [phone]    Patient resting comfortably in bed in NAD.  Laying flat with no respiratory distress.  No complaints of chest pain, dyspnea, palpitations, PND, or orthopnea.    ROS: negative unless otherwise mentioned.    Telemetry:  af 70-80's pvcs    MEDICATIONS  (STANDING):  atorvastatin 10 milliGRAM(s) Oral at bedtime  dextrose 5%. 1000 milliLiter(s) (50 mL/Hr) IV Continuous <Continuous>  dextrose 50% Injectable 12.5 Gram(s) IV Push once  dextrose 50% Injectable 25 Gram(s) IV Push once  dextrose 50% Injectable 25 Gram(s) IV Push once  digoxin     Tablet 0.125 milliGRAM(s) Oral every other day  furosemide   Injectable 40 milliGRAM(s) IV Push every 12 hours  guaiFENesin  milliGRAM(s) Oral every 12 hours  insulin lispro (HumaLOG) corrective regimen sliding scale   SubCutaneous three times a day before meals  levalbuterol Inhalation 0.63 milliGRAM(s) Inhalation every 4 hours  losartan 100 milliGRAM(s) Oral daily  metoprolol tartrate 100 milliGRAM(s) Oral two times a day  sodium chloride 3%  Inhalation 4 milliLiter(s) Inhalation every 4 hours  tamsulosin 0.8 milliGRAM(s) Oral at bedtime    MEDICATIONS  (PRN):  acetaminophen   Tablet .. 650 milliGRAM(s) Oral every 4 hours PRN Mild Pain (1 - 3)  benzonatate 100 milliGRAM(s) Oral three times a day PRN Cough  dextrose 40% Gel 15 Gram(s) Oral once PRN Blood Glucose LESS THAN 70 milliGRAM(s)/deciliter  glucagon  Injectable 1 milliGRAM(s) IntraMuscular once PRN Glucose LESS THAN 70 milligrams/deciliter      Allergies    No Known Allergies    Intolerances        Vital Signs Last 24 Hrs  T(C): 36.4 (10 Inocencio 2020 05:19), Max: 37.4 (09 Jan 2020 21:12)  T(F): 97.5 (10 Inocencio 2020 05:19), Max: 99.4 (09 Jan 2020 21:12)  HR: 78 (10 Inocencio 2020 05:19) (78 - 92)  BP: 133/87 (10 Inocencio 2020 05:19) (101/64 - 133/87)  BP(mean): --  RR: 16 (10 Inocencio 2020 05:19) (16 - 17)  SpO2: 97% (10 Inocencio 2020 05:19) (95% - 98%)    I&O's Summary    09 Jan 2020 07:01  -  10 Inocencio 2020 07:00  --------------------------------------------------------  IN: 780 mL / OUT: 2325 mL / NET: -1545 mL    10 Inocencio 2020 07:01  -  10 Inocencio 2020 10:43  --------------------------------------------------------  IN: 450 mL / OUT: 0 mL / NET: 450 mL        ON EXAM:    Constitutional: well-nourished, well-developed, NAD   HEENT:  MMM, sclerae anicteric, conjunctivae clear, no oral cyanosis.  Pulmonary: Non-labored, diffuse rhonchi with coarse shirley bs  Cardiovascular: Regular, S1 and S2.  No murmur.  No rubs, gallops or clicks  Gastrointestinal: Bowel Sounds present, soft, nontender.   Lymph: No peripheral edema.   Neurological: Alert, no focal deficits  Skin: No rashes.  Psych:  Mood & affect appropriate    LABS: All Labs Reviewed:                        13.4   11.96 )-----------( 214      ( 09 Jan 2020 09:40 )             42.7                         13.7   12.99 )-----------( 221      ( 08 Jan 2020 09:12 )             42.6     09 Jan 2020 06:34    135    |  94     |  30     ----------------------------<  150    4.4     |  28     |  1.16   08 Jan 2020 06:07    138    |  97     |  24     ----------------------------<  150    4.8     |  29     |  1.03     Ca    9.4        09 Jan 2020 06:34  Ca    9.6        08 Jan 2020 06:07  Mg     2.1       08 Jan 2020 06:07      PT/INR - ( 10 Inocencio 2020 08:20 )   PT: 31.8 sec;   INR: 2.73 ratio         PTT - ( 09 Jan 2020 08:25 )  PTT:36.3 sec      Blood Culture:

## 2020-01-10 NOTE — PROGRESS NOTE ADULT - SUBJECTIVE AND OBJECTIVE BOX
Randy Jackson MD  Pager [phone]  Spectra [phone]  Cell Phone [phone]    Patient is a 89y old  Male who presents with a chief complaint of Wheezing and SOB x 4 days (10 Inocencio 2020 10:43)        SUBJECTIVE / OVERNIGHT EVENTS: Patient doing well. States he has not has a BM in several days. Cough has improved.   TELEMETRY: AF 70-80's, PVCs      MEDICATIONS  (STANDING):  atorvastatin 10 milliGRAM(s) Oral at bedtime  dextrose 5%. 1000 milliLiter(s) (50 mL/Hr) IV Continuous <Continuous>  dextrose 50% Injectable 12.5 Gram(s) IV Push once  dextrose 50% Injectable 25 Gram(s) IV Push once  dextrose 50% Injectable 25 Gram(s) IV Push once  digoxin     Tablet 0.125 milliGRAM(s) Oral every other day  furosemide    Tablet 40 milliGRAM(s) Oral two times a day  guaiFENesin  milliGRAM(s) Oral every 12 hours  insulin lispro (HumaLOG) corrective regimen sliding scale   SubCutaneous three times a day before meals  levalbuterol Inhalation 0.63 milliGRAM(s) Inhalation every 4 hours  losartan 100 milliGRAM(s) Oral daily  metoprolol tartrate 100 milliGRAM(s) Oral two times a day  polyethylene glycol 3350 17 Gram(s) Oral once  senna 2 Tablet(s) Oral at bedtime  sodium chloride 3%  Inhalation 4 milliLiter(s) Inhalation every 4 hours  tamsulosin 0.8 milliGRAM(s) Oral at bedtime    MEDICATIONS  (PRN):  acetaminophen   Tablet .. 650 milliGRAM(s) Oral every 4 hours PRN Mild Pain (1 - 3)  benzonatate 100 milliGRAM(s) Oral three times a day PRN Cough  dextrose 40% Gel 15 Gram(s) Oral once PRN Blood Glucose LESS THAN 70 milliGRAM(s)/deciliter  glucagon  Injectable 1 milliGRAM(s) IntraMuscular once PRN Glucose LESS THAN 70 milligrams/deciliter      Vital Signs Last 24 Hrs  T(C): 36.4 (10 Inocencio 2020 05:19), Max: 37.4 (09 Jan 2020 21:12)  T(F): 97.5 (10 Inocencio 2020 05:19), Max: 99.4 (09 Jan 2020 21:12)  HR: 78 (10 Inocencio 2020 05:19) (78 - 92)  BP: 133/87 (10 Inocencio 2020 05:19) (101/64 - 133/87)  BP(mean): --  RR: 16 (10 Inocencio 2020 05:19) (16 - 17)  SpO2: 97% (10 Inocencio 2020 05:19) (96% - 98%)  CAPILLARY BLOOD GLUCOSE      POCT Blood Glucose.: 137 mg/dL (10 Inocencio 2020 08:49)  POCT Blood Glucose.: 142 mg/dL (09 Jan 2020 21:55)  POCT Blood Glucose.: 114 mg/dL (09 Jan 2020 17:05)  POCT Blood Glucose.: 103 mg/dL (09 Jan 2020 12:48)    I&O's Summary    09 Jan 2020 07:01  -  10 Inocencio 2020 07:00  --------------------------------------------------------  IN: 780 mL / OUT: 2325 mL / NET: -1545 mL    10 Inocencio 2020 07:01  -  10 Inocencio 2020 11:31  --------------------------------------------------------  IN: 450 mL / OUT: 0 mL / NET: 450 mL          PHYSICAL EXAM  GENERAL: NAD, well-developed  HEAD:  Atraumatic, Normocephalic  EYES: EOMI, PERRLA, conjunctiva and sclera clear  CHEST/LUNG: Clear to auscultation bilaterally; No wheeze  HEART: Irregularly irregular; No murmurs  ABDOMEN: Soft, Nontender, Nondistended; Bowel sounds present  EXTREMITIES:  2+ Peripheral Pulses, No clubbing, cyanosis; trace to 1+ pedal edema  PSYCH: AAOx3      LABS:                        13.4   11.96 )-----------( 214      ( 09 Jan 2020 09:40 )             42.7     01-09    135  |  94<L>  |  30<H>  ----------------------------<  150<H>  4.4   |  28  |  1.16    Ca    9.4      09 Jan 2020 06:34      PT/INR - ( 10 Inocencio 2020 08:20 )   PT: 31.8 sec;   INR: 2.73 ratio         PTT - ( 09 Jan 2020 08:25 )  PTT:36.3 sec            RADIOLOGY & ADDITIONAL TESTS:    Imaging Personally Reviewed:  Consultant(s) Notes Reviewed:    Care Discussed with Consultants/Other Providers:

## 2020-01-11 NOTE — PROGRESS NOTE ADULT - PROBLEM SELECTOR PLAN 4
Rate controled a.fib 60s, v-Paced  - c/w lopressor 100mg bid, digoxin 0.125mg every other day  - INR 2.44- coumadin 4 mg tonight

## 2020-01-11 NOTE — PROGRESS NOTE ADULT - PROBLEM SELECTOR PLAN 2
Improved wheezing on levalbuterol and hypertonic saline nebs q4 ATC   -completed 5 days of Levaquin and Prednisone  -c/w mucinex 600mg bid  - will set up with nebulizer machine at home and outpatient pulm f/u with Dr Barrera in 1 -2 weeks, repeat chest CT in 4-6 weeks

## 2020-01-11 NOTE — PROGRESS NOTE ADULT - SUBJECTIVE AND OBJECTIVE BOX
[facility] -- Prete Glover, Andreea Carranza Pannella, Patel, Savella  Office # [phone]      Follow Up:  CHF, dyspnea.     Subjective/Observations: Patient seen and examined. Events noted. Resting comfortably in bed. No complaints of chest pain,   or palpitations reported. No signs of orthopnea or PND. dyspnea improved.       REVIEW OF SYSTEMS: All other review of systems is negative unless indicated above    PAST MEDICAL & SURGICAL HISTORY:  Phimosis  Seizure: age 9, had 1 seizure, s/p fall  GERD (gastroesophageal reflux disease)  Valvular heart disease: S/p mitral valve repair  CAD (coronary artery disease): BMS x 1  in the LAD 1995; S/P CABG x2V 2013  BPH (benign prostatic hyperplasia)  HLD (hyperlipidemia)  HTN (hypertension)  Atrial fibrillation  Cataract  Glaucoma  H/O circumcision: 2018  Artificial pacemaker: [employer] Model #[id number] Serial #[id number]  S/P heart valve repair: mitral valve repair with annuloplasty ring 2013  S/P CABG (coronary artery bypass graft): x2 vessels 2013  S/P small bowel resection: due to perforated intestine  during colonoscopy around 2000  Breast cyst: bilateral around 50 years ago  Stented coronary artery: 1995      MEDICATIONS  (STANDING):  atorvastatin 10 milliGRAM(s) Oral at bedtime  dextrose 5%. 1000 milliLiter(s) (50 mL/Hr) IV Continuous <Continuous>  dextrose 50% Injectable 12.5 Gram(s) IV Push once  dextrose 50% Injectable 25 Gram(s) IV Push once  dextrose 50% Injectable 25 Gram(s) IV Push once  digoxin     Tablet 0.125 milliGRAM(s) Oral every other day  furosemide    Tablet 40 milliGRAM(s) Oral two times a day  guaiFENesin  milliGRAM(s) Oral every 12 hours  insulin lispro (HumaLOG) corrective regimen sliding scale   SubCutaneous three times a day before meals  levalbuterol Inhalation 0.63 milliGRAM(s) Inhalation every 4 hours  losartan 100 milliGRAM(s) Oral daily  metoprolol tartrate 100 milliGRAM(s) Oral two times a day  polyethylene glycol 3350 17 Gram(s) Oral daily  senna 2 Tablet(s) Oral at bedtime  sodium chloride 3%  Inhalation 4 milliLiter(s) Inhalation every 4 hours  tamsulosin 0.8 milliGRAM(s) Oral at bedtime    MEDICATIONS  (PRN):  acetaminophen   Tablet .. 650 milliGRAM(s) Oral every 4 hours PRN Mild Pain (1 - 3)  benzonatate 100 milliGRAM(s) Oral three times a day PRN Cough  dextrose 40% Gel 15 Gram(s) Oral once PRN Blood Glucose LESS THAN 70 milliGRAM(s)/deciliter  glucagon  Injectable 1 milliGRAM(s) IntraMuscular once PRN Glucose LESS THAN 70 milligrams/deciliter      Allergies    No Known Allergies    Intolerances            Vital Signs Last 24 Hrs  T(C): 36.3 (11 Jan 2020 04:58), Max: 36.5 (10 Inocencio 2020 20:35)  T(F): 97.4 (11 Jan 2020 04:58), Max: 97.7 (10 Inocencio 2020 20:35)  HR: 87 (11 Jan 2020 04:58) (70 - 92)  BP: 139/66 (11 Jan 2020 04:58) (82/54 - 139/66)  BP(mean): --  RR: 18 (11 Jan 2020 04:58) (16 - 18)  SpO2: 96% (11 Jan 2020 04:58) (96% - 96%)    I&O's Summary    10 Inocencio 2020 07:01  -  11 Jan 2020 07:00  --------------------------------------------------------  IN: 990 mL / OUT: 1500 mL / NET: -510 mL          PHYSICAL EXAM:  TELE: AF    Constitutional: NAD, awake    HEENT: Moist Mucous Membranes, Anicteric  Pulmonary: Decreased breath sounds b/l. No rales, crackles or wheeze appreciated.   Cardiovascular: Regular, S1 and S2, No murmurs, rubs, gallops or clicks  Gastrointestinal: Bowel Sounds present, soft, nontender.   Lymph: No peripheral edema. No lymphadenopathy.  Skin: No visible rashes or ulcers.  Psych:  Mood & affect appropriate for situation    LABS: All Labs Reviewed:                        13.4   11.96 )-----------( 214      ( 09 Jan 2020 09:40 )             42.7                         13.7   12.99 )-----------( 221      ( 08 Jan 2020 09:12 )             42.6     09 Jan 2020 06:34    135    |  94     |  30     ----------------------------<  150    4.4     |  28     |  1.16     Ca    9.4        09 Jan 2020 06:34      PT/INR - ( 10 Inocencio 2020 08:20 )   PT: 31.8 sec;   INR: 2.73 ratio

## 2020-01-11 NOTE — PROGRESS NOTE ADULT - PROBLEM SELECTOR PLAN 1
Breathing improved. CXR showed small b/l pleural effusions but improved. Reviewed Echo-2/2019 with EF 50%, moderate to severe MR and moderate to severe TR with moderate pulmonary HTN  -cardiology plan noted, Lasix to 40mg PO q12   -monitor strict I/O, daily weight  - d/c planning tomorrow

## 2020-01-11 NOTE — PROGRESS NOTE ADULT - SUBJECTIVE AND OBJECTIVE BOX
Mohsin Khan, MD  Attending Physician, Division Of Hospital Medicine  Pager: [phone], Office: [phone]  Off hour pager: [phone]    Patient is a 89y old  Male who presents with a chief complaint of Wheezing and SOB x 4 days    SUBJECTIVE / OVERNIGHT EVENTS:  Seen, examined the patient around 1 pm  Sitting in chair, feels better with breathing, no chest pain, but some dry cough, remains afebrile  C/O constipation, wants to go home tomorrow  Tele- no acute event overnight- a.fib 60s, V-paced, /67, O2 98% in RA      MEDICATIONS  (STANDING):  atorvastatin 10 milliGRAM(s) Oral at bedtime  dextrose 5%. 1000 milliLiter(s) (50 mL/Hr) IV Continuous <Continuous>  dextrose 50% Injectable 12.5 Gram(s) IV Push once  dextrose 50% Injectable 25 Gram(s) IV Push once  dextrose 50% Injectable 25 Gram(s) IV Push once  digoxin     Tablet 0.125 milliGRAM(s) Oral every other day  furosemide    Tablet 40 milliGRAM(s) Oral two times a day  guaiFENesin  milliGRAM(s) Oral every 12 hours  insulin lispro (HumaLOG) corrective regimen sliding scale   SubCutaneous three times a day before meals  levalbuterol Inhalation 0.63 milliGRAM(s) Inhalation every 4 hours  losartan 100 milliGRAM(s) Oral daily  metoprolol tartrate 100 milliGRAM(s) Oral two times a day  polyethylene glycol 3350 17 Gram(s) Oral daily  senna 2 Tablet(s) Oral at bedtime  sodium chloride 3%  Inhalation 4 milliLiter(s) Inhalation every 4 hours  tamsulosin 0.8 milliGRAM(s) Oral at bedtime    MEDICATIONS  (PRN):  acetaminophen   Tablet .. 650 milliGRAM(s) Oral every 4 hours PRN Mild Pain (1 - 3)  benzonatate 100 milliGRAM(s) Oral three times a day PRN Cough  dextrose 40% Gel 15 Gram(s) Oral once PRN Blood Glucose LESS THAN 70 milliGRAM(s)/deciliter  glucagon  Injectable 1 milliGRAM(s) IntraMuscular once PRN Glucose LESS THAN 70 milligrams/deciliter      Vital Signs Last 24 Hrs  T(C): 36.2 (11 Jan 2020 12:46), Max: 36.5 (10 Inocencio 2020 20:35)  T(F): 97.2 (11 Jan 2020 12:46), Max: 97.7 (10 Inocencio 2020 20:35)  HR: 60 (11 Jan 2020 12:46) (60 - 92)  BP: 107/67 (11 Jan 2020 12:46) (88/52 - 139/66)  BP(mean): --  RR: 17 (11 Jan 2020 12:46) (17 - 18)  SpO2: 98% (11 Jan 2020 12:46) (96% - 98%)  CAPILLARY BLOOD GLUCOSE      POCT Blood Glucose.: 202 mg/dL (11 Jan 2020 12:04)  POCT Blood Glucose.: 123 mg/dL (11 Jan 2020 07:48)  POCT Blood Glucose.: 135 mg/dL (10 Inocencio 2020 21:47)  POCT Blood Glucose.: 196 mg/dL (10 Inocencio 2020 16:46)    I&O's Summary    10 Inocencio 2020 07:01  -  11 Jan 2020 07:00  --------------------------------------------------------  IN: 990 mL / OUT: 1500 mL / NET: -510 mL    11 Jan 2020 07:01  -  11 Jan 2020 13:12  --------------------------------------------------------  IN: 300 mL / OUT: 300 mL / NET: 0 mL        PHYSICAL EXAM:-  GENERAL: NAD, well-developed  EYES: EOMI, PERRLA, conjunctiva and sclera clear  NECK: Supple, No JVD, no thyromegaly  CHEST/LUNG: B/L basal rhonchi to auscultation; No wheeze  HEART: Regular rate and rhythm; S1, S2 audible, No murmurs, rubs, or gallops  ABDOMEN: Soft, Nontender, Nondistended; Bowel sounds present  EXTREMITIES:  2+ Peripheral Pulses, No clubbing, cyanosis, or edema  NEURO: AAOx3, no focal deficit      LABS:          PT/INR - ( 11 Jan 2020 09:58 )   PT: 28.6 sec;   INR: 2.44 ratio         RADIOLOGY & ADDITIONAL TESTS:    Imaging Personally Reviewed: CXR, Echo  Consultant(s) Notes Reviewed: Card, Pulmonary  Care Discussed with Consultants/Other Providers: Card, Pulmonary

## 2020-01-11 NOTE — PROGRESS NOTE ADULT - ASSESSMENT
89 year old man with a history of CAD s/p PCI to the mid LAD with a BMS in 1994, double vessel CABG and mitral valve repair February 8th, 2013, paroxysmal atrial fibrillation/flutter with TBS s/p [employer] [place] dual chamber PPM, on Coumadin for stroke risk reduction, hypertension and hyperlipidemia, mild LV dysfunction.  His most recent echo in our office demonstrated moderate to severe MR and moderate to severe TR with moderate pulmonary hypertension as of 2/2019. His EF was around 50%.  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    - lung exam remains  stable.   - he had mild overload on exam, with small shirley effusions on cxr. He does have severe mr/tr and elevated pulmonary pressures. His BNP is elevated and has trended up.  We performed a non contrast CT of the chest and findings consistent with pulmonary edema.  Overall appears to be more compensated. Cont lasix 40mg PO Q12.   - Please continue to maintain strict I/Os, monitor daily weights, Cr, and K.   - Steroids and nebulizers   - Needs continued support with supplemental oxygen and Chest PT  - replete K to >4, Mg>2    - af rates are ok, Continue metoprolol 100 bid and digoxin. His faster heart rates seem to be reactive.  - continue coumadin for goal inr 2-3.  - continue with losartan  - no sign of acute ischemia. His hs troponins are negative.   - watch creatinine and electrolytes. Keep K>4, Mg>2  - will follow with you.  - d/c planning per primary team. He will follow up with Dr. Kamara in our office.

## 2020-01-12 NOTE — PROGRESS NOTE ADULT - PROBLEM SELECTOR PLAN 2
Improved wheezing on levalbuterol and hypertonic saline nebs q4 ATC   -completed 5 days of Levaquin and Prednisone    - will set up with nebulizer machine at home and outpatient pulm f/u with Dr Barrera in 1 -2 weeks, repeat chest CT in 4-6 weeks

## 2020-01-12 NOTE — PROGRESS NOTE ADULT - ATTENDING COMMENTS
Patient seen michaela fritz. Labs and imaging reviewed  +hMPV on RVP. Received Levaquin, prednisone and bronchodilators overnight.  Clinically improving - still with cough, unable to expectorate, however less dyspneic and less toxic appearing.  c/w currently management.
plan of care discussed with medicine NP
plan of care discussed with medicine NP
d/c time- 37 min

## 2020-01-12 NOTE — PROGRESS NOTE ADULT - REASON FOR ADMISSION
Wheezing and SOB x 4 days

## 2020-01-12 NOTE — PROGRESS NOTE ADULT - SUBJECTIVE AND OBJECTIVE BOX
[facility] -- Preet Glover, Andreea Carranza Pannella, Patel, Savella  Office # [phone]      Follow Up:  CHF    Subjective/Observations: Patient seen and examined. Events noted. Resting comfortably in bed. No complaints of chest pain, dyspnea, or palpitations reported. No signs of orthopnea or PND.       REVIEW OF SYSTEMS: All other review of systems is negative unless indicated above    PAST MEDICAL & SURGICAL HISTORY:  Phimosis  Seizure: age 9, had 1 seizure, s/p fall  GERD (gastroesophageal reflux disease)  Valvular heart disease: S/p mitral valve repair  CAD (coronary artery disease): BMS x 1  in the LAD 1995; S/P CABG x2V 2013  BPH (benign prostatic hyperplasia)  HLD (hyperlipidemia)  HTN (hypertension)  Atrial fibrillation  Cataract  Glaucoma  H/O circumcision: 2018  Artificial pacemaker: [employer] Model #[id number] Serial #[id number]  S/P heart valve repair: mitral valve repair with annuloplasty ring 2013  S/P CABG (coronary artery bypass graft): x2 vessels 2013  S/P small bowel resection: due to perforated intestine  during colonoscopy around 2000  Breast cyst: bilateral around 50 years ago  Stented coronary artery: 1995      MEDICATIONS  (STANDING):  atorvastatin 10 milliGRAM(s) Oral at bedtime  bisacodyl 5 milliGRAM(s) Oral at bedtime  dextrose 5%. 1000 milliLiter(s) (50 mL/Hr) IV Continuous <Continuous>  dextrose 50% Injectable 12.5 Gram(s) IV Push once  dextrose 50% Injectable 25 Gram(s) IV Push once  dextrose 50% Injectable 25 Gram(s) IV Push once  digoxin     Tablet 0.125 milliGRAM(s) Oral every other day  furosemide    Tablet 40 milliGRAM(s) Oral two times a day  guaiFENesin  milliGRAM(s) Oral every 12 hours  insulin lispro (HumaLOG) corrective regimen sliding scale   SubCutaneous three times a day before meals  levalbuterol Inhalation 0.63 milliGRAM(s) Inhalation every 4 hours  losartan 100 milliGRAM(s) Oral daily  metoprolol tartrate 100 milliGRAM(s) Oral two times a day  polyethylene glycol 3350 17 Gram(s) Oral daily  senna 2 Tablet(s) Oral at bedtime  sodium chloride 3%  Inhalation 4 milliLiter(s) Inhalation every 4 hours  tamsulosin 0.8 milliGRAM(s) Oral at bedtime    MEDICATIONS  (PRN):  acetaminophen   Tablet .. 650 milliGRAM(s) Oral every 4 hours PRN Mild Pain (1 - 3)  benzonatate 100 milliGRAM(s) Oral three times a day PRN Cough  dextrose 40% Gel 15 Gram(s) Oral once PRN Blood Glucose LESS THAN 70 milliGRAM(s)/deciliter  glucagon  Injectable 1 milliGRAM(s) IntraMuscular once PRN Glucose LESS THAN 70 milligrams/deciliter      Allergies    No Known Allergies    Intolerances            Vital Signs Last 24 Hrs  T(C): 36.3 (12 Jan 2020 04:48), Max: 36.5 (11 Jan 2020 20:34)  T(F): 97.3 (12 Jan 2020 04:48), Max: 97.7 (11 Jan 2020 20:34)  HR: 88 (12 Jan 2020 04:48) (60 - 98)  BP: 145/85 (12 Jan 2020 04:48) (93/62 - 145/85)  BP(mean): --  RR: 17 (12 Jan 2020 04:48) (17 - 18)  SpO2: 94% (12 Jan 2020 04:48) (94% - 98%)    I&O's Summary    11 Jan 2020 07:01  -  12 Jan 2020 07:00  --------------------------------------------------------  IN: 1080 mL / OUT: 1300 mL / NET: -220 mL    12 Jan 2020 07:01  -  12 Jan 2020 09:50  --------------------------------------------------------  IN: 0 mL / OUT: 250 mL / NET: -250 mL          PHYSICAL EXAM:  TELE: AF   Constitutional: NAD, awake   HEENT: Moist Mucous Membranes, Anicteric  Pulmonary: Decreased breath sounds b/l. exp wheeze   Cardiovascular: Regular, S1 and S2, No murmurs, rubs, gallops or clicks  Gastrointestinal: Bowel Sounds present, soft, nontender.   Lymph: No peripheral edema. No lymphadenopathy.  Skin: No visible rashes or ulcers.  Psych:  Mood & affect appropriate for situation    LABS: All Labs Reviewed:    12 Jan 2020 06:42    137    |  99     |  32     ----------------------------<  135    4.7     |  28     |  1.10     Ca    9.1        12 Jan 2020 06:42      PT/INR - ( 11 Jan 2020 09:58 )   PT: 28.6 sec;   INR: 2.44 ratio

## 2020-01-12 NOTE — PROGRESS NOTE ADULT - PROBLEM SELECTOR PROBLEM 8
Constipation, unspecified constipation type
Need for prophylactic measure
Prediabetes
Prediabetes
Need for prophylactic measure

## 2020-01-12 NOTE — PROGRESS NOTE ADULT - SUBJECTIVE AND OBJECTIVE BOX
Mohsin Khan, MD  Attending Physician, Division Of Hospital Medicine  Pager: [phone], Office: [phone]  Off hour pager: [phone]    Patient is a 89y old  Male who presents with a chief complaint of Wheezing and SOB x 4 days    SUBJECTIVE / OVERNIGHT EVENTS:  Seen, examined the patient around 9am  Sitting in chair, feels better with breathing, no chest pain, had good BM last night, remains afebrile  wants to go home today  Tele- no acute event overnight- a.fib 60s, V-paced, /85, O2 96% in RA      MEDICATIONS  (STANDING):  atorvastatin 10 milliGRAM(s) Oral at bedtime  dextrose 5%. 1000 milliLiter(s) (50 mL/Hr) IV Continuous <Continuous>  dextrose 50% Injectable 12.5 Gram(s) IV Push once  dextrose 50% Injectable 25 Gram(s) IV Push once  dextrose 50% Injectable 25 Gram(s) IV Push once  digoxin     Tablet 0.125 milliGRAM(s) Oral every other day  furosemide    Tablet 40 milliGRAM(s) Oral two times a day  guaiFENesin  milliGRAM(s) Oral every 12 hours  insulin lispro (HumaLOG) corrective regimen sliding scale   SubCutaneous three times a day before meals  levalbuterol Inhalation 0.63 milliGRAM(s) Inhalation every 4 hours  losartan 100 milliGRAM(s) Oral daily  metoprolol tartrate 100 milliGRAM(s) Oral two times a day  polyethylene glycol 3350 17 Gram(s) Oral daily  senna 2 Tablet(s) Oral at bedtime  sodium chloride 3%  Inhalation 4 milliLiter(s) Inhalation every 4 hours  tamsulosin 0.8 milliGRAM(s) Oral at bedtime    MEDICATIONS  (PRN):  acetaminophen   Tablet .. 650 milliGRAM(s) Oral every 4 hours PRN Mild Pain (1 - 3)  benzonatate 100 milliGRAM(s) Oral three times a day PRN Cough  dextrose 40% Gel 15 Gram(s) Oral once PRN Blood Glucose LESS THAN 70 milliGRAM(s)/deciliter  glucagon  Injectable 1 milliGRAM(s) IntraMuscular once PRN Glucose LESS THAN 70 milligrams/deciliter      Vital Signs Last 24 Hrs  T(C): 36.2 (11 Jan 2020 12:46), Max: 36.5 (10 Inocencio 2020 20:35)  T(F): 97.2 (11 Jan 2020 12:46), Max: 97.7 (10 Inocencio 2020 20:35)  HR: 60 (11 Jan 2020 12:46) (60 - 92)  BP: 107/67 (11 Jan 2020 12:46) (88/52 - 139/66)  BP(mean): --  RR: 17 (11 Jan 2020 12:46) (17 - 18)  SpO2: 98% (11 Jan 2020 12:46) (96% - 98%)  CAPILLARY BLOOD GLUCOSE      POCT Blood Glucose.: 202 mg/dL (11 Jan 2020 12:04)  POCT Blood Glucose.: 123 mg/dL (11 Jan 2020 07:48)  POCT Blood Glucose.: 135 mg/dL (10 Inocencio 2020 21:47)  POCT Blood Glucose.: 196 mg/dL (10 Inocencio 2020 16:46)    I&O's Summary    10 Inocencio 2020 07:01  -  11 Jan 2020 07:00  --------------------------------------------------------  IN: 990 mL / OUT: 1500 mL / NET: -510 mL    11 Jan 2020 07:01  -  11 Jan 2020 13:12  --------------------------------------------------------  IN: 300 mL / OUT: 300 mL / NET: 0 mL        PHYSICAL EXAM:-  GENERAL: NAD, well-developed  EYES: EOMI, PERRLA, conjunctiva and sclera clear  NECK: Supple, No JVD, no thyromegaly  CHEST/LUNG: B/L basal rhonchi to auscultation; No wheeze  HEART: Regular rate and rhythm; S1, S2 audible, No murmurs, rubs, or gallops  ABDOMEN: Soft, Nontender, Nondistended; Bowel sounds present  EXTREMITIES:  2+ Peripheral Pulses, No clubbing, cyanosis, or edema  NEURO: AAOx3, no focal deficit      LABS:    PT/INR - ( 11 Jan 2020 09:58 )   PT: 28.6 sec;   INR: 2.44 ratio         RADIOLOGY & ADDITIONAL TESTS:    Imaging Personally Reviewed: CXR, Echo  Consultant(s) Notes Reviewed: Card, Pulmonary  Care Discussed with Consultants/Other Providers: Card, Pulmonary

## 2020-01-12 NOTE — PROGRESS NOTE ADULT - PROBLEM SELECTOR PLAN 8
HbA1C 6.3% on 11/2019, FS stable likely more elevated than baseline due to steroid use  -c/w SWATI  -monitor FS
HbA1C 6.3% on 11/2019, FS stable likely more elevated than baseline due to steroid use  -c/w SWATI  -monitor FS
Patient reports no BM for several days, daughter reports patient takes stool softner at home. Start Miralax and Senna today
dvt ppx: coumadin
dvt ppx: coumadin  PT eval, possible discharge to[facility]
dvt ppx: coumadin  PT rec home PT  Dispo: likely discharge home to[facility] if can switch to po diuretics
dvt ppx: coumadin  PT rec home PT  Dispo: likely discharge home to[facility] if can switch to po diuretics

## 2020-01-12 NOTE — PROGRESS NOTE ADULT - PROBLEM SELECTOR PLAN 1
Patient feels better overall. Breathing improved. CXR showed small b/l pleural effusions but improved. Reviewed Echo-2/2019 with EF 50%, moderate to severe MR and moderate to severe TR with moderate pulmonary HTN  -cardiology plan noted, Lasix to 40mg PO q12, Losartan 100mg/d, Metoprolol 100mg bid  - d/c home today, outpatient f/u with Dr Gates (Card) in a week

## 2020-01-12 NOTE — PROGRESS NOTE ADULT - PROBLEM SELECTOR PLAN 9
Transitions of Care Status:  1.  Name of PCP: Dr Gates  2.  PCP Contacted on Admission: [ x] Y    [ ] N    3.  PCP contacted at Discharge: [x ] Y    [ ] N    [ ] N/A  4.  Post-Discharge Appointment Date and Location: in a week  5.  Summary of Handoff given to PCP: Dr Gates

## 2020-01-12 NOTE — PROGRESS NOTE ADULT - PROBLEM SELECTOR PLAN 4
Rate controlled a.fib 60s, v-Paced  - c/w lopressor 100mg bid, digoxin 0.125mg every other day  - dose coumadin to INR 2-3

## 2020-01-15 PROBLEM — Z78.9 SOCIAL ALCOHOL USE: Status: ACTIVE | Noted: 2020-01-01

## 2020-01-15 PROBLEM — J12.3 HUMAN METAPNEUMOVIRUS (HMPV) PNEUMONIA: Status: ACTIVE | Noted: 2020-01-01

## 2020-01-15 NOTE — ASSESSMENT
[FreeTextEntry1] : Mr. Johnson is a 89-year-old male with a history of HTN, HLD, CAD s/p PCI (BMS) to the mLAD ('94), s/p 2V-CABG and mitral valve repair (2013), paroxysmal atrial fibrillation on warfarin, tachy-pedro syndrome s/p PPM (2005), chronic systolic heart failure, moderate pulmonary hypertension due to left sided heart disease, BPH, and bladder tumor s/p transurethral resection of bladder tumor who presents for follow-up after recent hospitalization for human metapneumovirus pneumonia. He was hospitalized from Dec 30-Jan 12th. He received levofloxacin, prednisone, levalbuterol, hypertonic saline, Acapella/incentive spirometry, and diuresis. He was found to be in rapid a-fib and metoprolol was increased to 100 mg twice daily. He was discharged home for outpatient pulmonary follow-up and repeat CT chest in 4-6 weeks. \par \par 1. Human Metapneumovirus pneumonia:\par - CT Chest on 1/3 showed nodular and groundglass opacities in the bilateral lower lobes, right greater than left, likely due to human metapneumovirus infection vs. superimposed bacterial pneumonia\par - He completed course of antibiotics and steroids\par - Resolved dyspnea and wheezing and not currently hypoxemic\par - No exercise limitation and no dyspnea with exertion\par - Repeat CT chest in 1 month to assess for resolution of pneumonia\par \par 2. Chronic systolic heart failure with moderate pulmonary hypertension\par - Has known CAD s/p PCI and CABG. Also with A-Fib, recently with RVR during hospitalization but currently adequately rate controlled\par - Continue metoprolol 100 mg bid and digoxin for rate control, HR is 79 today\par - BP control with metoprolol and losartan, normal BP today\par - Diuresis with furosemide 40 mg twice daily, kidney function and lytes normal during recent hospitalization\par - Low salt diet, avoid canned foods/vegetables, avoid frozen meals, limit adding salt to meals\par - Exercise as tolerated, receiving home physical therapy\par - Keep legs elevated and wear compression stockings\par - On warfarin for A/C. No history of falls. Walks independently and currently receiving home PT\par - Pulmonary hypertension is WHO Group II secondary to left heart disease. He has no underlying lung disease and no evidence of CTEPH. Continue diuresis\par - Follow-up Cardiology (Dr. Kamara) in 1 week\par \par 3. HCM:\par - Follow-up after repeat CT chest in 1 month\par - Up to date with pneumococcal vaccination (2015)\par - Influenza vaccination with PMD

## 2020-01-15 NOTE — CONSULT LETTER
[Dear  ___] : Dear  [unfilled], [Consult Closing:] : Thank you very much for allowing me to participate in the care of this patient.  If you have any questions, please do not hesitate to contact me. [Please see my note below.] : Please see my note below. [Consult Letter:] : I had the pleasure of evaluating your patient, [unfilled]. [FreeTextEntry3] : Esteban Ribeiro MD\par Attending Physician in Pulmonary & Critical Care Medicine\par  of Medicine\par [employer] [facility] [Sincerely,] : Sincerely, [FreeTextEntry2] : Dr. Dilcia Izquierdo MD\par [facility]\par [address]\[place] [address]\par Office: [phone]\par Fax: [phone]

## 2020-01-15 NOTE — HISTORY OF PRESENT ILLNESS
[FreeTextEntry1] : Mr. Johnson is a 89-year-old male with a history of HTN, HLD, CAD s/p PCI (BMS) to the mLAD ('94), s/p 2V-CABG and mitral valve repair (2013), paroxysmal atrial fibrillation on warfarin, tachy-pedro syndrome s/p PPM (2005), chronic systolic heart failure, moderate pulmonary hypertension due to left sided heart disease, BPH, and bladder tumor s/p transurethral resection of bladder tumor who presents for evaluation after recent hospitalization for human metapneumovirus pneumonia. He was hospitalized from Dec 30-Jan 12th. He received levofloxacin, prednisone, levalbuterol, hypertonic saline, Acapella/incentive spirometry, and diuresis. He was found to be in rapid a-fib and metoprolol was increased to 100 mg twice daily. He was discharged home for outpatient pulmonary follow-up and repeat CT chest in 4-6 weeks. \par \par Currently, he reports resolved dyspnea and wheezing. No chest pain or tightness. No fevers or chills. He has mild leg swelling and visiting nurse recommended to keep legs elevated while lying down. Exercise tolerance is normal. Was able to go to supermarket without developing any dyspnea. No dyspnea going up a flight of stairs. Currently receiving home physical therapy. He is adherent with cardiac medications. No nausea, vomiting, dizziness, light-headedness. He is scheduled to follow-up with Dr. Gates next week. \par \par CXR on 12/30 showed bilateral pleural effusions, right>left.\par \par CT Chest 1/3 showed nodular and groundglass opacities in the bilateral lower lobes, right greater than left, likely due to pulmonary edema vs. infection. Small bilateral pleural effusions with adjacent compressive atelectasis. No change in the pancreatic ductal dilatation or atrophy of the distal body and tail of the pancreas.

## 2020-01-15 NOTE — PHYSICAL EXAM
[General Appearance - Well Developed] : well developed [Normal Appearance] : normal appearance [Well Groomed] : well groomed [General Appearance - Well Nourished] : well nourished [General Appearance - In No Acute Distress] : no acute distress [Normal Oropharynx] : normal oropharynx [Neck Appearance] : the appearance of the neck was normal [Normal Conjunctiva] : the conjunctiva exhibited no abnormalities [Neck Cervical Mass (___cm)] : no neck mass was observed [Heart Rate And Rhythm] : heart rate and rhythm were normal [Heart Sounds] : normal S1 and S2 [Murmurs] : no murmurs present [Arterial Pulses Normal] : the arterial pulses were normal [Respiration, Rhythm And Depth] : normal respiratory rhythm and effort [Exaggerated Use Of Accessory Muscles For Inspiration] : no accessory muscle use [Auscultation Breath Sounds / Voice Sounds] : lungs were clear to auscultation bilaterally [Abnormal Walk] : normal gait [Abdomen Soft] : soft [Abdomen Tenderness] : non-tender [Cyanosis, Localized] : no localized cyanosis [Nail Clubbing] : no clubbing of the fingernails [] : no rash [Skin Turgor] : normal skin turgor [Skin Color & Pigmentation] : normal skin color and pigmentation [Oriented To Time, Place, And Person] : oriented to person, place, and time [Motor Exam] : the motor exam was normal [Cranial Nerves] : cranial nerves 2-12 were intact [Mood] : the mood was normal [Affect] : the affect was normal [FreeTextEntry1] : 2-3+ leg edema bilaterally extending 2/3 up the legs

## 2020-02-19 PROBLEM — C67.2 MALIGNANT NEOPLASM OF LATERAL WALL OF URINARY BLADDER: Status: ACTIVE | Noted: 2019-01-01

## 2020-02-20 NOTE — ASU PATIENT PROFILE, ADULT - NSCAFFEAMTFREQ_GEN_ALL_CORE_SD
No protocol. [facility] site printed and placed at [facility] bin for doctor to review.    Last office visit: 01/24/2020  Next office visit:  RETURN IN ABOUT 3 MONTHS (AROUND 4/24/2020).     1-2 cups/cans per day

## 2020-03-05 PROBLEM — R93.89 ABNORMAL CHEST CT: Status: ACTIVE | Noted: 2020-01-01

## 2020-03-26 ENCOUNTER — APPOINTMENT (OUTPATIENT)
Dept: UROLOGY | Facility: CLINIC | Age: 85
End: 2020-03-26

## 2020-04-23 NOTE — ED ADULT NURSE NOTE - PMH
Atrial fibrillation    BPH (benign prostatic hyperplasia)    CAD (coronary artery disease)  x1 stent 1996  S/P CABGx2 2005  Cataract    GERD (gastroesophageal reflux disease)    Glaucoma    HLD (hyperlipidemia)    HTN (hypertension)    Phimosis    Seizure  age 9, had 1 seizure, s/p fall  Valvular heart disease  S/p mitral valve repair
Statement Selected

## 2020-04-27 ENCOUNTER — APPOINTMENT (OUTPATIENT)
Dept: ELECTROPHYSIOLOGY | Facility: CLINIC | Age: 85
End: 2020-04-27

## 2020-05-28 NOTE — ED PROVIDER NOTE - PSH
Bridging on the outpatient side can be performed with enoxaparin. Her creatinine ranged from 2.36-2.89 mg/dL while hospitalized 11/2019, which estimates her CrCl at 14-17 mL/min (using AdjBW). We will contact patient to request updated labs to assess current kidney function. If remains similar to last evaluation, will plan to proceed with enoxaparin 1 mg/kg q24h unless you have an alternative recommendation. Thank you.    Artificial pacemaker  [employer] Model #[id number] Serial #[id number]  Breast cyst  bilateral around 50 years ago  S/P CABG (coronary artery bypass graft)  x2 2013  S/P heart valve repair  mitral valve repair with annuloplasty ring 2013  S/P small bowel resection  due to perforated intestine  during colonoscopy around 2000  Stented coronary artery  1996 x1

## 2020-06-05 ENCOUNTER — APPOINTMENT (OUTPATIENT)
Dept: CARDIOLOGY | Facility: CLINIC | Age: 85
End: 2020-06-05

## 2020-07-23 ENCOUNTER — APPOINTMENT (OUTPATIENT)
Dept: ELECTROPHYSIOLOGY | Facility: CLINIC | Age: 85
End: 2020-07-23

## 2020-08-12 NOTE — PROGRESS NOTE ADULT - PROBLEM SELECTOR PLAN 7
Pt called to f/u with home health. Called HH, they received her paperwork and will start seeing her tomorrow. Instructed pt on how to use dakins and dressing changes. Pt says she had to change drsg 3 times since leaving office yesterday. Drsgs were completely saturated. Pt's  says the drainage was milky colored and still had a foul smell. Hardly any blood seen in wound. Pt denies fever and says she started ABX yesterday. Instructed pt on wound cleaning and reminded her that she can shower with open wound. Is there anything else they should do?   a1c 6.3 on 11/2019, FS stable likely more elevated than baseline due to steroid use  -c/w SWATI  -monitor FS

## 2020-09-21 NOTE — DISCHARGE NOTE NURSING/CASE MANAGEMENT/SOCIAL WORK - NSSCCARECORD_GEN_ALL_CORE
Spoke with mother, rescheduled missed appt per mother's request. No other questions or concerns at this time.     ----- Message from Carmita Briones sent at 9/21/2020 11:54 AM CDT -----  Regarding: appt  Contact: jono Klein [phone]  Mom called requesting a call back from Kaylie Aguilar to re schedule patient in for the appt she missed      
[facility]

## 2020-09-22 NOTE — ED ADULT TRIAGE NOTE - SOURCE OF INFORMATION
Detail Level: Detailed
Patient
Detail Level: Generalized
Patient Specific Counseling (Will Not Stick From Patient To Patient): Advised against tanning bed use

## 2020-11-21 NOTE — STROKE CODE NOTE - NIH STROKE SCALE: 6B. MOTOR LEG, RIGHT, QM
(0) No drift; leg holds 30 degree position for full 5 secs
(0) No drift; leg holds 30 degree position for full 5 secs
all other ROS negative except as per HPI

## 2020-11-28 NOTE — ED PROVIDER NOTE - NSDCPRINTRESULTS_ED_ALL_ED
resident team, mother, patient Patient requests all Lab and Radiology Results on their Discharge Instructions

## 2021-02-08 NOTE — ASU PREOP CHECKLIST - NEEDLE GAUGE
Discussed patient's refusing EFM with Dr. Moe. Per MD, she either needs continuous EFM or AROM with FSE placement. Will relay to patient the need to maintain continuous EFM as IOL protocol, and as she is a high-risk patient.   22

## 2021-02-22 NOTE — H&P PST ADULT - ATTENDING PHYSICIAN: I HAVE REVIEWED THE CLINICAL DOCUMENTATION AND AGREE WITH THE ABOVE NOTE
wears glasses/Partially impaired: cannot see medication labels or newsprint, but can see obstacles in path, and the surrounding layout; can count fingers at arm's length
Statement Selected

## 2021-04-15 NOTE — ED ADULT NURSE NOTE - NSFALLRSKINDICATORS_ED_ALL_ED
[FreeTextEntry1] : follow up for labs results\par  [de-identified] : 41 years old male here for follow up to review and discuss labs results; no complains no

## 2021-07-14 NOTE — ED PROVIDER NOTE - ATTENDING CONTRIBUTION TO CARE
no 89M w/ pmh A fib on coumadin, CAD s/p CABG and PPM, HTN, HLD, and DM II heard a beeping in his chest, states last checked ppm lat yr, about 10 yrs ago.  pt with no tend to ppm site, ekg afib rhythm not paced now, to have ppm interrogated, labs nl, cxr nl. vss. asympatomatic.

## 2021-10-20 NOTE — PATIENT PROFILE ADULT - NSPROGENBLOODRESTRICT_GEN_A_NUR
Pt stated in preop that ride was arranged for ride home. Once arriving to post op pt expressed that there was no one actually present to pick them up. RNs discussed concern that they would not be allowed to drive home due to general anethesia today. Pt given chance to contact someone. Pt did not attempt. Emergency contact, contacted, sister Danielle picked patient up.   
none

## 2021-11-16 NOTE — H&P ADULT - NSHPPOAPULMEMBOLUS_GEN_A_CORE
no [FreeTextEntry1] : Depression screening negative\par She likely has postnasal drip and recurrent tonsil stones. She should gargle daily. Will send a throat culture\par pictures of bloody stool reviewed. stool is not solid. will have her go back to GI. May need colonoscopy

## 2022-10-06 NOTE — ED ADULT TRIAGE NOTE - BMI (KG/M2)
[facility]                                                                                          Hospitalist Progress Note  Blanca Grigsby MD  Answering service: [phone] from in house phone        Date of Service:  10/6/2022  NAME:  Lillian Neves  :  1969  MRN:  [id number]      Admission Summary: This is a 49-year-old man with a PMH of T2DM, BKA bilaterally, JEMIMA on CKD requiring HD, Respiratory Failure requiring intubation who presented at the ED from [facility] with c/o abdominal pain. The patient was recently admitted to another hospital and underwent left below-knee amputation, hospitalization complicated with respiratory failure which required prolonged intubation- attributed to aspiration pneumonia, also developed lobulated effusion, for which the patient underwent decortication and right thoracotomy and acute renal failure for which he has been started on hemodialysis. He had a J-tube was placed for supplemental nutrition and was transferred to [facility] for continuation of care. He was doing relatively well in [facility] until the day of presentation at the ED when the patient complained of abdominal pain at the facility. CT scan of the abdomen and pelvis was obtained: shows evidence of bowel perforation, sent to [facility] fro further eval/tx. When the patient arrived at the emergency room, based on his clinical presentation and lab work, Code Sepsis was triggered. The patient received fluid therapy as per sepsis protocol. The emergency room physician consulted general surgeon on-call. The patient was seen by the surgeon and the patient is planned for immediate surgical intervention. No record of prior admission to this hospital.     Interval history / Subjective:   No acute events  NAD  pending placement     Assessment & Plan:     Dislodged JG tube with surrounding peritonitis. Free air and perirectal fistula.   Patient was sent from [facility] for intractable abdominal pain. Severe sepsis without septic shock due to intra-abdominal source of infection. .  -He was started on broad-spectrum empiric antibiotics on admission and underwent the following procedures:  -Gen Sx: 9/10 Lap take down and removal J-tube, Lap colostomy, I&D abdominal wall  -Colorectal Sx: 9/15 anorectal wound exam including rigid proctosigmoidoscopy  -ID were consulted and assisted with antibiotics management  Eraxis/Zosyn/Vancomycin completed 9/23/22, then PO Augmentin x4d and switched back to IV d/t persistent leukocytosis  Cont IV Merrem x2 weeks, last dose 10/13  Haley catheter in place  -C. difficile, blood culture were negative  -CT chest 9/10: Small right basilar gas and fluid containing pleural collection with a  peripheral soft tissue rind. Finding may represent an empyema and clinical correlation is recommended. Recommend direct comparison to any additional prior imaging. Free intraperitoneal gas, seen on prior CT of the abdomen and pelvis. -CT abd/pel wo 9/14:No focal intra-abdominal fluid collection to suggest abscess. Free intraperitoneal gas consistent with recent intra-abdominal surgery. Interval improvement in rectal thickening. Prior CT dated September 9, 2022 demonstrated a probable right posterior rectal wall defect which is no longer visualized on today's examination. Persistent, small right basilar gas and fluid containing pleural collection, unchanged.  -wound care ostomy care    JEMIMA vs CKD requiring dialysis: CVC 9/16/22- IR  -Continued on hemodialysis on MWF schedule  -Access, permacath placed on 9/16  --10/05 Outpatient unit: [facility] MWF , TTS HD Currently, on [facility]     H/o Type 2 diabetes mellitus  S/p TPN  SSI not requiring, will dc     AFIB: rate stable  -eliquis 5mg BID  -ECHO 9/26: Left Ventricle: The EF by visual approximation is 55 - 60%. Left ventricle size is normal. Normal wall thickness. Normal wall motion. Normal diastolic function.  Tricuspid Valve: Mildly elevated RVSP. The estimated RVSP is 42 mmHg     Empyema: noted: recent thoracotomy and prolonged intubation at [facility]. -CXR: Persistent right basilar airspace disease and right-sided loculated effusion/empyema. -CT chest 9/10: Small right basilar gas and fluid containing pleural collection with a  peripheral soft tissue rind. Finding may represent an empyema and clinical correlation is recommended. Recommend direct comparison to any additional prior imaging. Free intraperitoneal gas, seen on prior CT of the abdomen and pelvis. -Pulmonary consulted: noted \"percutaneous drainage would not be successful at removing fluid as I suppose it is very thick and organized at this time. Would only recommend following clinically and radiographically. If worsens or worsened right-sided effusion he would need to be reevaluated by his thoracic surgeon at [facility] for additional drainage. \"  -IV ABT's course as above     Acute blood loss anemia on chronic: stable  --on MICHAEL per Nephr     +Occult Blood: no melena, no n/v  -PPI  -iron supplements  -monitor Hgb, transfuse to keep Hgb > 7.0     AMS: delirium, hallucinations ? 2/2 to toxic metabolic encephalopathy, hospital delirium, and opioid induced  -CT head : no acute intracranial abnormality.   -Alert and oriented since      Depression: continue zoloft.     Thrombocytosis: noted reactive thrombocytosis, monitor platelets    Epigastric J site wound with surrounding abscess, POA  --Patient underwent incision and drainage on 9/10    Bilateral BKA, left BKA dehiscence and possible infection  -Vascular Sx followin/26-sutures removed and some eschar along incision line excised, open cavity laterally has old hematoma at base that was evacuated   -CT of the left BKA stump on  showed several irregular soft tissue defects overlying the amputation site with a 4.4 x 4.1 x 1.9 cm amorphous fluid collection with partial thick walled/rim-enhancing and internal gas locules. 9/29- Leg wound growing Pseudomonas  Vascular has seen  continue antibiotics as above    Anorectal wound, POA  --Followed by colorectal surgery. He is status post proctosigmoidoscopy on 9/15, there was communication of the distal rectal lumen with extraperitoneal pelvis. Acute pain syndrome. This is due to the multiple surgeries he had. Anticipate some degree of tolerance due to his continued exposure to opioids  --Current pain regimen includes scheduled gabapentin 100 mg 3 times daily  -- As needed Dilaudid    Continue wound care    DVT ppx: Eliquis  Code Status: Full Code  Ambulates:  nonambulatory  Discharge planning: [facility] pending [facility] Problems  Date Reviewed: 9/10/2022            Codes Class Noted POA    Injury to rectum without open wound into cavity ICD-10-CM: S36.60XA  ICD-9-CM: 863.45  9/20/2022 Yes        Open wound of anus ICD-10-CM: A95.516O  ICD-9-CM: 863.89  9/20/2022 Yes        Severe protein-calorie malnutrition ([facility] [address] ICD-10-CM: E43  ICD-9-CM: 680  9/13/2022 Yes        * (Principal) Intra-abdominal infection ICD-10-CM: B99.9  ICD-9-CM: 136.9  9/10/2022 Yes       Review of Systems:   A comprehensive review of systems was negative except for that written in the HPI. Vital Signs:    Last 24hrs VS reviewed since prior progress note.  Most recent are:  Visit Vitals  /88   Pulse 65   Temp 98.3 °F (36.8 °C)   Resp 18   Ht 5' 7\" (1.702 m)   Wt 67.8 kg (149 lb 7.6 oz)   SpO2 95%   BMI 23.41 kg/m²         Intake/Output Summary (Last 24 hours) at 10/6/2022 1249  Last data filed at 10/6/2022 1110  Gross per 24 hour   Intake --   Output 2075 ml   Net -2075 ml          Physical Examination:     I had a face to face encounter with this patient and independently examined them on 10/6/2022 as outlined below:    Refer to wound care pictures for abdominal wound          Constitutional: Chronically sick looking gentleman, he is alert, NAD   ENT: Oral mucosa moist   Resp:  CTA bilaterally. No wheezing/rhonchi/rales. No accessory muscle use   CV:  RRR, no audible m/r/g    GI/: Abd wound vac in place, ostomy, nondistended    Musculoskeletal:  No edema, warm, bilateral BKA    Neurologic:  Skin:  Psych:  Moves all extremities. Awake and alert   multiple wounds, skin w/d, jaundiced. Calm, Not anxious or agitated. Data Review:    Review and/or order of clinical lab test      Labs:     Recent Labs     10/06/22  0414 10/04/22  0111   WBC 9.0 10.8   HGB 7.8* 7.7*   HCT 24.8* 24.9*   * 409*       Recent Labs     10/06/22  0414 10/04/22  0111    137   K 4.0 3.6   * 105   CO2 24 21   BUN 91* 99*   CREA 2.58* 2.71*   GLU 98 78   CA 8.9 8.3*   MG 2.2 2.1   PHOS 3.6 2.6       Recent Labs     10/06/22  0414 10/04/22  0111   ALT 28 21   * 160*   TBILI 0.2 0.2   TP 6.3* 5.3*   ALB 1.8* 1.5*   GLOB 4.5* 3.8       No results for input(s): INR, PTP, APTT, INREXT, INREXT in the last 72 hours. No results for input(s): FE, TIBC, PSAT, FERR in the last 72 hours. Lab Results   Component Value Date/Time    Folate 4.1 (L) 09/10/2022 12:00 PM        No results for input(s): PH, PCO2, PO2 in the last 72 hours. No results for input(s): CPK, CKNDX, TROIQ in the last 72 hours.     No lab exists for component: CPKMB  Lab Results   Component Value Date/Time    Triglyceride 96 10/06/2022 04:14 AM     Lab Results   Component Value Date/Time    Glucose (POC) 95 10/06/2022 11:14 AM    Glucose (POC) 107 10/06/2022 12:35 AM    Glucose (POC) 99 10/05/2022 11:27 AM    Glucose (POC) 99 10/05/2022 07:02 AM    Glucose (POC) 86 10/04/2022 11:43 PM     No results found for: COLOR, APPRN, SPGRU, REFSG, WILL, PROTU, GLUCU, KETU, BILU, UROU, MISHA, LEUKU, GLUKE, EPSU, BACTU, WBCU, RBCU, CASTS, UCRY      Medications Reviewed:     Current Facility-Administered Medications   Medication Dose Route Frequency    epoetin vera-epbx (RETACRIT) injection 20,000 Units  20,000 Units SubCUTAneous DIALYSIS TUE, THU & SAT    heparin (porcine) 1,000 unit/mL injection 1,800 Units  1,800 Units InterCATHeter DIALYSIS PRN    And    heparin (porcine) 1,000 unit/mL injection 1,700 Units  1,700 Units InterCATHeter DIALYSIS PRN    0.9% sodium chloride infusion 250 mL  250 mL IntraVENous PRN    albumin human 25% (BUMINATE) solution 50 g  50 g IntraVENous DIALYSIS PRN    HYDROmorphone (DILAUDID) injection 1 mg  1 mg IntraVENous Q3H PRN    meropenem (MERREM) 500 mg in 0.9% sodium chloride (MBP/ADV) 50 mL MBP  500 mg IntraVENous Q24H    0.9% sodium chloride infusion 250 mL  250 mL IntraVENous PRN    insulin regular (NOVOLIN R, HUMULIN R) injection   SubCUTAneous Q6H    glucose chewable tablet 16 g  4 Tablet Oral PRN    glucagon (GLUCAGEN) injection 1 mg  1 mg IntraMUSCular PRN    dextrose 10 % infusion 0-250 mL  0-250 mL IntraVENous PRN    gabapentin (NEURONTIN) capsule 100 mg  100 mg Oral TID    ferrous sulfate tablet 325 mg  1 Tablet Oral BID WITH MEALS    HYDROmorphone (DILAUDID) tablet 2 mg  2 mg Oral Q4H PRN    HYDROmorphone (DILAUDID) tablet 4 mg  4 mg Oral Q4H PRN    hydrALAZINE (APRESOLINE) 20 mg/mL injection 10 mg  10 mg IntraVENous Q6H PRN    mirtazapine (REMERON) tablet 7.5 mg  7.5 mg Oral QHS    pantoprazole (PROTONIX) tablet 40 mg  40 mg Oral ACB&D    apixaban (ELIQUIS) tablet 5 mg  5 mg Oral BID    sertraline (ZOLOFT) tablet 25 mg  25 mg Oral DAILY    collagenase (SANTYL) 250 unit/gram ointment   Topical DAILY    diphenhydrAMINE (BENADRYL) injection 12.5 mg  12.5 mg IntraVENous Q6H PRN    sodium chloride (NS) flush 5-40 mL  5-40 mL IntraVENous Q8H    sodium chloride (NS) flush 5-40 mL  5-40 mL IntraVENous PRN    acetaminophen (TYLENOL) tablet 650 mg  650 mg Oral Q6H PRN    polyethylene glycol (MIRALAX) packet 17 g  17 g Oral DAILY PRN    ondansetron (ZOFRAN ODT) tablet 4 mg  4 mg Oral Q8H PRN    Or    ondansetron (ZOFRAN) injection 4 mg  4 mg IntraVENous Q6H PRN L.acidophilus-paracasei-S.thermophil-bifidobacter (RISAQUAD) 8 billion cell capsule  1 Capsule Oral DAILY     ______________________________________________________________________  EXPECTED LENGTH OF STAY: 9d 14h  ACTUAL LENGTH OF STAY:          26                 Padma Vincent MD 24.6

## 2023-02-06 NOTE — PROGRESS NOTE ADULT - PROBLEM SELECTOR PLAN 2
Condition:: Patient here for closure Please Describe Your Condition:: Right superior central forehead small b/l pleural effusions, LE edema improving   -TTE 2/2019 with EF 50%, moderate to severe MR and moderate to severe TR with moderate pulmonary HTN  -c/w IV lasix 20mg q12 per cards  -monitor strict I/O, daily weight

## 2023-02-13 NOTE — DISCHARGE NOTE NURSING/CASE MANAGEMENT/SOCIAL WORK - NSDCVIVACCINE_GEN_ALL_CORE_FT
"Ramos Asenciovor" Antonia was seen and treated in our emergency department on 2/13/2023.  He may return to work on 02/14/2023.       If you have any questions or concerns, please don't hesitate to call.      Ash Parra MD"
No Vaccines Administered.

## 2023-04-12 NOTE — ED ADULT NURSE NOTE - NS ED NURSE LEVEL OF CONSCIOUSNESS ORIENTATION
Addended by: JASON MAN on: 4/12/2023 12:30 PM     Modules accepted: Orders     Oriented - self; Oriented - place; Oriented - time

## 2023-08-17 NOTE — PROGRESS NOTE ADULT - SUBJECTIVE AND OBJECTIVE BOX
Patient is a 89y old  Male who presents with a chief complaint of Wheezing and SOB x 4 days (07 Jan 2020 13:11)      SUBJECTIVE / OVERNIGHT EVENTS: No overnight events. Denies sob. +cough and wheezing but slowly improving. Denies cp, palpitations, n/v    Tele reviewed: afib 70-110s, pvcs, trigem      ADDITIONAL REVIEW OF SYSTEMS: Negative except for above    MEDICATIONS  (STANDING):  atorvastatin 10 milliGRAM(s) Oral at bedtime  dextrose 5%. 1000 milliLiter(s) (50 mL/Hr) IV Continuous <Continuous>  dextrose 50% Injectable 12.5 Gram(s) IV Push once  dextrose 50% Injectable 25 Gram(s) IV Push once  dextrose 50% Injectable 25 Gram(s) IV Push once  digoxin     Tablet 0.125 milliGRAM(s) Oral every other day  furosemide   Injectable 40 milliGRAM(s) IV Push every 12 hours  guaiFENesin  milliGRAM(s) Oral every 12 hours  insulin lispro (HumaLOG) corrective regimen sliding scale   SubCutaneous three times a day before meals  levalbuterol Inhalation 0.63 milliGRAM(s) Inhalation every 4 hours  levoFLOXacin  Tablet 750 milliGRAM(s) Oral once  losartan 100 milliGRAM(s) Oral daily  metoprolol tartrate 100 milliGRAM(s) Oral two times a day  predniSONE   Tablet 40 milliGRAM(s) Oral daily  sodium chloride 3%  Inhalation 4 milliLiter(s) Inhalation every 4 hours  tamsulosin 0.8 milliGRAM(s) Oral at bedtime  warfarin 4 milliGRAM(s) Oral once    MEDICATIONS  (PRN):  acetaminophen   Tablet .. 650 milliGRAM(s) Oral every 4 hours PRN Mild Pain (1 - 3)  benzonatate 100 milliGRAM(s) Oral three times a day PRN Cough  dextrose 40% Gel 15 Gram(s) Oral once PRN Blood Glucose LESS THAN 70 milliGRAM(s)/deciliter  glucagon  Injectable 1 milliGRAM(s) IntraMuscular once PRN Glucose LESS THAN 70 milligrams/deciliter      CAPILLARY BLOOD GLUCOSE      POCT Blood Glucose.: 279 mg/dL (07 Jan 2020 12:15)  POCT Blood Glucose.: 146 mg/dL (07 Jan 2020 08:07)  POCT Blood Glucose.: 151 mg/dL (06 Jan 2020 21:07)  POCT Blood Glucose.: 180 mg/dL (06 Jan 2020 17:00)    I&O's Summary    06 Jan 2020 07:01  -  07 Jan 2020 07:00  --------------------------------------------------------  IN: 300 mL / OUT: 3800 mL / NET: -3500 mL    07 Jan 2020 07:01  -  07 Jan 2020 14:03  --------------------------------------------------------  IN: 600 mL / OUT: 600 mL / NET: 0 mL        PHYSICAL EXAM:  Vital Signs Last 24 Hrs  T(C): 37.1 (07 Jan 2020 13:34), Max: 37.1 (07 Jan 2020 13:34)  T(F): 98.7 (07 Jan 2020 13:34), Max: 98.7 (07 Jan 2020 13:34)  HR: 89 (07 Jan 2020 13:34) (89 - 99)  BP: 125/72 (07 Jan 2020 13:34) (121/75 - 150/73)  BP(mean): --  RR: 18 (07 Jan 2020 13:34) (18 - 18)  SpO2: 99% (07 Jan 2020 13:34) (99% - 99%)    PHYSICAL EXAM:  GENERAL: NAD, well-developed in chair on 2L NC, not tachypneic   HEAD:  Atraumatic, Normocephalic  NECK: Supple, No JVD  CHEST/LUNG: mild diffuse wheezing and rhonci b/l improving from yesterday  HEART: IRRegular rhythm; +systolic murmur  ABDOMEN: Soft, Nontender, Nondistended; Bowel sounds present  EXTREMITIES:  2+ Peripheral Pulses, trace pitting ankle edema b/l improving  PSYCH: AAOx3  NEUROLOGY: non-focal    LABS:                        11.9   11.73 )-----------( 178      ( 07 Jan 2020 09:28 )             36.6     01-07    138  |  95<L>  |  25<H>  ----------------------------<  169<H>  3.4<L>   |  27  |  1.02    Ca    9.0      07 Jan 2020 06:38  Mg     2.0     01-07    TPro  6.1  /  Alb  3.3  /  TBili  0.7  /  DBili  x   /  AST  22  /  ALT  28  /  AlkPhos  132<H>  01-07    PT/INR - ( 07 Jan 2020 08:53 )   PT: 24.0 sec;   INR: 2.08 ratio         PTT - ( 07 Jan 2020 08:53 )  PTT:33.2 sec          Culture - Blood (collected 04 Jan 2020 22:45)  Source: .Blood Blood-Peripheral  Preliminary Report (05 Jan 2020 23:01):    No growth to date.    Culture - Blood (collected 04 Jan 2020 22:45)  Source: .Blood Blood-Peripheral  Preliminary Report (05 Jan 2020 23:01):    No growth to date.        RADIOLOGY & ADDITIONAL TESTS:    Imaging Personally Reviewed:    Electrocardiogram Personally Reviewed:    COORDINATION OF CARE:  Care Discussed with Consultants/Other Providers [Y/N]:  Prior or Outpatient Records Reviewed [Y/N]: Klisyri Counseling:  I discussed with the patient the risks of Klisyri including but not limited to erythema, scaling, itching, weeping, crusting, and pain.

## 2023-09-22 NOTE — H&P PST ADULT - NSANTHOSAYNRD_GEN_A_CORE
used No. SELWYN screening performed.  STOP BANG Legend: 0-2 = LOW Risk; 3-4 = INTERMEDIATE Risk; 5-8 = HIGH Risk

## 2023-11-09 NOTE — DIETITIAN INITIAL EVALUATION ADULT. - PROBLEM SELECTOR PLAN 1
162.56
suspect viral URI , CXR w/o infiltrate, no ischemic changes on ekg , symptoms improved with nebulizers, no evidence of pulmonary edema , low suspicion for PE as patient on warfarin , however was recently held  for surgical procedure, if symptoms do not improve can consider working up as an alternative diagnosis   - Levalbuterol q6hr   - monitor on telemetry   - f/u Troponin

## 2023-12-20 NOTE — ED PROVIDER NOTE - CROS ED RESP ALL NEG
negative... What Type Of Note Output Would You Prefer (Optional)?: Bullet Format How Severe Is Your Skin Lesion?: mild Has Your Skin Lesion Been Treated?: not been treated Is This A New Presentation, Or A Follow-Up?: Skin Lesion Which Family Member (Optional)?: Mother

## 2024-11-14 NOTE — ED ADULT TRIAGE NOTE - NS ED TRIAGE AVPU SCALE
Alert-The patient is alert, awake and responds to voice. The patient is oriented to time, place, and person. The triage nurse is able to obtain subjective information.
daily

## 2025-01-07 NOTE — PROGRESS NOTE ADULT - PROBLEM SELECTOR PLAN 7
high grade urothelial carcinoma on pathology   - outpatient  f/u John - signed out to me pending CT head/neck results and probably admission for pain control and PT, CT head and neck with no acute intracranial injury or neck fracture.  Pt still in pain and unable to ambulate with walker.  Will admit to medicine for pain control and PT.